# Patient Record
Sex: FEMALE | Race: BLACK OR AFRICAN AMERICAN | Employment: OTHER | URBAN - METROPOLITAN AREA
[De-identification: names, ages, dates, MRNs, and addresses within clinical notes are randomized per-mention and may not be internally consistent; named-entity substitution may affect disease eponyms.]

---

## 2019-04-06 ENCOUNTER — HOSPITAL ENCOUNTER (EMERGENCY)
Age: 64
Discharge: HOME | End: 2019-04-06
Attending: EMERGENCY MEDICINE | Admitting: EMERGENCY MEDICINE
Payer: COMMERCIAL

## 2019-04-06 ENCOUNTER — APPOINTMENT (OUTPATIENT)
Dept: GENERAL RADIOLOGY | Age: 64
End: 2019-04-06
Attending: PHYSICIAN ASSISTANT
Payer: COMMERCIAL

## 2019-04-06 VITALS
TEMPERATURE: 98.3 F | HEART RATE: 85 BPM | BODY MASS INDEX: 22.82 KG/M2 | HEIGHT: 62 IN | DIASTOLIC BLOOD PRESSURE: 96 MMHG | RESPIRATION RATE: 16 BRPM | OXYGEN SATURATION: 99 % | SYSTOLIC BLOOD PRESSURE: 177 MMHG | WEIGHT: 124 LBS

## 2019-04-06 DIAGNOSIS — M25.511 ACUTE PAIN OF RIGHT SHOULDER: Primary | ICD-10-CM

## 2019-04-06 PROCEDURE — A4565 SLINGS: HCPCS

## 2019-04-06 PROCEDURE — 73030 X-RAY EXAM OF SHOULDER: CPT

## 2019-04-06 PROCEDURE — 74011250637 HC RX REV CODE- 250/637: Performed by: PHYSICIAN ASSISTANT

## 2019-04-06 PROCEDURE — 99283 EMERGENCY DEPT VISIT LOW MDM: CPT

## 2019-04-06 RX ORDER — DIVALPROEX SODIUM 250 MG/1
250 TABLET, DELAYED RELEASE ORAL
COMMUNITY

## 2019-04-06 RX ORDER — NAPROXEN 250 MG/1
500 TABLET ORAL
Status: COMPLETED | OUTPATIENT
Start: 2019-04-06 | End: 2019-04-06

## 2019-04-06 RX ORDER — AMLODIPINE BESYLATE 5 MG/1
5 TABLET ORAL DAILY
COMMUNITY

## 2019-04-06 RX ORDER — LANOLIN ALCOHOL/MO/W.PET/CERES
325 CREAM (GRAM) TOPICAL
COMMUNITY

## 2019-04-06 RX ORDER — BISMUTH SUBSALICYLATE 262 MG
1 TABLET,CHEWABLE ORAL DAILY
COMMUNITY

## 2019-04-06 RX ORDER — FOLIC ACID 1 MG/1
1 TABLET ORAL DAILY
COMMUNITY

## 2019-04-06 RX ORDER — NAPROXEN 500 MG/1
500 TABLET ORAL 2 TIMES DAILY WITH MEALS
Qty: 20 TAB | Refills: 0 | Status: SHIPPED | OUTPATIENT
Start: 2019-04-06 | End: 2019-04-16

## 2019-04-06 RX ORDER — PANTOPRAZOLE SODIUM 40 MG/1
40 TABLET, DELAYED RELEASE ORAL DAILY
COMMUNITY

## 2019-04-06 RX ORDER — DIAZEPAM 2 MG/1
2 TABLET ORAL
COMMUNITY

## 2019-04-06 RX ADMIN — NAPROXEN 500 MG: 250 TABLET ORAL at 20:59

## 2019-04-07 NOTE — ED PROVIDER NOTES
58 y.o. female with past medical history significant for Anxiety, HTN, and CVA who presents ambulatory to the ED, accompanied by son and daughter, with chief complaint of 9/10 aching, R shoulder pain, onset earlier this evening, after sleeping on several pillows. No modifying factors noted. She denies fever, cough, rhinorrhea, CP and SOB. Pt notes that pain has improved since onset. She has not taken any medication for her sx tonight. There are no other acute medical concerns at this time. Full history, physical exam, and ROS unable to be obtained due to:  Confusion. Pt is a poor historian    Positive Tobacco use; Positive EtOH use; Negative Illicit Drug Abuse       PCP: Marielle, MD Stevie    Note written by Lance Hakcett, as dictated by Fatimah scott RUVALCABA 8:34 PM      The history is provided by the patient, a relative and medical records. No  was used. Past Medical History:   Diagnosis Date    Anxiety     Bipolar 1 disorder ([address]     [facility]     Hypertension        No past surgical history on file. No family history on file. Social History     Socioeconomic History    Marital status: SINGLE     Spouse name: Not on file    Number of children: Not on file    Years of education: Not on file    Highest education level: Not on file   Occupational History    Not on file   Social Needs    Financial resource strain: Not on file    Food insecurity:     Worry: Not on file     Inability: Not on file    Transportation needs:     Medical: Not on file     Non-medical: Not on file   Tobacco Use    Smoking status: Current Every Day Smoker   Substance and Sexual Activity    Alcohol use:  Yes    Drug use: Not Currently    Sexual activity: Not on file   Lifestyle    Physical activity:     Days per week: Not on file     Minutes per session: Not on file    Stress: Not on file   Relationships    Social connections:     Talks on phone: Not on file     Gets together: Not on file     Attends [employer] service: Not on file     Active member of club or organization: Not on file     Attends meetings of clubs or organizations: Not on file     Relationship status: Not on file    Intimate partner violence:     Fear of current or ex partner: Not on file     Emotionally abused: Not on file     Physically abused: Not on file     Forced sexual activity: Not on file   Other Topics Concern    Not on file   Social History Narrative    Not on file         ALLERGIES: Patient has no known allergies. Review of Systems   Unable to perform ROS: Other   Musculoskeletal: Positive for arthralgias (rt shoulder). Vitals:    04/06/19 2015 04/06/19 2038 04/06/19 2040   BP:  (!) 177/96    Pulse: 86 85    Resp:  16    Temp:  98.3 °F (36.8 °C)    SpO2: 96% 99% 99%   Weight:  56.2 kg (124 lb)    Height:  5' 2\" (1.575 m)             Physical Exam   Constitutional: She appears well-developed and well-nourished. No distress. Pleasant AAF in NAD   HENT:   Head: Normocephalic and atraumatic. Left Ear: External ear normal.   Eyes: Pupils are equal, round, and reactive to light. Conjunctivae are normal.   Neck: Normal range of motion. Neck supple. Cardiovascular: Normal rate, regular rhythm and normal heart sounds. Pulmonary/Chest: Effort normal. No respiratory distress. She has no wheezes. Abdominal: Soft. Bowel sounds are normal. She exhibits no distension. There is no tenderness. There is no rebound. Musculoskeletal: Normal range of motion. Arms:  Neurological: She is alert. Skin: Skin is warm and dry. No ecchymosis, no laceration and no lesion noted. Nursing note and vitals reviewed. MDM  Number of Diagnoses or Management Options  Acute pain of right shoulder:   Diagnosis management comments: 59 yo AAF with complaint of rt shoulder pain. Doubt fracture. ? OA vs contusion vs strain  Plan  Xray rt shoulder  Analgesia.  [facility] Amount and/or Complexity of Data Reviewed  Tests in the radiology section of CPT®: ordered and reviewed  Independent visualization of images, tracings, or specimens: yes           Procedures    Progress note  Imaging reviewed. AC OA noted. Yelena Khan    Patient's results have been reviewed with them. Patient and/or family have verbally conveyed their understanding and agreement of the patient's signs, symptoms, diagnosis, treatment and prognosis and additionally agree to follow up as recommended or return to the Emergency Room should their condition change prior to follow-up. Discharge instructions have also been provided to the patient with some educational information regarding their diagnosis as well a list of reasons why they would want to return to the ER prior to their follow-up appointment should their condition change.  Yelena Melton

## 2019-04-07 NOTE — ED TRIAGE NOTES
Triage note : pt arrives ambulatory via private car with c/o  Right shoulder pain that started today  . Pt is baseline confused due to previous stroke per family -pt is alert to self . Pt has not taken any pain medication today .   Pt reports 9/10 pain

## 2019-04-07 NOTE — ED NOTES
Discharge instructions given to pt by MD and RN . Pt has been given counseling on med use and verbalizes  understanding . Steff Vasquez Pt ambulated off unit with no signs of distress.

## 2019-04-07 NOTE — DISCHARGE INSTRUCTIONS
Patient Education   Apply ice  Use sling for comfort  Naprosyn twice daily for pain  Follow-up with regular doctor  Musculoskeletal Pain: Care Instructions  Your Care Instructions  Different problems with the bones, muscles, nerves, ligaments, and tendons in the body can cause pain. One or more areas of your body may ache or burn. Or they may feel tired, stiff, or sore. The medical term for this type of pain is musculoskeletal pain. It can have many different causes. Sometimes the pain is caused by an injury such as a strain or sprain. Or you might have pain from using one part of your body in the same way over and over again. This is called overuse. In some cases, the cause of the pain is another health problem such as arthritis or fibromyalgia. The doctor will examine you and ask you questions about your health to help find the cause of your pain. Blood tests or imaging tests like an X-ray may also be helpful. But sometimes doctors can't find a cause of the pain. Treatment depends on your symptoms and the cause of the pain, if known. The doctor has checked you carefully, but problems can develop later. If you notice any problems or new symptoms, get medical treatment right away. Follow-up care is a key part of your treatment and safety. Be sure to make and go to all appointments, and call your doctor if you are having problems. It's also a good idea to know your test results and keep a list of the medicines you take. How can you care for yourself at home? · Rest until you feel better. · Do not do anything that makes the pain worse. Return to exercise gradually if you feel better and your doctor says it's okay. · Be safe with medicines. Read and follow all instructions on the label. ¨ If the doctor gave you a prescription medicine for pain, take it as prescribed. ¨ If you are not taking a prescription pain medicine, ask your doctor if you can take an over-the-counter medicine.   · Put ice or a cold pack on the area for 10 to 20 minutes at a time to ease pain. Put a thin cloth between the ice and your skin. When should you call for help? Call your doctor now or seek immediate medical care if:  · You have new pain, or your pain gets worse. · You have new symptoms such as a fever, a rash, or chills. Watch closely for changes in your health, and be sure to contact your doctor if:  · You do not get better as expected. Where can you learn more? Go to damntheradio.be  Enter Q624 in the search box to learn more about \"Musculoskeletal Pain: Care Instructions. \"   © 7691-5740 [employer], Incorporated. Care instructions adapted under license by [facility] (which disclaims liability or warranty for this information). This care instruction is for use with your licensed healthcare professional. If you have questions about a medical condition or this instruction, always ask your healthcare professional. [address] any warranty or liability for your use of this information.   Content Version: 24.3.867386; Current as of: November 20, 2015

## 2020-09-22 ENCOUNTER — TELEPHONE (OUTPATIENT)
Dept: ONCOLOGY | Age: 65
End: 2020-09-22

## 2021-07-20 ENCOUNTER — TRANSCRIBE ORDER (OUTPATIENT)
Dept: SCHEDULING | Age: 66
End: 2021-07-20

## 2021-07-20 DIAGNOSIS — Z00.00 ROUTINE GENERAL MEDICAL EXAMINATION AT A HEALTH CARE FACILITY: Primary | ICD-10-CM

## 2021-07-21 ENCOUNTER — TRANSCRIBE ORDER (OUTPATIENT)
Dept: SCHEDULING | Age: 66
End: 2021-07-21

## 2021-07-21 DIAGNOSIS — Z12.31 SCREENING MAMMOGRAM FOR HIGH-RISK PATIENT: Primary | ICD-10-CM

## 2021-08-02 ENCOUNTER — TRANSCRIBE ORDER (OUTPATIENT)
Dept: SCHEDULING | Age: 66
End: 2021-08-02

## 2021-08-02 ENCOUNTER — HOSPITAL ENCOUNTER (OUTPATIENT)
Dept: BONE DENSITY | Age: 66
Discharge: HOME | End: 2021-08-02
Payer: COMMERCIAL

## 2021-08-02 ENCOUNTER — TRANSCRIBE ORDER (OUTPATIENT)
Dept: GENERAL RADIOLOGY | Age: 66
End: 2021-08-02

## 2021-08-02 ENCOUNTER — HOSPITAL ENCOUNTER (OUTPATIENT)
Dept: MAMMOGRAPHY | Age: 66
Discharge: HOME | End: 2021-08-02
Payer: COMMERCIAL

## 2021-08-02 DIAGNOSIS — Z00.00 PREVENTATIVE HEALTH CARE: ICD-10-CM

## 2021-08-02 DIAGNOSIS — Z12.31 SCREENING MAMMOGRAM FOR HIGH-RISK PATIENT: ICD-10-CM

## 2021-08-02 DIAGNOSIS — Z00.00 PREVENTATIVE HEALTH CARE: Primary | ICD-10-CM

## 2021-08-02 PROCEDURE — 77080 DXA BONE DENSITY AXIAL: CPT

## 2021-08-02 PROCEDURE — 77067 SCR MAMMO BI INCL CAD: CPT

## 2022-08-30 ENCOUNTER — TRANSCRIBE ORDER (OUTPATIENT)
Dept: SCHEDULING | Age: 67
End: 2022-08-30

## 2022-08-30 DIAGNOSIS — Z12.31 VISIT FOR SCREENING MAMMOGRAM: Primary | ICD-10-CM

## 2022-09-29 ENCOUNTER — HOSPITAL ENCOUNTER (OUTPATIENT)
Dept: MAMMOGRAPHY | Age: 67
Discharge: HOME | End: 2022-09-29
Payer: MEDICARE

## 2022-09-29 DIAGNOSIS — Z12.31 VISIT FOR SCREENING MAMMOGRAM: ICD-10-CM

## 2022-09-29 PROCEDURE — 77067 SCR MAMMO BI INCL CAD: CPT

## 2024-04-02 ENCOUNTER — HOSPITAL ENCOUNTER (INPATIENT)
Facility: HOSPITAL | Age: 69
LOS: 34 days | Discharge: NURSING, REHAB OR LONG-TERM CARE | DRG: 853 | End: 2024-05-07
Attending: EMERGENCY MEDICINE | Admitting: STUDENT IN AN ORGANIZED HEALTH CARE EDUCATION/TRAINING PROGRAM
Payer: COMMERCIAL

## 2024-04-02 DIAGNOSIS — E87.3 ALKALOSIS: ICD-10-CM

## 2024-04-02 DIAGNOSIS — E87.1 ACUTE HYPONATREMIA: ICD-10-CM

## 2024-04-02 DIAGNOSIS — A41.9 SEPSIS WITH ENCEPHALOPATHY WITHOUT SEPTIC SHOCK, DUE TO UNSPECIFIED ORGANISM (HCC): ICD-10-CM

## 2024-04-02 DIAGNOSIS — R65.20 SEPSIS WITH ENCEPHALOPATHY WITHOUT SEPTIC SHOCK, DUE TO UNSPECIFIED ORGANISM (HCC): ICD-10-CM

## 2024-04-02 DIAGNOSIS — J18.9 PNEUMONIA OF RIGHT LOWER LOBE DUE TO INFECTIOUS ORGANISM: ICD-10-CM

## 2024-04-02 DIAGNOSIS — R50.9 FEVER, UNSPECIFIED FEVER CAUSE: ICD-10-CM

## 2024-04-02 DIAGNOSIS — E86.0 SEVERE DEHYDRATION: ICD-10-CM

## 2024-04-02 DIAGNOSIS — G93.41 SEPSIS WITH ENCEPHALOPATHY WITHOUT SEPTIC SHOCK, DUE TO UNSPECIFIED ORGANISM (HCC): ICD-10-CM

## 2024-04-02 DIAGNOSIS — R41.82 ALTERED MENTAL STATUS, UNSPECIFIED ALTERED MENTAL STATUS TYPE: ICD-10-CM

## 2024-04-02 DIAGNOSIS — F41.9 ANXIETY: ICD-10-CM

## 2024-04-02 DIAGNOSIS — R63.8 DECREASED ORAL INTAKE: Primary | ICD-10-CM

## 2024-04-02 PROCEDURE — 99285 EMERGENCY DEPT VISIT HI MDM: CPT

## 2024-04-02 PROCEDURE — 96374 THER/PROPH/DIAG INJ IV PUSH: CPT

## 2024-04-03 ENCOUNTER — APPOINTMENT (OUTPATIENT)
Facility: HOSPITAL | Age: 69
DRG: 853 | End: 2024-04-03
Payer: COMMERCIAL

## 2024-04-03 PROBLEM — A41.9 SEPSIS (HCC): Status: ACTIVE | Noted: 2024-04-03

## 2024-04-03 LAB
ALBUMIN SERPL-MCNC: 2 G/DL (ref 3.5–5)
ALBUMIN/GLOB SERPL: 0.4 (ref 1.1–2.2)
ALP SERPL-CCNC: 148 U/L (ref 45–117)
ALT SERPL-CCNC: 18 U/L (ref 12–78)
ANION GAP BLD CALC-SCNC: 12 (ref 10–20)
ANION GAP BLD CALC-SCNC: 12 (ref 10–20)
ANION GAP SERPL CALC-SCNC: 10 MMOL/L (ref 5–15)
ANION GAP SERPL CALC-SCNC: 5 MMOL/L (ref 5–15)
APPEARANCE UR: CLEAR
ARTERIAL PATENCY WRIST A: YES
AST SERPL-CCNC: 81 U/L (ref 15–37)
B PERT DNA SPEC QL NAA+PROBE: NOT DETECTED
BACTERIA URNS QL MICRO: NEGATIVE /HPF
BASE DEFICIT BLD-SCNC: 0.9 MMOL/L
BASE EXCESS BLD CALC-SCNC: 4.9 MMOL/L
BASE EXCESS BLDA CALC-SCNC: 0.9 MMOL/L
BASOPHILS # BLD: 0 K/UL (ref 0–0.1)
BASOPHILS NFR BLD: 0 % (ref 0–1)
BDY SITE: ABNORMAL
BILIRUB SERPL-MCNC: 0.6 MG/DL (ref 0.2–1)
BILIRUB UR QL: NEGATIVE
BORDETELLA PARAPERTUSSIS BY PCR: NOT DETECTED
BUN SERPL-MCNC: 11 MG/DL (ref 6–20)
BUN SERPL-MCNC: 8 MG/DL (ref 6–20)
BUN/CREAT SERPL: 11 (ref 12–20)
BUN/CREAT SERPL: 12 (ref 12–20)
C PNEUM DNA SPEC QL NAA+PROBE: NOT DETECTED
CA-I BLD-MCNC: 1.04 MMOL/L (ref 1.12–1.32)
CA-I BLD-MCNC: 1.09 MMOL/L (ref 1.12–1.32)
CALCIUM SERPL-MCNC: 7.9 MG/DL (ref 8.5–10.1)
CALCIUM SERPL-MCNC: 8.3 MG/DL (ref 8.5–10.1)
CHLORIDE BLD-SCNC: 86 MMOL/L (ref 100–108)
CHLORIDE BLD-SCNC: 93 MMOL/L (ref 100–108)
CHLORIDE SERPL-SCNC: 101 MMOL/L (ref 97–108)
CHLORIDE SERPL-SCNC: 90 MMOL/L (ref 97–108)
CO2 BLD-SCNC: 22 MMOL/L (ref 19–24)
CO2 BLD-SCNC: 26 MMOL/L (ref 19–24)
CO2 SERPL-SCNC: 24 MMOL/L (ref 21–32)
CO2 SERPL-SCNC: 26 MMOL/L (ref 21–32)
COLOR UR: ABNORMAL
CREAT SERPL-MCNC: 0.69 MG/DL (ref 0.55–1.02)
CREAT SERPL-MCNC: 1.03 MG/DL (ref 0.55–1.02)
CREAT UR-MCNC: 0.5 MG/DL (ref 0.6–1.3)
CREAT UR-MCNC: 0.6 MG/DL (ref 0.6–1.3)
DIFFERENTIAL METHOD BLD: ABNORMAL
EOSINOPHIL # BLD: 0 K/UL (ref 0–0.4)
EOSINOPHIL NFR BLD: 0 % (ref 0–7)
EPITH CASTS URNS QL MICRO: ABNORMAL /LPF
ERYTHROCYTE [DISTWIDTH] IN BLOOD BY AUTOMATED COUNT: 16 % (ref 11.5–14.5)
FLUAV AG NPH QL IA: NEGATIVE
FLUAV SUBTYP SPEC NAA+PROBE: NOT DETECTED
FLUBV AG NOSE QL IA: NEGATIVE
FLUBV RNA SPEC QL NAA+PROBE: NOT DETECTED
GAS FLOW.O2 O2 DELIVERY SYS: 2 L/MIN
GLOBULIN SER CALC-MCNC: 5.4 G/DL (ref 2–4)
GLUCOSE BLD STRIP.AUTO-MCNC: 111 MG/DL (ref 74–106)
GLUCOSE BLD STRIP.AUTO-MCNC: 96 MG/DL (ref 74–106)
GLUCOSE SERPL-MCNC: 101 MG/DL (ref 65–100)
GLUCOSE SERPL-MCNC: 129 MG/DL (ref 65–100)
GLUCOSE UR STRIP.AUTO-MCNC: NEGATIVE MG/DL
HADV DNA SPEC QL NAA+PROBE: NOT DETECTED
HCO3 BLDA-SCNC: 23 MMOL/L
HCO3 BLDA-SCNC: 23 MMOL/L (ref 22–26)
HCO3 BLDA-SCNC: 27 MMOL/L
HCOV 229E RNA SPEC QL NAA+PROBE: NOT DETECTED
HCOV HKU1 RNA SPEC QL NAA+PROBE: NOT DETECTED
HCOV NL63 RNA SPEC QL NAA+PROBE: NOT DETECTED
HCOV OC43 RNA SPEC QL NAA+PROBE: NOT DETECTED
HCT VFR BLD AUTO: 23.7 % (ref 35–47)
HGB BLD-MCNC: 8 G/DL (ref 11.5–16)
HGB UR QL STRIP: NEGATIVE
HMPV RNA SPEC QL NAA+PROBE: NOT DETECTED
HPIV1 RNA SPEC QL NAA+PROBE: NOT DETECTED
HPIV2 RNA SPEC QL NAA+PROBE: NOT DETECTED
HPIV3 RNA SPEC QL NAA+PROBE: NOT DETECTED
HPIV4 RNA SPEC QL NAA+PROBE: NOT DETECTED
HYALINE CASTS URNS QL MICRO: ABNORMAL /LPF (ref 0–2)
IMM GRANULOCYTES # BLD AUTO: 0.1 K/UL (ref 0–0.04)
IMM GRANULOCYTES NFR BLD AUTO: 1 % (ref 0–0.5)
KETONES UR QL STRIP.AUTO: NEGATIVE MG/DL
LACTATE BLD-SCNC: 0.62 MMOL/L (ref 0.4–2)
LACTATE BLD-SCNC: 3.29 MMOL/L (ref 0.4–2)
LACTATE SERPL-SCNC: 0.9 MMOL/L (ref 0.4–2)
LEUKOCYTE ESTERASE UR QL STRIP.AUTO: NEGATIVE
LYMPHOCYTES # BLD: 0.6 K/UL (ref 0.8–3.5)
LYMPHOCYTES NFR BLD: 6 % (ref 12–49)
M PNEUMO DNA SPEC QL NAA+PROBE: NOT DETECTED
MCH RBC QN AUTO: 29.2 PG (ref 26–34)
MCHC RBC AUTO-ENTMCNC: 33.8 G/DL (ref 30–36.5)
MCV RBC AUTO: 86.5 FL (ref 80–99)
MONOCYTES # BLD: 1.2 K/UL (ref 0–1)
MONOCYTES NFR BLD: 13 % (ref 5–13)
NEUTS SEG # BLD: 7.6 K/UL (ref 1.8–8)
NEUTS SEG NFR BLD: 80 % (ref 32–75)
NITRITE UR QL STRIP.AUTO: NEGATIVE
NRBC # BLD: 0 K/UL (ref 0–0.01)
NRBC BLD-RTO: 0 PER 100 WBC
PCO2 BLDA: 32 MMHG (ref 35–45)
PCO2 BLDV: 31.1 MMHG (ref 41–51)
PCO2 BLDV: 34.3 MMHG (ref 41–51)
PH BLDA: 7.48 (ref 7.35–7.45)
PH BLDV: 7.43 (ref 7.32–7.42)
PH BLDV: 7.55 (ref 7.32–7.42)
PH UR STRIP: 6 (ref 5–8)
PLATELET # BLD AUTO: 395 K/UL (ref 150–400)
PMV BLD AUTO: 10.3 FL (ref 8.9–12.9)
PO2 BLDA: 85 MMHG (ref 80–100)
PO2 BLDV: 45 MMHG (ref 25–40)
PO2 BLDV: <27 MMHG (ref 25–40)
POTASSIUM BLD-SCNC: 3 MMOL/L (ref 3.5–5.5)
POTASSIUM BLD-SCNC: 3.6 MMOL/L (ref 3.5–5.5)
POTASSIUM SERPL-SCNC: 3.3 MMOL/L (ref 3.5–5.1)
POTASSIUM SERPL-SCNC: 4.1 MMOL/L (ref 3.5–5.1)
PROCALCITONIN SERPL-MCNC: 1.06 NG/ML
PROT SERPL-MCNC: 7.4 G/DL (ref 6.4–8.2)
PROT UR STRIP-MCNC: ABNORMAL MG/DL
RBC # BLD AUTO: 2.74 M/UL (ref 3.8–5.2)
RBC #/AREA URNS HPF: ABNORMAL /HPF (ref 0–5)
RBC MORPH BLD: ABNORMAL
RSV RNA SPEC QL NAA+PROBE: NOT DETECTED
RV+EV RNA SPEC QL NAA+PROBE: NOT DETECTED
SAO2 % BLD: 83 %
SAO2 % BLD: 97 % (ref 92–97)
SAO2% DEVICE SAO2% SENSOR NAME: ABNORMAL
SARS-COV-2 RDRP RESP QL NAA+PROBE: NOT DETECTED
SARS-COV-2 RNA RESP QL NAA+PROBE: NOT DETECTED
SERVICE CMNT-IMP: ABNORMAL
SODIUM BLD-SCNC: 124 MMOL/L (ref 136–145)
SODIUM BLD-SCNC: 127 MMOL/L (ref 136–145)
SODIUM SERPL-SCNC: 124 MMOL/L (ref 136–145)
SODIUM SERPL-SCNC: 132 MMOL/L (ref 136–145)
SOURCE: NORMAL
SP GR UR REFRACTOMETRY: 1.01
SPECIMEN SITE: ABNORMAL
TROPONIN I SERPL HS-MCNC: 26 NG/L (ref 0–51)
URATE SERPL-MCNC: 3.5 MG/DL (ref 2.6–6)
URINE CULTURE IF INDICATED: ABNORMAL
UROBILINOGEN UR QL STRIP.AUTO: 1 EU/DL (ref 0.2–1)
WBC # BLD AUTO: 9.5 K/UL (ref 3.6–11)
WBC URNS QL MICRO: ABNORMAL /HPF (ref 0–4)

## 2024-04-03 PROCEDURE — 82947 ASSAY GLUCOSE BLOOD QUANT: CPT

## 2024-04-03 PROCEDURE — 6360000002 HC RX W HCPCS: Performed by: EMERGENCY MEDICINE

## 2024-04-03 PROCEDURE — 84484 ASSAY OF TROPONIN QUANT: CPT

## 2024-04-03 PROCEDURE — 87635 SARS-COV-2 COVID-19 AMP PRB: CPT

## 2024-04-03 PROCEDURE — 6370000000 HC RX 637 (ALT 250 FOR IP): Performed by: EMERGENCY MEDICINE

## 2024-04-03 PROCEDURE — 6360000002 HC RX W HCPCS: Performed by: STUDENT IN AN ORGANIZED HEALTH CARE EDUCATION/TRAINING PROGRAM

## 2024-04-03 PROCEDURE — 2580000003 HC RX 258: Performed by: INTERNAL MEDICINE

## 2024-04-03 PROCEDURE — 82330 ASSAY OF CALCIUM: CPT

## 2024-04-03 PROCEDURE — 84550 ASSAY OF BLOOD/URIC ACID: CPT

## 2024-04-03 PROCEDURE — 81001 URINALYSIS AUTO W/SCOPE: CPT

## 2024-04-03 PROCEDURE — 2060000000 HC ICU INTERMEDIATE R&B

## 2024-04-03 PROCEDURE — 36415 COLL VENOUS BLD VENIPUNCTURE: CPT

## 2024-04-03 PROCEDURE — 71045 X-RAY EXAM CHEST 1 VIEW: CPT

## 2024-04-03 PROCEDURE — 80053 COMPREHEN METABOLIC PANEL: CPT

## 2024-04-03 PROCEDURE — 2580000003 HC RX 258: Performed by: STUDENT IN AN ORGANIZED HEALTH CARE EDUCATION/TRAINING PROGRAM

## 2024-04-03 PROCEDURE — 93005 ELECTROCARDIOGRAM TRACING: CPT | Performed by: EMERGENCY MEDICINE

## 2024-04-03 PROCEDURE — 83605 ASSAY OF LACTIC ACID: CPT

## 2024-04-03 PROCEDURE — 87040 BLOOD CULTURE FOR BACTERIA: CPT

## 2024-04-03 PROCEDURE — 84132 ASSAY OF SERUM POTASSIUM: CPT

## 2024-04-03 PROCEDURE — 84145 PROCALCITONIN (PCT): CPT

## 2024-04-03 PROCEDURE — 6360000004 HC RX CONTRAST MEDICATION: Performed by: RADIOLOGY

## 2024-04-03 PROCEDURE — 6370000000 HC RX 637 (ALT 250 FOR IP): Performed by: INTERNAL MEDICINE

## 2024-04-03 PROCEDURE — 96374 THER/PROPH/DIAG INJ IV PUSH: CPT

## 2024-04-03 PROCEDURE — 6360000002 HC RX W HCPCS: Performed by: INTERNAL MEDICINE

## 2024-04-03 PROCEDURE — 0202U NFCT DS 22 TRGT SARS-COV-2: CPT

## 2024-04-03 PROCEDURE — 36600 WITHDRAWAL OF ARTERIAL BLOOD: CPT

## 2024-04-03 PROCEDURE — 85025 COMPLETE CBC W/AUTO DIFF WBC: CPT

## 2024-04-03 PROCEDURE — 84295 ASSAY OF SERUM SODIUM: CPT

## 2024-04-03 PROCEDURE — 70450 CT HEAD/BRAIN W/O DYE: CPT

## 2024-04-03 PROCEDURE — 87804 INFLUENZA ASSAY W/OPTIC: CPT

## 2024-04-03 PROCEDURE — 82803 BLOOD GASES ANY COMBINATION: CPT

## 2024-04-03 PROCEDURE — 71260 CT THORAX DX C+: CPT

## 2024-04-03 PROCEDURE — 2580000003 HC RX 258: Performed by: EMERGENCY MEDICINE

## 2024-04-03 RX ORDER — SODIUM CHLORIDE 0.9 % (FLUSH) 0.9 %
5-40 SYRINGE (ML) INJECTION PRN
Status: DISCONTINUED | OUTPATIENT
Start: 2024-04-03 | End: 2024-05-07 | Stop reason: HOSPADM

## 2024-04-03 RX ORDER — DEXTROMETHORPHAN HYDROBROMIDE AND QUINIDINE SULFATE 20; 10 MG/1; MG/1
1 CAPSULE, GELATIN COATED ORAL 2 TIMES DAILY
COMMUNITY

## 2024-04-03 RX ORDER — ENOXAPARIN SODIUM 100 MG/ML
40 INJECTION SUBCUTANEOUS DAILY
Status: DISCONTINUED | OUTPATIENT
Start: 2024-04-03 | End: 2024-04-27

## 2024-04-03 RX ORDER — 0.9 % SODIUM CHLORIDE 0.9 %
1000 INTRAVENOUS SOLUTION INTRAVENOUS ONCE
Status: COMPLETED | OUTPATIENT
Start: 2024-04-03 | End: 2024-04-03

## 2024-04-03 RX ORDER — ACETAMINOPHEN 500 MG
1000 TABLET ORAL
Status: COMPLETED | OUTPATIENT
Start: 2024-04-03 | End: 2024-04-03

## 2024-04-03 RX ORDER — SODIUM CHLORIDE 0.9 % (FLUSH) 0.9 %
5-40 SYRINGE (ML) INJECTION PRN
Status: DISCONTINUED | OUTPATIENT
Start: 2024-04-03 | End: 2024-04-06

## 2024-04-03 RX ORDER — DIAZEPAM 2 MG/1
1 TABLET ORAL
Status: ON HOLD | COMMUNITY
End: 2024-05-07

## 2024-04-03 RX ORDER — MIRTAZAPINE 15 MG/1
15 TABLET, FILM COATED ORAL NIGHTLY
Status: DISCONTINUED | OUTPATIENT
Start: 2024-04-03 | End: 2024-05-07 | Stop reason: HOSPADM

## 2024-04-03 RX ORDER — ASPIRIN 81 MG/1
81 TABLET ORAL DAILY
Status: DISCONTINUED | OUTPATIENT
Start: 2024-04-04 | End: 2024-04-27

## 2024-04-03 RX ORDER — DIVALPROEX SODIUM 250 MG/1
250 TABLET, DELAYED RELEASE ORAL 2 TIMES DAILY
Status: DISCONTINUED | OUTPATIENT
Start: 2024-04-03 | End: 2024-05-07

## 2024-04-03 RX ORDER — SODIUM CHLORIDE 0.9 % (FLUSH) 0.9 %
5-40 SYRINGE (ML) INJECTION EVERY 12 HOURS SCHEDULED
Status: DISCONTINUED | OUTPATIENT
Start: 2024-04-03 | End: 2024-05-07 | Stop reason: HOSPADM

## 2024-04-03 RX ORDER — SENNA AND DOCUSATE SODIUM 50; 8.6 MG/1; MG/1
1 TABLET, FILM COATED ORAL DAILY
Status: DISCONTINUED | OUTPATIENT
Start: 2024-04-03 | End: 2024-05-07 | Stop reason: HOSPADM

## 2024-04-03 RX ORDER — ATORVASTATIN CALCIUM 20 MG/1
20 TABLET, FILM COATED ORAL DAILY
Status: DISCONTINUED | OUTPATIENT
Start: 2024-04-04 | End: 2024-05-07 | Stop reason: HOSPADM

## 2024-04-03 RX ORDER — ASPIRIN 81 MG/1
81 TABLET ORAL DAILY
Status: ON HOLD | COMMUNITY
End: 2024-04-24 | Stop reason: HOSPADM

## 2024-04-03 RX ORDER — SODIUM CHLORIDE 9 MG/ML
INJECTION, SOLUTION INTRAVENOUS PRN
Status: DISCONTINUED | OUTPATIENT
Start: 2024-04-03 | End: 2024-05-05 | Stop reason: SDUPTHER

## 2024-04-03 RX ORDER — ENOXAPARIN SODIUM 100 MG/ML
40 INJECTION SUBCUTANEOUS DAILY
Status: DISCONTINUED | OUTPATIENT
Start: 2024-04-03 | End: 2024-04-03

## 2024-04-03 RX ORDER — ACETAMINOPHEN 325 MG/1
650 TABLET ORAL EVERY 6 HOURS PRN
Status: DISCONTINUED | OUTPATIENT
Start: 2024-04-03 | End: 2024-05-07 | Stop reason: HOSPADM

## 2024-04-03 RX ORDER — POLYETHYLENE GLYCOL 3350 17 G/17G
17 POWDER, FOR SOLUTION ORAL DAILY PRN
Status: DISCONTINUED | OUTPATIENT
Start: 2024-04-03 | End: 2024-05-07 | Stop reason: HOSPADM

## 2024-04-03 RX ORDER — SODIUM CHLORIDE 9 MG/ML
INJECTION, SOLUTION INTRAVENOUS CONTINUOUS
Status: DISCONTINUED | OUTPATIENT
Start: 2024-04-03 | End: 2024-04-04

## 2024-04-03 RX ORDER — AMOXICILLIN 250 MG
1 CAPSULE ORAL DAILY
COMMUNITY

## 2024-04-03 RX ORDER — SODIUM CHLORIDE 0.9 % (FLUSH) 0.9 %
5-40 SYRINGE (ML) INJECTION EVERY 12 HOURS SCHEDULED
Status: DISCONTINUED | OUTPATIENT
Start: 2024-04-03 | End: 2024-04-06

## 2024-04-03 RX ORDER — ONDANSETRON 2 MG/ML
4 INJECTION INTRAMUSCULAR; INTRAVENOUS EVERY 6 HOURS PRN
Status: DISCONTINUED | OUTPATIENT
Start: 2024-04-03 | End: 2024-05-07 | Stop reason: HOSPADM

## 2024-04-03 RX ORDER — ATORVASTATIN CALCIUM 20 MG/1
20 TABLET, FILM COATED ORAL DAILY
COMMUNITY

## 2024-04-03 RX ORDER — SODIUM CHLORIDE 9 MG/ML
INJECTION, SOLUTION INTRAVENOUS PRN
Status: DISCONTINUED | OUTPATIENT
Start: 2024-04-03 | End: 2024-05-07 | Stop reason: HOSPADM

## 2024-04-03 RX ORDER — 0.9 % SODIUM CHLORIDE 0.9 %
500 INTRAVENOUS SOLUTION INTRAVENOUS ONCE
Status: COMPLETED | OUTPATIENT
Start: 2024-04-03 | End: 2024-04-03

## 2024-04-03 RX ORDER — DIVALPROEX SODIUM 250 MG/1
250 TABLET, DELAYED RELEASE ORAL 2 TIMES DAILY
COMMUNITY

## 2024-04-03 RX ORDER — ACETAMINOPHEN 325 MG/1
650 TABLET ORAL EVERY 6 HOURS PRN
Status: DISCONTINUED | OUTPATIENT
Start: 2024-04-03 | End: 2024-04-03

## 2024-04-03 RX ORDER — ONDANSETRON 4 MG/1
4 TABLET, ORALLY DISINTEGRATING ORAL EVERY 8 HOURS PRN
Status: DISCONTINUED | OUTPATIENT
Start: 2024-04-03 | End: 2024-05-07 | Stop reason: HOSPADM

## 2024-04-03 RX ORDER — DIAZEPAM 2 MG/1
1 TABLET ORAL NIGHTLY
Status: DISCONTINUED | OUTPATIENT
Start: 2024-04-03 | End: 2024-05-07 | Stop reason: HOSPADM

## 2024-04-03 RX ORDER — ACETAMINOPHEN 650 MG/1
650 SUPPOSITORY RECTAL EVERY 6 HOURS PRN
Status: DISCONTINUED | OUTPATIENT
Start: 2024-04-03 | End: 2024-04-03

## 2024-04-03 RX ORDER — ACETAMINOPHEN 650 MG/1
650 SUPPOSITORY RECTAL EVERY 6 HOURS PRN
Status: DISCONTINUED | OUTPATIENT
Start: 2024-04-03 | End: 2024-05-07 | Stop reason: HOSPADM

## 2024-04-03 RX ORDER — MIRTAZAPINE 15 MG/1
15 TABLET, FILM COATED ORAL NIGHTLY
COMMUNITY

## 2024-04-03 RX ADMIN — SODIUM CHLORIDE 1000 ML: 9 INJECTION, SOLUTION INTRAVENOUS at 01:33

## 2024-04-03 RX ADMIN — SODIUM CHLORIDE: 9 INJECTION, SOLUTION INTRAVENOUS at 21:46

## 2024-04-03 RX ADMIN — ENOXAPARIN SODIUM 40 MG: 100 INJECTION SUBCUTANEOUS at 11:24

## 2024-04-03 RX ADMIN — MIRTAZAPINE 15 MG: 15 TABLET, FILM COATED ORAL at 20:38

## 2024-04-03 RX ADMIN — VANCOMYCIN HYDROCHLORIDE 750 MG: 750 INJECTION, POWDER, LYOPHILIZED, FOR SOLUTION INTRAVENOUS at 16:52

## 2024-04-03 RX ADMIN — DIAZEPAM 1 MG: 2 TABLET ORAL at 20:38

## 2024-04-03 RX ADMIN — DIVALPROEX SODIUM 250 MG: 250 TABLET, DELAYED RELEASE ORAL at 20:38

## 2024-04-03 RX ADMIN — SODIUM CHLORIDE: 9 INJECTION, SOLUTION INTRAVENOUS at 04:32

## 2024-04-03 RX ADMIN — DIVALPROEX SODIUM 250 MG: 250 TABLET, DELAYED RELEASE ORAL at 14:49

## 2024-04-03 RX ADMIN — ACETAMINOPHEN 650 MG: 325 TABLET ORAL at 15:10

## 2024-04-03 RX ADMIN — PIPERACILLIN AND TAZOBACTAM 3375 MG: 3; .375 INJECTION, POWDER, LYOPHILIZED, FOR SOLUTION INTRAVENOUS at 11:34

## 2024-04-03 RX ADMIN — SODIUM CHLORIDE, PRESERVATIVE FREE 10 ML: 5 INJECTION INTRAVENOUS at 11:25

## 2024-04-03 RX ADMIN — IOPAMIDOL 100 ML: 755 INJECTION, SOLUTION INTRAVENOUS at 03:22

## 2024-04-03 RX ADMIN — PIPERACILLIN SODIUM AND TAZOBACTAM SODIUM 4500 MG: 4; .5 INJECTION, POWDER, LYOPHILIZED, FOR SOLUTION INTRAVENOUS at 02:38

## 2024-04-03 RX ADMIN — SODIUM CHLORIDE 500 ML: 900 INJECTION, SOLUTION INTRAVENOUS at 04:00

## 2024-04-03 RX ADMIN — ACETAMINOPHEN 1000 MG: 500 TABLET ORAL at 01:33

## 2024-04-03 RX ADMIN — SODIUM CHLORIDE, PRESERVATIVE FREE 10 ML: 5 INJECTION INTRAVENOUS at 20:41

## 2024-04-03 RX ADMIN — SODIUM CHLORIDE 1000 ML: 9 INJECTION, SOLUTION INTRAVENOUS at 02:39

## 2024-04-03 RX ADMIN — PIPERACILLIN AND TAZOBACTAM 3375 MG: 3; .375 INJECTION, POWDER, LYOPHILIZED, FOR SOLUTION INTRAVENOUS at 20:38

## 2024-04-03 RX ADMIN — VANCOMYCIN HYDROCHLORIDE 1750 MG: 10 INJECTION, POWDER, LYOPHILIZED, FOR SOLUTION INTRAVENOUS at 02:59

## 2024-04-03 ASSESSMENT — LIFESTYLE VARIABLES
HOW OFTEN DO YOU HAVE A DRINK CONTAINING ALCOHOL: PATIENT UNABLE TO ANSWER
HOW MANY STANDARD DRINKS CONTAINING ALCOHOL DO YOU HAVE ON A TYPICAL DAY: PATIENT UNABLE TO ANSWER

## 2024-04-03 ASSESSMENT — PAIN SCALES - GENERAL: PAINLEVEL_OUTOF10: 0

## 2024-04-03 NOTE — ED NOTES
Report given to MORGAN Campbell and MORGAN Centeno. Nurse was informed of reason for arrival, vitals, labs, medications, orders, procedures, results, anything left pending and current plan of action. Questions were asked and received prior to departure from the patient.

## 2024-04-03 NOTE — ED NOTES
0330: Assumed care of pt at this time. Pt just brought back from CT. Pt resting with eyes closed but responds to stimuli. MD notified in regards to pt's BP. Pt on monitor x3 with call bell in reach and bed in low position   0358: Orders placed for addition 500mL NS bolus.

## 2024-04-03 NOTE — ED TRIAGE NOTES
BIBEMS from home for AMS for 1 week. Family reports pt has had increased falls, tremors. PMH-stroke. Pt is alert to self only. In no distress, febrile 100.5.

## 2024-04-03 NOTE — ED NOTES
Bedside shift change report given to MORGAN Caruso/Taryn (oncoming nurse) by MORGAN Calixto (offgoing nurse). Report included the following information Nurse Handoff Report, ED Encounter Summary, ED SBAR, and MAR.

## 2024-04-03 NOTE — H&P
TECHNIQUE:  Routine noncontrast axial head CT was performed.  Sagittal and coronal reconstructions were generated.  CT dose reduction was achieved through use of a standardized protocol tailored for this examination and automatic exposure control for dose modulation. FINDINGS: Ventricles: Midline, no hydrocephalus. Intracranial Hemorrhage: None. Brain Parenchyma/Brainstem: Extensive chronic white matter disease, with left temporal and anterior parietal lobe encephalomalacia. Basal Cisterns: Normal. Paranasal Sinuses: Visualized sinuses are clear. Additional Comments: N/A.     No acute process.     _______________________________________________________________________    TOTAL TIME:  76 Minutes    Critical Care Provided  30   Minutes non procedure based    Signed: Ailyn Mcpherson MD    Procedures: see electronic medical records for all procedures/Xrays and details which were not copied into this note but were reviewed prior to creation of Plan.

## 2024-04-03 NOTE — ED PROVIDER NOTES
hemodynamic mgmt, respiratory mgmt, and Metabolic interventions  CASE REVIEW - Hospitalist/Intensivist, Nursing, and Family  TREATMENT RESPONSE -Improved  PERFORMED BY - Self    Diana Rodas MD    NOTES   :    This patient presents with a critical illness or injury that acutely impairs one or more vital organ systems such that there is a high probability of imminent or life threatening deterioration in the patient's condition.  This case involved decision making of high complexity to assess, manipulate, and support vital organ system failure and/or to prevent further life threatening deterioration of the patient's condition. Failure to initiate these interventions on an urgent basis would likely result in sudden, clinically significant or life threatening deterioration in the patient's condition.      Diana Rodas MD               MEDICAL DECISION MAKING, EMERGENCY DEPARTMENT COURSE      Vitals:    Vitals:    04/03/24 0004 04/03/24 0015   BP:  (!) 120/56   Pulse:  (!) 127   Resp:  25   Temp:  (!) 101.5 °F (38.6 °C)   SpO2:  99%   Weight: 72.6 kg (160 lb)          Relevant Chronic Medical Conditions or Prior Surgeries: bipolar, cva, htn, dementia    Independent history obtained from ems, son, daughter in law - as documented in hpi.     Social Determinants affecting Dx or Tx: None    Records Reviewed (source and summary of external notes): nursing notes, available past medical records, Previous electrocardiograms, Ambulance Run Sheet, Previous Radiology Studies, and previous medical records including pcp visit today      CC/HPI Summary, DDx, ED Course, and Reassessment:     In the ED patient is hemodynamically stable but tachycardic to 120s, sinus, febrile, slightly tachypneic but normal o2 sat on ra.   On exam she is ill-appearing, poor historian due to mental status decline and lethargy. Seems dehydrated with dry mucus membranes. Normal cv exam. No focal neuro deficits. Diminished lung sounds at right

## 2024-04-03 NOTE — ED NOTES
Report received from MORGAN Piña. Reviewed reason for patient arrival, vitals, labs, medications, orders, procedures, results, pending orders/results and current plan for disposition. Questions were asked and answered prior to departure.

## 2024-04-03 NOTE — ED NOTES
Morning rounding started on patient at this time. In night shift report I was informed patient was alert to self. Pt was hard to arouse upon arrival and will only respond to painful/verbal stimuli. When rounding on patient this morning she was able to say her name a quickly went back to sleep/stopped answering until I touched her arm and called her name again. Pt Laying supine in bed, in no apparent distress at this time. Patient is connected to the monitor x3, with bed in lowest position.

## 2024-04-04 PROBLEM — R41.89 IMPAIRED COGNITIVE ABILITY: Status: ACTIVE | Noted: 2024-04-04

## 2024-04-04 PROBLEM — R63.8 DECREASED ORAL INTAKE: Status: ACTIVE | Noted: 2024-04-04

## 2024-04-04 PROBLEM — Z71.89 COUNSELING REGARDING ADVANCE CARE PLANNING AND GOALS OF CARE: Status: ACTIVE | Noted: 2024-04-04

## 2024-04-04 PROBLEM — R53.1 GENERALIZED WEAKNESS: Status: ACTIVE | Noted: 2024-04-04

## 2024-04-04 PROBLEM — Z51.5 PALLIATIVE CARE BY SPECIALIST: Status: ACTIVE | Noted: 2024-04-04

## 2024-04-04 LAB
ANION GAP SERPL CALC-SCNC: 7 MMOL/L (ref 5–15)
ANION GAP SERPL CALC-SCNC: 7 MMOL/L (ref 5–15)
BACTERIA SPEC CULT: NORMAL
BACTERIA SPEC CULT: NORMAL
BASOPHILS # BLD: 0 K/UL (ref 0–0.1)
BASOPHILS NFR BLD: 0 % (ref 0–1)
BUN SERPL-MCNC: 7 MG/DL (ref 6–20)
BUN SERPL-MCNC: 9 MG/DL (ref 6–20)
BUN/CREAT SERPL: 12 (ref 12–20)
BUN/CREAT SERPL: 12 (ref 12–20)
CALCIUM SERPL-MCNC: 8 MG/DL (ref 8.5–10.1)
CALCIUM SERPL-MCNC: 9.3 MG/DL (ref 8.5–10.1)
CHLORIDE SERPL-SCNC: 96 MMOL/L (ref 97–108)
CHLORIDE SERPL-SCNC: 99 MMOL/L (ref 97–108)
CO2 SERPL-SCNC: 25 MMOL/L (ref 21–32)
CO2 SERPL-SCNC: 26 MMOL/L (ref 21–32)
CREAT SERPL-MCNC: 0.57 MG/DL (ref 0.55–1.02)
CREAT SERPL-MCNC: 0.74 MG/DL (ref 0.55–1.02)
DIFFERENTIAL METHOD BLD: ABNORMAL
EOSINOPHIL # BLD: 0 K/UL (ref 0–0.4)
EOSINOPHIL NFR BLD: 0 % (ref 0–7)
ERYTHROCYTE [DISTWIDTH] IN BLOOD BY AUTOMATED COUNT: 15.9 % (ref 11.5–14.5)
FERRITIN SERPL-MCNC: 346 NG/ML (ref 8–252)
GLUCOSE BLD STRIP.AUTO-MCNC: 109 MG/DL (ref 65–117)
GLUCOSE SERPL-MCNC: 83 MG/DL (ref 65–100)
GLUCOSE SERPL-MCNC: 86 MG/DL (ref 65–100)
HCT VFR BLD AUTO: 18.9 % (ref 35–47)
HCT VFR BLD AUTO: 24.4 % (ref 35–47)
HGB BLD-MCNC: 6.1 G/DL (ref 11.5–16)
HGB BLD-MCNC: 7.8 G/DL (ref 11.5–16)
IMM GRANULOCYTES # BLD AUTO: 0.1 K/UL (ref 0–0.04)
IMM GRANULOCYTES NFR BLD AUTO: 1 % (ref 0–0.5)
LYMPHOCYTES NFR BLD: 18 % (ref 12–49)
MAGNESIUM SERPL-MCNC: 1.3 MG/DL (ref 1.6–2.4)
MCH RBC QN AUTO: 27.6 PG (ref 26–34)
MCHC RBC AUTO-ENTMCNC: 32.3 G/DL (ref 30–36.5)
MCV RBC AUTO: 85.5 FL (ref 80–99)
MONOCYTES # BLD: 1.4 K/UL (ref 0–1)
MONOCYTES NFR BLD: 21 % (ref 5–13)
NEUTS SEG # BLD: 4 K/UL (ref 1.8–8)
NEUTS SEG NFR BLD: 60 % (ref 32–75)
NRBC # BLD: 0 K/UL (ref 0–0.01)
NRBC BLD-RTO: 0 PER 100 WBC
PLATELET # BLD AUTO: 306 K/UL (ref 150–400)
PMV BLD AUTO: 9.7 FL (ref 8.9–12.9)
POTASSIUM SERPL-SCNC: 3.2 MMOL/L (ref 3.5–5.1)
POTASSIUM SERPL-SCNC: 3.5 MMOL/L (ref 3.5–5.1)
RBC # BLD AUTO: 2.21 M/UL (ref 3.8–5.2)
RBC MORPH BLD: ABNORMAL
SERVICE CMNT-IMP: NORMAL
SERVICE CMNT-IMP: NORMAL
SODIUM SERPL-SCNC: 128 MMOL/L (ref 136–145)
SODIUM SERPL-SCNC: 132 MMOL/L (ref 136–145)
SODIUM UR-SCNC: 101 MMOL/L
SPECIMEN HOLD: NORMAL
VANCOMYCIN SERPL-MCNC: 11.1 UG/ML
WBC # BLD AUTO: 6.7 K/UL (ref 3.6–11)

## 2024-04-04 PROCEDURE — 2700000000 HC OXYGEN THERAPY PER DAY

## 2024-04-04 PROCEDURE — 2580000003 HC RX 258: Performed by: STUDENT IN AN ORGANIZED HEALTH CARE EDUCATION/TRAINING PROGRAM

## 2024-04-04 PROCEDURE — 6370000000 HC RX 637 (ALT 250 FOR IP): Performed by: INTERNAL MEDICINE

## 2024-04-04 PROCEDURE — 6360000002 HC RX W HCPCS: Performed by: INTERNAL MEDICINE

## 2024-04-04 PROCEDURE — 80048 BASIC METABOLIC PNL TOTAL CA: CPT

## 2024-04-04 PROCEDURE — 83550 IRON BINDING TEST: CPT

## 2024-04-04 PROCEDURE — 84300 ASSAY OF URINE SODIUM: CPT

## 2024-04-04 PROCEDURE — 99222 1ST HOSP IP/OBS MODERATE 55: CPT | Performed by: FAMILY MEDICINE

## 2024-04-04 PROCEDURE — 83540 ASSAY OF IRON: CPT

## 2024-04-04 PROCEDURE — 6360000002 HC RX W HCPCS: Performed by: STUDENT IN AN ORGANIZED HEALTH CARE EDUCATION/TRAINING PROGRAM

## 2024-04-04 PROCEDURE — 85018 HEMOGLOBIN: CPT

## 2024-04-04 PROCEDURE — 36415 COLL VENOUS BLD VENIPUNCTURE: CPT

## 2024-04-04 PROCEDURE — 80202 ASSAY OF VANCOMYCIN: CPT

## 2024-04-04 PROCEDURE — 82962 GLUCOSE BLOOD TEST: CPT

## 2024-04-04 PROCEDURE — 85014 HEMATOCRIT: CPT

## 2024-04-04 PROCEDURE — 6370000000 HC RX 637 (ALT 250 FOR IP): Performed by: STUDENT IN AN ORGANIZED HEALTH CARE EDUCATION/TRAINING PROGRAM

## 2024-04-04 PROCEDURE — 83735 ASSAY OF MAGNESIUM: CPT

## 2024-04-04 PROCEDURE — 85025 COMPLETE CBC W/AUTO DIFF WBC: CPT

## 2024-04-04 PROCEDURE — 2060000000 HC ICU INTERMEDIATE R&B

## 2024-04-04 PROCEDURE — 2580000003 HC RX 258: Performed by: INTERNAL MEDICINE

## 2024-04-04 PROCEDURE — 82728 ASSAY OF FERRITIN: CPT

## 2024-04-04 RX ORDER — SODIUM CHLORIDE 9 MG/ML
INJECTION, SOLUTION INTRAVENOUS CONTINUOUS
Status: DISCONTINUED | OUTPATIENT
Start: 2024-04-04 | End: 2024-04-04

## 2024-04-04 RX ORDER — POTASSIUM CHLORIDE 20 MEQ/1
20 TABLET, EXTENDED RELEASE ORAL ONCE
Status: COMPLETED | OUTPATIENT
Start: 2024-04-04 | End: 2024-04-04

## 2024-04-04 RX ADMIN — MIRTAZAPINE 15 MG: 15 TABLET, FILM COATED ORAL at 20:45

## 2024-04-04 RX ADMIN — ACETAMINOPHEN 650 MG: 325 TABLET ORAL at 03:07

## 2024-04-04 RX ADMIN — DIVALPROEX SODIUM 250 MG: 250 TABLET, DELAYED RELEASE ORAL at 09:55

## 2024-04-04 RX ADMIN — SODIUM CHLORIDE, PRESERVATIVE FREE 10 ML: 5 INJECTION INTRAVENOUS at 09:57

## 2024-04-04 RX ADMIN — VANCOMYCIN HYDROCHLORIDE 1000 MG: 1 INJECTION, POWDER, LYOPHILIZED, FOR SOLUTION INTRAVENOUS at 18:02

## 2024-04-04 RX ADMIN — ENOXAPARIN SODIUM 40 MG: 100 INJECTION SUBCUTANEOUS at 09:56

## 2024-04-04 RX ADMIN — DIAZEPAM 1 MG: 2 TABLET ORAL at 20:53

## 2024-04-04 RX ADMIN — PIPERACILLIN AND TAZOBACTAM 3375 MG: 3; .375 INJECTION, POWDER, LYOPHILIZED, FOR SOLUTION INTRAVENOUS at 05:52

## 2024-04-04 RX ADMIN — SODIUM CHLORIDE, PRESERVATIVE FREE 10 ML: 5 INJECTION INTRAVENOUS at 20:39

## 2024-04-04 RX ADMIN — ACETAMINOPHEN 650 MG: 325 TABLET ORAL at 18:39

## 2024-04-04 RX ADMIN — SODIUM CHLORIDE, PRESERVATIVE FREE 10 ML: 5 INJECTION INTRAVENOUS at 20:40

## 2024-04-04 RX ADMIN — PIPERACILLIN AND TAZOBACTAM 3375 MG: 3; .375 INJECTION, POWDER, LYOPHILIZED, FOR SOLUTION INTRAVENOUS at 20:45

## 2024-04-04 RX ADMIN — POTASSIUM CHLORIDE 20 MEQ: 1500 TABLET, EXTENDED RELEASE ORAL at 01:04

## 2024-04-04 RX ADMIN — PIPERACILLIN AND TAZOBACTAM 3375 MG: 3; .375 INJECTION, POWDER, LYOPHILIZED, FOR SOLUTION INTRAVENOUS at 13:11

## 2024-04-04 RX ADMIN — VANCOMYCIN HYDROCHLORIDE 750 MG: 750 INJECTION, POWDER, LYOPHILIZED, FOR SOLUTION INTRAVENOUS at 04:25

## 2024-04-04 RX ADMIN — DIVALPROEX SODIUM 250 MG: 250 TABLET, DELAYED RELEASE ORAL at 20:54

## 2024-04-04 RX ADMIN — METOPROLOL TARTRATE 12.5 MG: 25 TABLET, FILM COATED ORAL at 20:45

## 2024-04-04 RX ADMIN — ASPIRIN 81 MG: 81 TABLET, COATED ORAL at 09:55

## 2024-04-04 RX ADMIN — SODIUM CHLORIDE, PRESERVATIVE FREE 10 ML: 5 INJECTION INTRAVENOUS at 09:58

## 2024-04-04 RX ADMIN — ATORVASTATIN CALCIUM 20 MG: 20 TABLET, FILM COATED ORAL at 09:56

## 2024-04-04 ASSESSMENT — PAIN SCALES - GENERAL
PAINLEVEL_OUTOF10: 6
PAINLEVEL_OUTOF10: 0

## 2024-04-04 ASSESSMENT — PAIN DESCRIPTION - ORIENTATION: ORIENTATION: RIGHT

## 2024-04-04 ASSESSMENT — PAIN DESCRIPTION - LOCATION: LOCATION: ABDOMEN;FLANK

## 2024-04-04 ASSESSMENT — PAIN DESCRIPTION - DESCRIPTORS: DESCRIPTORS: ACHING

## 2024-04-05 ENCOUNTER — APPOINTMENT (OUTPATIENT)
Facility: HOSPITAL | Age: 69
DRG: 853 | End: 2024-04-05
Payer: COMMERCIAL

## 2024-04-05 LAB
ANION GAP SERPL CALC-SCNC: 4 MMOL/L (ref 5–15)
BASOPHILS # BLD: 0 K/UL (ref 0–0.1)
BASOPHILS NFR BLD: 0 % (ref 0–1)
BUN SERPL-MCNC: 11 MG/DL (ref 6–20)
BUN/CREAT SERPL: 13 (ref 12–20)
CALCIUM SERPL-MCNC: 8.4 MG/DL (ref 8.5–10.1)
CHLORIDE SERPL-SCNC: 98 MMOL/L (ref 97–108)
CO2 SERPL-SCNC: 28 MMOL/L (ref 21–32)
CREAT SERPL-MCNC: 0.88 MG/DL (ref 0.55–1.02)
DIFFERENTIAL METHOD BLD: ABNORMAL
EKG ATRIAL RATE: 120 BPM
EKG ATRIAL RATE: 375 BPM
EKG DIAGNOSIS: NORMAL
EKG DIAGNOSIS: NORMAL
EKG P AXIS: 179 DEGREES
EKG P AXIS: 54 DEGREES
EKG P-R INTERVAL: 172 MS
EKG P-R INTERVAL: 182 MS
EKG Q-T INTERVAL: 280 MS
EKG Q-T INTERVAL: 400 MS
EKG QRS DURATION: 56 MS
EKG QRS DURATION: 60 MS
EKG QTC CALCULATION (BAZETT): 395 MS
EKG QTC CALCULATION (BAZETT): 610 MS
EKG R AXIS: -32 DEGREES
EKG R AXIS: 2 DEGREES
EKG T AXIS: 0 DEGREES
EKG T AXIS: 133 DEGREES
EKG VENTRICULAR RATE: 120 BPM
EKG VENTRICULAR RATE: 140 BPM
EOSINOPHIL # BLD: 0 K/UL (ref 0–0.4)
EOSINOPHIL NFR BLD: 0 % (ref 0–7)
ERYTHROCYTE [DISTWIDTH] IN BLOOD BY AUTOMATED COUNT: 16.6 % (ref 11.5–14.5)
GLUCOSE SERPL-MCNC: 105 MG/DL (ref 65–100)
HCT VFR BLD AUTO: 23.9 % (ref 35–47)
HGB BLD-MCNC: 7.8 G/DL (ref 11.5–16)
IMM GRANULOCYTES # BLD AUTO: 0.1 K/UL (ref 0–0.04)
IMM GRANULOCYTES NFR BLD AUTO: 1 % (ref 0–0.5)
IRON SATN MFR SERPL: 4 % (ref 20–50)
IRON SERPL-MCNC: 8 UG/DL (ref 35–150)
LYMPHOCYTES # BLD: 1.2 K/UL (ref 0.8–3.5)
LYMPHOCYTES NFR BLD: 16 % (ref 12–49)
MCH RBC QN AUTO: 28.2 PG (ref 26–34)
MCHC RBC AUTO-ENTMCNC: 32.6 G/DL (ref 30–36.5)
MCV RBC AUTO: 86.3 FL (ref 80–99)
MONOCYTES # BLD: 1 K/UL (ref 0–1)
MONOCYTES NFR BLD: 14 % (ref 5–13)
NEUTS SEG # BLD: 5.1 K/UL (ref 1.8–8)
NEUTS SEG NFR BLD: 69 % (ref 32–75)
NRBC # BLD: 0 K/UL (ref 0–0.01)
NRBC BLD-RTO: 0 PER 100 WBC
PLATELET # BLD AUTO: 337 K/UL (ref 150–400)
PMV BLD AUTO: 10.4 FL (ref 8.9–12.9)
POTASSIUM SERPL-SCNC: 3.4 MMOL/L (ref 3.5–5.1)
RBC # BLD AUTO: 2.77 M/UL (ref 3.8–5.2)
SODIUM SERPL-SCNC: 130 MMOL/L (ref 136–145)
T4 FREE SERPL-MCNC: 1.6 NG/DL (ref 0.8–1.5)
TIBC SERPL-MCNC: 228 UG/DL (ref 250–450)
TSH SERPL DL<=0.05 MIU/L-ACNC: 0.96 UIU/ML (ref 0.36–3.74)
WBC # BLD AUTO: 7.4 K/UL (ref 3.6–11)

## 2024-04-05 PROCEDURE — 92610 EVALUATE SWALLOWING FUNCTION: CPT

## 2024-04-05 PROCEDURE — 84443 ASSAY THYROID STIM HORMONE: CPT

## 2024-04-05 PROCEDURE — 2580000003 HC RX 258: Performed by: INTERNAL MEDICINE

## 2024-04-05 PROCEDURE — 2060000000 HC ICU INTERMEDIATE R&B

## 2024-04-05 PROCEDURE — 85025 COMPLETE CBC W/AUTO DIFF WBC: CPT

## 2024-04-05 PROCEDURE — 2700000000 HC OXYGEN THERAPY PER DAY

## 2024-04-05 PROCEDURE — 84439 ASSAY OF FREE THYROXINE: CPT

## 2024-04-05 PROCEDURE — 6360000002 HC RX W HCPCS: Performed by: STUDENT IN AN ORGANIZED HEALTH CARE EDUCATION/TRAINING PROGRAM

## 2024-04-05 PROCEDURE — 36415 COLL VENOUS BLD VENIPUNCTURE: CPT

## 2024-04-05 PROCEDURE — 6360000002 HC RX W HCPCS: Performed by: INTERNAL MEDICINE

## 2024-04-05 PROCEDURE — 2580000003 HC RX 258: Performed by: STUDENT IN AN ORGANIZED HEALTH CARE EDUCATION/TRAINING PROGRAM

## 2024-04-05 PROCEDURE — 71045 X-RAY EXAM CHEST 1 VIEW: CPT

## 2024-04-05 PROCEDURE — 6370000000 HC RX 637 (ALT 250 FOR IP): Performed by: INTERNAL MEDICINE

## 2024-04-05 PROCEDURE — 80048 BASIC METABOLIC PNL TOTAL CA: CPT

## 2024-04-05 PROCEDURE — 99233 SBSQ HOSP IP/OBS HIGH 50: CPT | Performed by: FAMILY MEDICINE

## 2024-04-05 RX ORDER — SODIUM CHLORIDE 9 MG/ML
INJECTION, SOLUTION INTRAVENOUS CONTINUOUS
Status: DISCONTINUED | OUTPATIENT
Start: 2024-04-05 | End: 2024-04-08

## 2024-04-05 RX ADMIN — SODIUM CHLORIDE: 9 INJECTION, SOLUTION INTRAVENOUS at 07:52

## 2024-04-05 RX ADMIN — ENOXAPARIN SODIUM 40 MG: 100 INJECTION SUBCUTANEOUS at 10:58

## 2024-04-05 RX ADMIN — VANCOMYCIN HYDROCHLORIDE 1000 MG: 1 INJECTION, POWDER, LYOPHILIZED, FOR SOLUTION INTRAVENOUS at 04:41

## 2024-04-05 RX ADMIN — PIPERACILLIN AND TAZOBACTAM 3375 MG: 3; .375 INJECTION, POWDER, LYOPHILIZED, FOR SOLUTION INTRAVENOUS at 11:58

## 2024-04-05 RX ADMIN — PIPERACILLIN AND TAZOBACTAM 3375 MG: 3; .375 INJECTION, POWDER, LYOPHILIZED, FOR SOLUTION INTRAVENOUS at 05:43

## 2024-04-05 RX ADMIN — DIAZEPAM 1 MG: 2 TABLET ORAL at 20:13

## 2024-04-05 RX ADMIN — PIPERACILLIN AND TAZOBACTAM 3375 MG: 3; .375 INJECTION, POWDER, LYOPHILIZED, FOR SOLUTION INTRAVENOUS at 20:20

## 2024-04-05 RX ADMIN — ATORVASTATIN CALCIUM 20 MG: 20 TABLET, FILM COATED ORAL at 10:59

## 2024-04-05 RX ADMIN — DIVALPROEX SODIUM 250 MG: 250 TABLET, DELAYED RELEASE ORAL at 10:59

## 2024-04-05 RX ADMIN — SENNOSIDES AND DOCUSATE SODIUM 1 TABLET: 50; 8.6 TABLET ORAL at 10:59

## 2024-04-05 RX ADMIN — DIVALPROEX SODIUM 250 MG: 250 TABLET, DELAYED RELEASE ORAL at 20:13

## 2024-04-05 RX ADMIN — SODIUM CHLORIDE, PRESERVATIVE FREE 10 ML: 5 INJECTION INTRAVENOUS at 20:14

## 2024-04-05 RX ADMIN — SODIUM CHLORIDE: 9 INJECTION, SOLUTION INTRAVENOUS at 11:57

## 2024-04-05 RX ADMIN — ASPIRIN 81 MG: 81 TABLET, COATED ORAL at 10:59

## 2024-04-05 RX ADMIN — METOPROLOL TARTRATE 12.5 MG: 25 TABLET, FILM COATED ORAL at 10:59

## 2024-04-05 RX ADMIN — SODIUM CHLORIDE: 9 INJECTION, SOLUTION INTRAVENOUS at 17:14

## 2024-04-05 RX ADMIN — METOPROLOL TARTRATE 12.5 MG: 25 TABLET, FILM COATED ORAL at 20:13

## 2024-04-05 RX ADMIN — ACETAMINOPHEN 650 MG: 325 TABLET ORAL at 20:13

## 2024-04-05 RX ADMIN — MIRTAZAPINE 15 MG: 15 TABLET, FILM COATED ORAL at 20:13

## 2024-04-05 RX ADMIN — SODIUM CHLORIDE: 9 INJECTION, SOLUTION INTRAVENOUS at 20:19

## 2024-04-05 ASSESSMENT — PAIN SCALES - GENERAL
PAINLEVEL_OUTOF10: 0

## 2024-04-06 LAB
ANION GAP SERPL CALC-SCNC: 4 MMOL/L (ref 5–15)
BASOPHILS # BLD: 0 K/UL (ref 0–0.1)
BASOPHILS NFR BLD: 0 % (ref 0–1)
BUN SERPL-MCNC: 9 MG/DL (ref 6–20)
BUN/CREAT SERPL: 12 (ref 12–20)
CALCIUM SERPL-MCNC: 8.5 MG/DL (ref 8.5–10.1)
CHLORIDE SERPL-SCNC: 102 MMOL/L (ref 97–108)
CO2 SERPL-SCNC: 27 MMOL/L (ref 21–32)
CREAT SERPL-MCNC: 0.75 MG/DL (ref 0.55–1.02)
DIFFERENTIAL METHOD BLD: ABNORMAL
EOSINOPHIL # BLD: 0 K/UL (ref 0–0.4)
EOSINOPHIL NFR BLD: 0 % (ref 0–7)
ERYTHROCYTE [DISTWIDTH] IN BLOOD BY AUTOMATED COUNT: 16.9 % (ref 11.5–14.5)
GLUCOSE SERPL-MCNC: 82 MG/DL (ref 65–100)
HCT VFR BLD AUTO: 20.4 % (ref 35–47)
HCT VFR BLD AUTO: 21.8 % (ref 35–47)
HGB BLD-MCNC: 6.8 G/DL (ref 11.5–16)
HGB BLD-MCNC: 6.9 G/DL (ref 11.5–16)
HISTORY CHECK: NORMAL
IMM GRANULOCYTES # BLD AUTO: 0.1 K/UL (ref 0–0.04)
IMM GRANULOCYTES NFR BLD AUTO: 2 % (ref 0–0.5)
LYMPHOCYTES # BLD: 1.4 K/UL (ref 0.8–3.5)
LYMPHOCYTES NFR BLD: 23 % (ref 12–49)
MCH RBC QN AUTO: 28 PG (ref 26–34)
MCHC RBC AUTO-ENTMCNC: 31.7 G/DL (ref 30–36.5)
MCV RBC AUTO: 88.6 FL (ref 80–99)
MONOCYTES # BLD: 0.9 K/UL (ref 0–1)
MONOCYTES NFR BLD: 15 % (ref 5–13)
NEUTS SEG # BLD: 3.5 K/UL (ref 1.8–8)
NEUTS SEG NFR BLD: 60 % (ref 32–75)
NRBC # BLD: 0 K/UL (ref 0–0.01)
NRBC BLD-RTO: 0 PER 100 WBC
PLATELET # BLD AUTO: 289 K/UL (ref 150–400)
PMV BLD AUTO: 10.5 FL (ref 8.9–12.9)
POTASSIUM SERPL-SCNC: 3.2 MMOL/L (ref 3.5–5.1)
RBC # BLD AUTO: 2.46 M/UL (ref 3.8–5.2)
RBC MORPH BLD: ABNORMAL
SODIUM SERPL-SCNC: 133 MMOL/L (ref 136–145)
WBC # BLD AUTO: 5.9 K/UL (ref 3.6–11)

## 2024-04-06 PROCEDURE — 2060000000 HC ICU INTERMEDIATE R&B

## 2024-04-06 PROCEDURE — 86902 BLOOD TYPE ANTIGEN DONOR EA: CPT

## 2024-04-06 PROCEDURE — 36415 COLL VENOUS BLD VENIPUNCTURE: CPT

## 2024-04-06 PROCEDURE — 6360000002 HC RX W HCPCS: Performed by: STUDENT IN AN ORGANIZED HEALTH CARE EDUCATION/TRAINING PROGRAM

## 2024-04-06 PROCEDURE — 86870 RBC ANTIBODY IDENTIFICATION: CPT

## 2024-04-06 PROCEDURE — 6360000002 HC RX W HCPCS: Performed by: INTERNAL MEDICINE

## 2024-04-06 PROCEDURE — 6370000000 HC RX 637 (ALT 250 FOR IP): Performed by: STUDENT IN AN ORGANIZED HEALTH CARE EDUCATION/TRAINING PROGRAM

## 2024-04-06 PROCEDURE — 2580000003 HC RX 258: Performed by: INTERNAL MEDICINE

## 2024-04-06 PROCEDURE — 80048 BASIC METABOLIC PNL TOTAL CA: CPT

## 2024-04-06 PROCEDURE — 2580000003 HC RX 258: Performed by: STUDENT IN AN ORGANIZED HEALTH CARE EDUCATION/TRAINING PROGRAM

## 2024-04-06 PROCEDURE — 86920 COMPATIBILITY TEST SPIN: CPT

## 2024-04-06 PROCEDURE — 86880 COOMBS TEST DIRECT: CPT

## 2024-04-06 PROCEDURE — 86922 COMPATIBILITY TEST ANTIGLOB: CPT

## 2024-04-06 PROCEDURE — 86900 BLOOD TYPING SEROLOGIC ABO: CPT

## 2024-04-06 PROCEDURE — 2700000000 HC OXYGEN THERAPY PER DAY

## 2024-04-06 PROCEDURE — 86850 RBC ANTIBODY SCREEN: CPT

## 2024-04-06 PROCEDURE — 86901 BLOOD TYPING SEROLOGIC RH(D): CPT

## 2024-04-06 PROCEDURE — 85018 HEMOGLOBIN: CPT

## 2024-04-06 PROCEDURE — 85025 COMPLETE CBC W/AUTO DIFF WBC: CPT

## 2024-04-06 PROCEDURE — 6370000000 HC RX 637 (ALT 250 FOR IP): Performed by: INTERNAL MEDICINE

## 2024-04-06 PROCEDURE — 85014 HEMATOCRIT: CPT

## 2024-04-06 PROCEDURE — 86921 COMPATIBILITY TEST INCUBATE: CPT

## 2024-04-06 RX ORDER — SODIUM CHLORIDE 9 MG/ML
INJECTION, SOLUTION INTRAVENOUS PRN
Status: DISCONTINUED | OUTPATIENT
Start: 2024-04-06 | End: 2024-04-13

## 2024-04-06 RX ADMIN — DIVALPROEX SODIUM 250 MG: 250 TABLET, DELAYED RELEASE ORAL at 08:51

## 2024-04-06 RX ADMIN — ENOXAPARIN SODIUM 40 MG: 100 INJECTION SUBCUTANEOUS at 14:00

## 2024-04-06 RX ADMIN — SODIUM CHLORIDE, PRESERVATIVE FREE 10 ML: 5 INJECTION INTRAVENOUS at 08:56

## 2024-04-06 RX ADMIN — METOPROLOL TARTRATE 12.5 MG: 25 TABLET, FILM COATED ORAL at 08:57

## 2024-04-06 RX ADMIN — SODIUM CHLORIDE: 9 INJECTION, SOLUTION INTRAVENOUS at 23:59

## 2024-04-06 RX ADMIN — METOPROLOL TARTRATE 12.5 MG: 25 TABLET, FILM COATED ORAL at 19:58

## 2024-04-06 RX ADMIN — SODIUM CHLORIDE: 9 INJECTION, SOLUTION INTRAVENOUS at 14:17

## 2024-04-06 RX ADMIN — PIPERACILLIN AND TAZOBACTAM 3375 MG: 3; .375 INJECTION, POWDER, LYOPHILIZED, FOR SOLUTION INTRAVENOUS at 19:57

## 2024-04-06 RX ADMIN — ATORVASTATIN CALCIUM 20 MG: 20 TABLET, FILM COATED ORAL at 08:53

## 2024-04-06 RX ADMIN — POTASSIUM BICARBONATE 40 MEQ: 782 TABLET, EFFERVESCENT ORAL at 08:51

## 2024-04-06 RX ADMIN — PIPERACILLIN AND TAZOBACTAM 3375 MG: 3; .375 INJECTION, POWDER, LYOPHILIZED, FOR SOLUTION INTRAVENOUS at 12:06

## 2024-04-06 RX ADMIN — IRON SUCROSE 300 MG: 20 INJECTION, SOLUTION INTRAVENOUS at 00:28

## 2024-04-06 RX ADMIN — SODIUM CHLORIDE: 9 INJECTION, SOLUTION INTRAVENOUS at 19:53

## 2024-04-06 RX ADMIN — SENNOSIDES AND DOCUSATE SODIUM 1 TABLET: 50; 8.6 TABLET ORAL at 08:51

## 2024-04-06 RX ADMIN — SODIUM CHLORIDE, PRESERVATIVE FREE 10 ML: 5 INJECTION INTRAVENOUS at 19:58

## 2024-04-06 RX ADMIN — SODIUM CHLORIDE: 9 INJECTION, SOLUTION INTRAVENOUS at 03:24

## 2024-04-06 RX ADMIN — ASPIRIN 81 MG: 81 TABLET, COATED ORAL at 08:51

## 2024-04-06 RX ADMIN — DIAZEPAM 1 MG: 2 TABLET ORAL at 19:58

## 2024-04-06 RX ADMIN — DIVALPROEX SODIUM 250 MG: 250 TABLET, DELAYED RELEASE ORAL at 19:58

## 2024-04-06 RX ADMIN — MIRTAZAPINE 15 MG: 15 TABLET, FILM COATED ORAL at 19:58

## 2024-04-06 RX ADMIN — POLYETHYLENE GLYCOL 3350 17 G: 17 POWDER, FOR SOLUTION ORAL at 17:38

## 2024-04-06 RX ADMIN — PIPERACILLIN AND TAZOBACTAM 3375 MG: 3; .375 INJECTION, POWDER, LYOPHILIZED, FOR SOLUTION INTRAVENOUS at 03:33

## 2024-04-06 RX ADMIN — ACETAMINOPHEN 650 MG: 325 TABLET ORAL at 19:57

## 2024-04-06 ASSESSMENT — PAIN SCALES - GENERAL
PAINLEVEL_OUTOF10: 0

## 2024-04-06 NOTE — CONSENT
Informed Consent for Blood Component Transfusion Note    I have discussed with the brother the rationale for blood component transfusion; its benefits in treating or preventing fatigue, organ damage, or death; and its risk which includes mild transfusion reactions, rare risk of blood borne infection, or more serious but rare reactions. I have discussed the alternatives to transfusion, including the risk and consequences of not receiving transfusion. The brother had an opportunity to ask questions and had agreed to proceed with transfusion of blood components.    Electronically signed by Barb Kinney MD on 4/6/24 at 1:23 PM EDT

## 2024-04-07 LAB
BASOPHILS # BLD: 0.1 K/UL (ref 0–0.1)
BASOPHILS NFR BLD: 1 % (ref 0–1)
DIFFERENTIAL METHOD BLD: ABNORMAL
EOSINOPHIL # BLD: 0 K/UL (ref 0–0.4)
EOSINOPHIL NFR BLD: 0 % (ref 0–7)
ERYTHROCYTE [DISTWIDTH] IN BLOOD BY AUTOMATED COUNT: 16.5 % (ref 11.5–14.5)
HCT VFR BLD AUTO: 29.8 % (ref 35–47)
HEMOCCULT STL QL: POSITIVE
HGB BLD-MCNC: 9.6 G/DL (ref 11.5–16)
IMM GRANULOCYTES # BLD AUTO: 0 K/UL (ref 0–0.04)
IMM GRANULOCYTES NFR BLD AUTO: 0 % (ref 0–0.5)
LYMPHOCYTES # BLD: 1 K/UL (ref 0.8–3.5)
LYMPHOCYTES NFR BLD: 8 % (ref 12–49)
MCH RBC QN AUTO: 28.6 PG (ref 26–34)
MCHC RBC AUTO-ENTMCNC: 32.2 G/DL (ref 30–36.5)
MCV RBC AUTO: 88.7 FL (ref 80–99)
METAMYELOCYTES NFR BLD MANUAL: 1 %
MONOCYTES # BLD: 1 K/UL (ref 0–1)
MONOCYTES NFR BLD: 8 % (ref 5–13)
MYELOCYTES NFR BLD MANUAL: 1 %
NEUTS SEG # BLD: 10 K/UL (ref 1.8–8)
NEUTS SEG NFR BLD: 81 % (ref 32–75)
NRBC # BLD: 0 K/UL (ref 0–0.01)
NRBC BLD-RTO: 0 PER 100 WBC
PLATELET # BLD AUTO: 325 K/UL (ref 150–400)
RBC # BLD AUTO: 3.36 M/UL (ref 3.8–5.2)
RBC MORPH BLD: ABNORMAL
WBC # BLD AUTO: 12.3 K/UL (ref 3.6–11)

## 2024-04-07 PROCEDURE — 6370000000 HC RX 637 (ALT 250 FOR IP): Performed by: INTERNAL MEDICINE

## 2024-04-07 PROCEDURE — 82272 OCCULT BLD FECES 1-3 TESTS: CPT

## 2024-04-07 PROCEDURE — 2700000000 HC OXYGEN THERAPY PER DAY

## 2024-04-07 PROCEDURE — 2580000003 HC RX 258: Performed by: INTERNAL MEDICINE

## 2024-04-07 PROCEDURE — 36415 COLL VENOUS BLD VENIPUNCTURE: CPT

## 2024-04-07 PROCEDURE — 6360000002 HC RX W HCPCS: Performed by: STUDENT IN AN ORGANIZED HEALTH CARE EDUCATION/TRAINING PROGRAM

## 2024-04-07 PROCEDURE — 6360000002 HC RX W HCPCS: Performed by: INTERNAL MEDICINE

## 2024-04-07 PROCEDURE — 2580000003 HC RX 258: Performed by: STUDENT IN AN ORGANIZED HEALTH CARE EDUCATION/TRAINING PROGRAM

## 2024-04-07 PROCEDURE — 85025 COMPLETE CBC W/AUTO DIFF WBC: CPT

## 2024-04-07 PROCEDURE — 2060000000 HC ICU INTERMEDIATE R&B

## 2024-04-07 PROCEDURE — 36430 TRANSFUSION BLD/BLD COMPNT: CPT

## 2024-04-07 PROCEDURE — P9016 RBC LEUKOCYTES REDUCED: HCPCS

## 2024-04-07 RX ADMIN — DIAZEPAM 1 MG: 2 TABLET ORAL at 20:26

## 2024-04-07 RX ADMIN — MIRTAZAPINE 15 MG: 15 TABLET, FILM COATED ORAL at 20:25

## 2024-04-07 RX ADMIN — SODIUM CHLORIDE, PRESERVATIVE FREE 10 ML: 5 INJECTION INTRAVENOUS at 20:27

## 2024-04-07 RX ADMIN — PIPERACILLIN AND TAZOBACTAM 3375 MG: 3; .375 INJECTION, POWDER, LYOPHILIZED, FOR SOLUTION INTRAVENOUS at 03:36

## 2024-04-07 RX ADMIN — PIPERACILLIN AND TAZOBACTAM 3375 MG: 3; .375 INJECTION, POWDER, LYOPHILIZED, FOR SOLUTION INTRAVENOUS at 12:46

## 2024-04-07 RX ADMIN — ENOXAPARIN SODIUM 40 MG: 100 INJECTION SUBCUTANEOUS at 10:00

## 2024-04-07 RX ADMIN — SODIUM CHLORIDE: 9 INJECTION, SOLUTION INTRAVENOUS at 12:52

## 2024-04-07 RX ADMIN — IRON SUCROSE 300 MG: 20 INJECTION, SOLUTION INTRAVENOUS at 23:56

## 2024-04-07 RX ADMIN — ASPIRIN 81 MG: 81 TABLET, COATED ORAL at 10:00

## 2024-04-07 RX ADMIN — DIVALPROEX SODIUM 250 MG: 250 TABLET, DELAYED RELEASE ORAL at 10:00

## 2024-04-07 RX ADMIN — PIPERACILLIN AND TAZOBACTAM 3375 MG: 3; .375 INJECTION, POWDER, LYOPHILIZED, FOR SOLUTION INTRAVENOUS at 19:51

## 2024-04-07 RX ADMIN — IRON SUCROSE 300 MG: 20 INJECTION, SOLUTION INTRAVENOUS at 00:10

## 2024-04-07 RX ADMIN — SODIUM CHLORIDE, PRESERVATIVE FREE 10 ML: 5 INJECTION INTRAVENOUS at 10:08

## 2024-04-07 RX ADMIN — ATORVASTATIN CALCIUM 20 MG: 20 TABLET, FILM COATED ORAL at 10:01

## 2024-04-07 RX ADMIN — METOPROLOL TARTRATE 12.5 MG: 25 TABLET, FILM COATED ORAL at 10:01

## 2024-04-07 RX ADMIN — DIVALPROEX SODIUM 250 MG: 250 TABLET, DELAYED RELEASE ORAL at 20:25

## 2024-04-07 RX ADMIN — SODIUM CHLORIDE: 9 INJECTION, SOLUTION INTRAVENOUS at 22:55

## 2024-04-07 RX ADMIN — ACETAMINOPHEN 650 MG: 325 TABLET ORAL at 22:49

## 2024-04-07 RX ADMIN — METOPROLOL TARTRATE 12.5 MG: 25 TABLET, FILM COATED ORAL at 20:25

## 2024-04-07 ASSESSMENT — PAIN SCALES - GENERAL
PAINLEVEL_OUTOF10: 0

## 2024-04-08 ENCOUNTER — APPOINTMENT (OUTPATIENT)
Facility: HOSPITAL | Age: 69
DRG: 853 | End: 2024-04-08
Payer: COMMERCIAL

## 2024-04-08 LAB
ANION GAP SERPL CALC-SCNC: 7 MMOL/L (ref 5–15)
BASOPHILS # BLD: 0 K/UL (ref 0–0.1)
BASOPHILS NFR BLD: 0 % (ref 0–1)
BUN SERPL-MCNC: 7 MG/DL (ref 6–20)
BUN/CREAT SERPL: 11 (ref 12–20)
CALCIUM SERPL-MCNC: 7.5 MG/DL (ref 8.5–10.1)
CHLORIDE SERPL-SCNC: 103 MMOL/L (ref 97–108)
CO2 SERPL-SCNC: 26 MMOL/L (ref 21–32)
CREAT SERPL-MCNC: 0.61 MG/DL (ref 0.55–1.02)
DIFFERENTIAL METHOD BLD: ABNORMAL
EOSINOPHIL # BLD: 0 K/UL (ref 0–0.4)
EOSINOPHIL NFR BLD: 0 % (ref 0–7)
ERYTHROCYTE [DISTWIDTH] IN BLOOD BY AUTOMATED COUNT: 16.5 % (ref 11.5–14.5)
GLUCOSE SERPL-MCNC: 101 MG/DL (ref 65–100)
HCT VFR BLD AUTO: 23.8 % (ref 35–47)
HGB BLD-MCNC: 7.5 G/DL (ref 11.5–16)
IMM GRANULOCYTES # BLD AUTO: 0 K/UL (ref 0–0.04)
IMM GRANULOCYTES NFR BLD AUTO: 0 % (ref 0–0.5)
LYMPHOCYTES # BLD: 2.1 K/UL (ref 0.8–3.5)
LYMPHOCYTES NFR BLD: 23 % (ref 12–49)
MCH RBC QN AUTO: 27.7 PG (ref 26–34)
MCHC RBC AUTO-ENTMCNC: 31.5 G/DL (ref 30–36.5)
MCV RBC AUTO: 87.8 FL (ref 80–99)
MONOCYTES # BLD: 1.4 K/UL (ref 0–1)
MONOCYTES NFR BLD: 15 % (ref 5–13)
NEUTS BAND NFR BLD MANUAL: 2 %
NEUTS SEG # BLD: 5.7 K/UL (ref 1.8–8)
NEUTS SEG NFR BLD: 60 % (ref 32–75)
NRBC # BLD: 0.03 K/UL (ref 0–0.01)
NRBC BLD-RTO: 0.3 PER 100 WBC
NT PRO BNP: 8126 PG/ML
PLATELET # BLD AUTO: 264 K/UL (ref 150–400)
PMV BLD AUTO: 10.1 FL (ref 8.9–12.9)
POTASSIUM SERPL-SCNC: 3.6 MMOL/L (ref 3.5–5.1)
RBC # BLD AUTO: 2.71 M/UL (ref 3.8–5.2)
RBC MORPH BLD: ABNORMAL
SODIUM SERPL-SCNC: 136 MMOL/L (ref 136–145)
WBC # BLD AUTO: 9.2 K/UL (ref 3.6–11)

## 2024-04-08 PROCEDURE — 97161 PT EVAL LOW COMPLEX 20 MIN: CPT | Performed by: PHYSICAL THERAPIST

## 2024-04-08 PROCEDURE — 97530 THERAPEUTIC ACTIVITIES: CPT | Performed by: PHYSICAL THERAPIST

## 2024-04-08 PROCEDURE — 2580000003 HC RX 258: Performed by: INTERNAL MEDICINE

## 2024-04-08 PROCEDURE — 6370000000 HC RX 637 (ALT 250 FOR IP): Performed by: PHYSICIAN ASSISTANT

## 2024-04-08 PROCEDURE — 71045 X-RAY EXAM CHEST 1 VIEW: CPT

## 2024-04-08 PROCEDURE — 6370000000 HC RX 637 (ALT 250 FOR IP): Performed by: INTERNAL MEDICINE

## 2024-04-08 PROCEDURE — 85025 COMPLETE CBC W/AUTO DIFF WBC: CPT

## 2024-04-08 PROCEDURE — 92526 ORAL FUNCTION THERAPY: CPT

## 2024-04-08 PROCEDURE — 36415 COLL VENOUS BLD VENIPUNCTURE: CPT

## 2024-04-08 PROCEDURE — 6360000002 HC RX W HCPCS: Performed by: INTERNAL MEDICINE

## 2024-04-08 PROCEDURE — 83880 ASSAY OF NATRIURETIC PEPTIDE: CPT

## 2024-04-08 PROCEDURE — 2700000000 HC OXYGEN THERAPY PER DAY

## 2024-04-08 PROCEDURE — 80048 BASIC METABOLIC PNL TOTAL CA: CPT

## 2024-04-08 PROCEDURE — 2060000000 HC ICU INTERMEDIATE R&B

## 2024-04-08 RX ORDER — PANTOPRAZOLE SODIUM 40 MG/1
40 TABLET, DELAYED RELEASE ORAL
Status: DISCONTINUED | OUTPATIENT
Start: 2024-04-08 | End: 2024-05-07 | Stop reason: HOSPADM

## 2024-04-08 RX ORDER — FUROSEMIDE 10 MG/ML
20 INJECTION INTRAMUSCULAR; INTRAVENOUS ONCE
Status: COMPLETED | OUTPATIENT
Start: 2024-04-08 | End: 2024-04-08

## 2024-04-08 RX ADMIN — DIAZEPAM 1 MG: 2 TABLET ORAL at 20:50

## 2024-04-08 RX ADMIN — SENNOSIDES AND DOCUSATE SODIUM 1 TABLET: 50; 8.6 TABLET ORAL at 09:23

## 2024-04-08 RX ADMIN — ASPIRIN 81 MG: 81 TABLET, COATED ORAL at 09:24

## 2024-04-08 RX ADMIN — DIVALPROEX SODIUM 250 MG: 250 TABLET, DELAYED RELEASE ORAL at 20:50

## 2024-04-08 RX ADMIN — METOPROLOL TARTRATE 12.5 MG: 25 TABLET, FILM COATED ORAL at 09:24

## 2024-04-08 RX ADMIN — SODIUM CHLORIDE: 9 INJECTION, SOLUTION INTRAVENOUS at 08:04

## 2024-04-08 RX ADMIN — PANTOPRAZOLE SODIUM 40 MG: 40 TABLET, DELAYED RELEASE ORAL at 17:07

## 2024-04-08 RX ADMIN — PIPERACILLIN AND TAZOBACTAM 3375 MG: 3; .375 INJECTION, POWDER, LYOPHILIZED, FOR SOLUTION INTRAVENOUS at 03:31

## 2024-04-08 RX ADMIN — DIVALPROEX SODIUM 250 MG: 250 TABLET, DELAYED RELEASE ORAL at 09:26

## 2024-04-08 RX ADMIN — VANCOMYCIN HYDROCHLORIDE 1750 MG: 10 INJECTION, POWDER, LYOPHILIZED, FOR SOLUTION INTRAVENOUS at 17:12

## 2024-04-08 RX ADMIN — PIPERACILLIN AND TAZOBACTAM 3375 MG: 3; .375 INJECTION, POWDER, LYOPHILIZED, FOR SOLUTION INTRAVENOUS at 14:02

## 2024-04-08 RX ADMIN — ATORVASTATIN CALCIUM 20 MG: 20 TABLET, FILM COATED ORAL at 09:26

## 2024-04-08 RX ADMIN — METOPROLOL TARTRATE 12.5 MG: 25 TABLET, FILM COATED ORAL at 20:50

## 2024-04-08 RX ADMIN — PIPERACILLIN AND TAZOBACTAM 3375 MG: 3; .375 INJECTION, POWDER, LYOPHILIZED, FOR SOLUTION INTRAVENOUS at 20:47

## 2024-04-08 RX ADMIN — FUROSEMIDE 20 MG: 10 INJECTION, SOLUTION INTRAMUSCULAR; INTRAVENOUS at 14:49

## 2024-04-08 RX ADMIN — SODIUM CHLORIDE, PRESERVATIVE FREE 10 ML: 5 INJECTION INTRAVENOUS at 20:53

## 2024-04-08 RX ADMIN — SODIUM CHLORIDE, PRESERVATIVE FREE 10 ML: 5 INJECTION INTRAVENOUS at 09:27

## 2024-04-08 RX ADMIN — MIRTAZAPINE 15 MG: 15 TABLET, FILM COATED ORAL at 20:50

## 2024-04-08 ASSESSMENT — PAIN SCALES - GENERAL: PAINLEVEL_OUTOF10: 0

## 2024-04-09 ENCOUNTER — APPOINTMENT (OUTPATIENT)
Facility: HOSPITAL | Age: 69
DRG: 853 | End: 2024-04-09
Attending: INTERNAL MEDICINE
Payer: COMMERCIAL

## 2024-04-09 LAB
ANION GAP SERPL CALC-SCNC: 6 MMOL/L (ref 5–15)
BACTERIA SPEC CULT: NORMAL
BACTERIA SPEC CULT: NORMAL
BASOPHILS # BLD: 0.2 K/UL (ref 0–0.1)
BASOPHILS NFR BLD: 2 % (ref 0–1)
BUN SERPL-MCNC: 9 MG/DL (ref 6–20)
BUN/CREAT SERPL: 13 (ref 12–20)
CALCIUM SERPL-MCNC: 8 MG/DL (ref 8.5–10.1)
CHLORIDE SERPL-SCNC: 100 MMOL/L (ref 97–108)
CO2 SERPL-SCNC: 27 MMOL/L (ref 21–32)
CREAT SERPL-MCNC: 0.68 MG/DL (ref 0.55–1.02)
DIFFERENTIAL METHOD BLD: ABNORMAL
EOSINOPHIL # BLD: 0 K/UL (ref 0–0.4)
EOSINOPHIL NFR BLD: 0 % (ref 0–7)
ERYTHROCYTE [DISTWIDTH] IN BLOOD BY AUTOMATED COUNT: 17.1 % (ref 11.5–14.5)
GLUCOSE SERPL-MCNC: 80 MG/DL (ref 65–100)
HCT VFR BLD AUTO: 18.4 % (ref 35–47)
HCT VFR BLD AUTO: 31 % (ref 35–47)
HGB BLD-MCNC: 10.1 G/DL (ref 11.5–16)
HGB BLD-MCNC: 5.9 G/DL (ref 11.5–16)
HGB BLD-MCNC: 8.7 G/DL (ref 11.5–16)
HISTORY CHECK: NORMAL
IMM GRANULOCYTES # BLD AUTO: 0 K/UL (ref 0–0.04)
IMM GRANULOCYTES NFR BLD AUTO: 0 % (ref 0–0.5)
LYMPHOCYTES # BLD: 1.5 K/UL (ref 0.8–3.5)
LYMPHOCYTES NFR BLD: 17 % (ref 12–49)
MCH RBC QN AUTO: 28.2 PG (ref 26–34)
MCHC RBC AUTO-ENTMCNC: 32.1 G/DL (ref 30–36.5)
MCV RBC AUTO: 88 FL (ref 80–99)
MONOCYTES # BLD: 1.5 K/UL (ref 0–1)
MONOCYTES NFR BLD: 16 % (ref 5–13)
NEUTS BAND NFR BLD MANUAL: 3 %
NEUTS SEG # BLD: 5.9 K/UL (ref 1.8–8)
NEUTS SEG NFR BLD: 62 % (ref 32–75)
NRBC # BLD: 0.07 K/UL (ref 0–0.01)
NRBC BLD-RTO: 0.8 PER 100 WBC
PATH REV BLD -IMP: ABNORMAL
PATH REV BLD -IMP: NORMAL
PLATELET # BLD AUTO: 244 K/UL (ref 150–400)
PMV BLD AUTO: 9.9 FL (ref 8.9–12.9)
POTASSIUM SERPL-SCNC: 2.9 MMOL/L (ref 3.5–5.1)
POTASSIUM SERPL-SCNC: 4.2 MMOL/L (ref 3.5–5.1)
PROCALCITONIN SERPL-MCNC: 0.52 NG/ML
RBC # BLD AUTO: 2.09 M/UL (ref 3.8–5.2)
RBC MORPH BLD: ABNORMAL
RBC MORPH BLD: ABNORMAL
SERVICE CMNT-IMP: NORMAL
SERVICE CMNT-IMP: NORMAL
SODIUM SERPL-SCNC: 133 MMOL/L (ref 136–145)
WBC # BLD AUTO: 9.1 K/UL (ref 3.6–11)

## 2024-04-09 PROCEDURE — 84145 PROCALCITONIN (PCT): CPT

## 2024-04-09 PROCEDURE — 6370000000 HC RX 637 (ALT 250 FOR IP): Performed by: INTERNAL MEDICINE

## 2024-04-09 PROCEDURE — 86921 COMPATIBILITY TEST INCUBATE: CPT

## 2024-04-09 PROCEDURE — 80048 BASIC METABOLIC PNL TOTAL CA: CPT

## 2024-04-09 PROCEDURE — 6370000000 HC RX 637 (ALT 250 FOR IP): Performed by: PHYSICIAN ASSISTANT

## 2024-04-09 PROCEDURE — 2060000000 HC ICU INTERMEDIATE R&B

## 2024-04-09 PROCEDURE — 99233 SBSQ HOSP IP/OBS HIGH 50: CPT | Performed by: INTERNAL MEDICINE

## 2024-04-09 PROCEDURE — 36415 COLL VENOUS BLD VENIPUNCTURE: CPT

## 2024-04-09 PROCEDURE — 85018 HEMOGLOBIN: CPT

## 2024-04-09 PROCEDURE — 2580000003 HC RX 258: Performed by: INTERNAL MEDICINE

## 2024-04-09 PROCEDURE — 6360000002 HC RX W HCPCS: Performed by: INTERNAL MEDICINE

## 2024-04-09 PROCEDURE — 85025 COMPLETE CBC W/AUTO DIFF WBC: CPT

## 2024-04-09 PROCEDURE — 36430 TRANSFUSION BLD/BLD COMPNT: CPT

## 2024-04-09 PROCEDURE — 84132 ASSAY OF SERUM POTASSIUM: CPT

## 2024-04-09 PROCEDURE — 85014 HEMATOCRIT: CPT

## 2024-04-09 PROCEDURE — 6370000000 HC RX 637 (ALT 250 FOR IP): Performed by: NURSE PRACTITIONER

## 2024-04-09 PROCEDURE — 2700000000 HC OXYGEN THERAPY PER DAY

## 2024-04-09 PROCEDURE — P9016 RBC LEUKOCYTES REDUCED: HCPCS

## 2024-04-09 PROCEDURE — 86922 COMPATIBILITY TEST ANTIGLOB: CPT

## 2024-04-09 RX ORDER — SODIUM CHLORIDE 0.9 % (FLUSH) 0.9 %
5-40 SYRINGE (ML) INJECTION EVERY 12 HOURS SCHEDULED
Status: CANCELLED | OUTPATIENT
Start: 2024-04-09

## 2024-04-09 RX ORDER — SODIUM CHLORIDE 9 MG/ML
INJECTION, SOLUTION INTRAVENOUS PRN
Status: DISCONTINUED | OUTPATIENT
Start: 2024-04-09 | End: 2024-04-20

## 2024-04-09 RX ORDER — FUROSEMIDE 10 MG/ML
20 INJECTION INTRAMUSCULAR; INTRAVENOUS 2 TIMES DAILY
Status: DISCONTINUED | OUTPATIENT
Start: 2024-04-09 | End: 2024-04-27

## 2024-04-09 RX ORDER — SODIUM CHLORIDE 0.9 % (FLUSH) 0.9 %
5-40 SYRINGE (ML) INJECTION PRN
Status: CANCELLED | OUTPATIENT
Start: 2024-04-09

## 2024-04-09 RX ORDER — SODIUM CHLORIDE 9 MG/ML
25 INJECTION, SOLUTION INTRAVENOUS PRN
Status: CANCELLED | OUTPATIENT
Start: 2024-04-09

## 2024-04-09 RX ADMIN — ACETAMINOPHEN 650 MG: 325 TABLET ORAL at 01:49

## 2024-04-09 RX ADMIN — PIPERACILLIN AND TAZOBACTAM 3375 MG: 3; .375 INJECTION, POWDER, LYOPHILIZED, FOR SOLUTION INTRAVENOUS at 13:33

## 2024-04-09 RX ADMIN — ASPIRIN 81 MG: 81 TABLET, COATED ORAL at 09:50

## 2024-04-09 RX ADMIN — PIPERACILLIN AND TAZOBACTAM 3375 MG: 3; .375 INJECTION, POWDER, LYOPHILIZED, FOR SOLUTION INTRAVENOUS at 20:04

## 2024-04-09 RX ADMIN — FUROSEMIDE 20 MG: 10 INJECTION, SOLUTION INTRAMUSCULAR; INTRAVENOUS at 17:08

## 2024-04-09 RX ADMIN — POTASSIUM BICARBONATE 40 MEQ: 782 TABLET, EFFERVESCENT ORAL at 09:47

## 2024-04-09 RX ADMIN — DIVALPROEX SODIUM 250 MG: 250 TABLET, DELAYED RELEASE ORAL at 21:12

## 2024-04-09 RX ADMIN — DIVALPROEX SODIUM 250 MG: 250 TABLET, DELAYED RELEASE ORAL at 09:47

## 2024-04-09 RX ADMIN — PANTOPRAZOLE SODIUM 40 MG: 40 TABLET, DELAYED RELEASE ORAL at 17:08

## 2024-04-09 RX ADMIN — DIAZEPAM 1 MG: 2 TABLET ORAL at 21:12

## 2024-04-09 RX ADMIN — PANTOPRAZOLE SODIUM 40 MG: 40 TABLET, DELAYED RELEASE ORAL at 06:17

## 2024-04-09 RX ADMIN — ATORVASTATIN CALCIUM 20 MG: 20 TABLET, FILM COATED ORAL at 09:47

## 2024-04-09 RX ADMIN — VANCOMYCIN HYDROCHLORIDE 1000 MG: 1 INJECTION, POWDER, LYOPHILIZED, FOR SOLUTION INTRAVENOUS at 03:47

## 2024-04-09 RX ADMIN — MIRTAZAPINE 15 MG: 15 TABLET, FILM COATED ORAL at 21:12

## 2024-04-09 RX ADMIN — SODIUM CHLORIDE, PRESERVATIVE FREE 10 ML: 5 INJECTION INTRAVENOUS at 09:48

## 2024-04-09 RX ADMIN — FUROSEMIDE 20 MG: 10 INJECTION, SOLUTION INTRAMUSCULAR; INTRAVENOUS at 09:55

## 2024-04-09 RX ADMIN — POTASSIUM BICARBONATE 40 MEQ: 782 TABLET, EFFERVESCENT ORAL at 06:17

## 2024-04-09 RX ADMIN — PIPERACILLIN AND TAZOBACTAM 3375 MG: 3; .375 INJECTION, POWDER, LYOPHILIZED, FOR SOLUTION INTRAVENOUS at 04:58

## 2024-04-09 RX ADMIN — SODIUM CHLORIDE, PRESERVATIVE FREE 10 ML: 5 INJECTION INTRAVENOUS at 21:19

## 2024-04-09 RX ADMIN — VANCOMYCIN HYDROCHLORIDE 1000 MG: 1 INJECTION, POWDER, LYOPHILIZED, FOR SOLUTION INTRAVENOUS at 17:43

## 2024-04-09 ASSESSMENT — PAIN SCALES - GENERAL
PAINLEVEL_OUTOF10: 0

## 2024-04-09 NOTE — ACP (ADVANCE CARE PLANNING)
Advance Care Planning     General Advance Care Planning (ACP) Conversation    Date of Conversation: 4/9/2024  Conducted with: 3 of 4 siblings and son Gianni.    Healthcare Decision Maker:    Primary Decision Maker: Lorne Claire - Brother/Sister - [phone]  Click here to complete Healthcare Decision Makers including selection of the Healthcare Decision Maker Relationship (ie \"Primary\").   Today we documented Decision Maker(s) consistent with Legal Next of Kin hierarchy. Son Gianni has deferred HCDM to patient's siblings. Siblings and son all defer to Lorne, the youngest brother, who accepts role of primary HCDM and primary caregiver for patient.    Content/Action Overview:  Has NO ACP documents-Information provided. Patient is unable due to her cognitive limitations.  Reviewed DNR/DNI and patient elects Full Code (Attempt Resuscitation)    Palliative team consisting of MARTÍN and Dr. Kay met with patient, who was unable to participate in ACP discussion. (See Dr. Kay's note.) We then called her son Gianni, who confirmed that he will defer his role of primary HCDM brother Lorne.  After explaining legal nok hierarchy, which would make all 4 siblings equal HCDMs, we called patient's brother Vinicius and sister Rupa and they each deferred HCDM role to brother Lorne, who has been primary caregiver for patient since her stroke and has been keeping the siblings informed. The oldest brother, Bala has also had a stroke and is unable to participate in HCDM. Vinicius and Rupa want to be kept informed about patient's care and condition.      Length of Voluntary ACP Conversation in minutes:  <16 minutes (Non-Billable)    Lauryn Duckworth LCSW

## 2024-04-10 ENCOUNTER — APPOINTMENT (OUTPATIENT)
Facility: HOSPITAL | Age: 69
DRG: 853 | End: 2024-04-10
Attending: INTERNAL MEDICINE
Payer: COMMERCIAL

## 2024-04-10 ENCOUNTER — ANESTHESIA EVENT (OUTPATIENT)
Facility: HOSPITAL | Age: 69
End: 2024-04-10
Payer: MEDICARE

## 2024-04-10 ENCOUNTER — ANESTHESIA (OUTPATIENT)
Facility: HOSPITAL | Age: 69
End: 2024-04-10
Payer: MEDICARE

## 2024-04-10 LAB
ABO + RH BLD: NORMAL
ANION GAP SERPL CALC-SCNC: 6 MMOL/L (ref 5–15)
ANTIGENS PRESENT RBC DONR: NORMAL
BASOPHILS # BLD: 0 K/UL (ref 0–0.1)
BASOPHILS NFR BLD: 0 % (ref 0–1)
BLD PROD TYP BPU: NORMAL
BLOOD BANK BLOOD PRODUCT EXPIRATION DATE: NORMAL
BLOOD BANK BLOOD PRODUCT EXPIRATION DATE: NORMAL
BLOOD BANK DISPENSE STATUS: NORMAL
BLOOD BANK ISBT PRODUCT BLOOD TYPE: 6200
BLOOD BANK ISBT PRODUCT BLOOD TYPE: 9500
BLOOD BANK PRODUCT CODE: NORMAL
BLOOD BANK PRODUCT CODE: NORMAL
BLOOD BANK UNIT TYPE AND RH: NORMAL
BLOOD BANK UNIT TYPE AND RH: NORMAL
BLOOD GROUP ANTIBODIES SERPL: NORMAL
BLOOD GROUP ANTIBODIES SERPL: NORMAL
BPU ID: NORMAL
BUN SERPL-MCNC: 7 MG/DL (ref 6–20)
BUN/CREAT SERPL: 9 (ref 12–20)
CALCIUM SERPL-MCNC: 8.2 MG/DL (ref 8.5–10.1)
CHLORIDE SERPL-SCNC: 95 MMOL/L (ref 97–108)
CO2 SERPL-SCNC: 31 MMOL/L (ref 21–32)
COMMENT:: NORMAL
CREAT SERPL-MCNC: 0.76 MG/DL (ref 0.55–1.02)
CROSSMATCH RESULT: NORMAL
DAT POLY-SP REAG RBC QL: NORMAL
DIFFERENTIAL METHOD BLD: ABNORMAL
ECHO AO ASC DIAM: 3.6 CM
ECHO AO ASCENDING AORTA INDEX: 2.18 CM/M2
ECHO AO ROOT DIAM: 4.1 CM
ECHO AO ROOT INDEX: 2.48 CM/M2
ECHO AV AREA PEAK VELOCITY: 2.8 CM2
ECHO AV AREA/BSA PEAK VELOCITY: 1.7 CM2/M2
ECHO AV MEAN GRADIENT: 4 MMHG
ECHO AV MEAN VELOCITY: 0.9 M/S
ECHO AV PEAK GRADIENT: 7 MMHG
ECHO AV PEAK VELOCITY: 1.3 M/S
ECHO AV VELOCITY RATIO: 0.92
ECHO AV VTI: 28.3 CM
ECHO BSA: 1.66 M2
ECHO LA DIAMETER INDEX: 2.36 CM/M2
ECHO LA DIAMETER: 3.9 CM
ECHO LA TO AORTIC ROOT RATIO: 0.95
ECHO LV E' LATERAL VELOCITY: 11 CM/S
ECHO LV E' SEPTAL VELOCITY: 10 CM/S
ECHO LV FRACTIONAL SHORTENING: 27 % (ref 28–44)
ECHO LV INTERNAL DIMENSION DIASTOLE INDEX: 2.24 CM/M2
ECHO LV INTERNAL DIMENSION DIASTOLIC: 3.7 CM (ref 3.9–5.3)
ECHO LV INTERNAL DIMENSION SYSTOLIC INDEX: 1.64 CM/M2
ECHO LV INTERNAL DIMENSION SYSTOLIC: 2.7 CM
ECHO LV IVSD: 1 CM (ref 0.6–0.9)
ECHO LV MASS 2D: 112.5 G (ref 67–162)
ECHO LV MASS INDEX 2D: 68.2 G/M2 (ref 43–95)
ECHO LV POSTERIOR WALL DIASTOLIC: 1 CM (ref 0.6–0.9)
ECHO LV RELATIVE WALL THICKNESS RATIO: 0.54
ECHO LVOT AREA: 3.1 CM2
ECHO LVOT DIAM: 2 CM
ECHO LVOT PEAK GRADIENT: 6 MMHG
ECHO LVOT PEAK VELOCITY: 1.2 M/S
ECHO MV A VELOCITY: 0.83 M/S
ECHO MV E DECELERATION TIME (DT): 169.1 MS
ECHO MV E VELOCITY: 1.09 M/S
ECHO MV E/A RATIO: 1.31
ECHO MV E/E' LATERAL: 9.91
ECHO MV E/E' RATIO (AVERAGED): 10.4
ECHO PV MAX VELOCITY: 0.9 M/S
ECHO PV PEAK GRADIENT: 3 MMHG
ECHO RV FREE WALL PEAK S': 15 CM/S
ECHO RV TAPSE: 2.5 CM (ref 1.7–?)
EOSINOPHIL # BLD: 0 K/UL (ref 0–0.4)
EOSINOPHIL NFR BLD: 0 % (ref 0–7)
ERYTHROCYTE [DISTWIDTH] IN BLOOD BY AUTOMATED COUNT: 17 % (ref 11.5–14.5)
ERYTHROCYTE [DISTWIDTH] IN BLOOD BY AUTOMATED COUNT: 17.8 % (ref 11.5–14.5)
GLUCOSE SERPL-MCNC: 94 MG/DL (ref 65–100)
HCT VFR BLD AUTO: 23.4 % (ref 35–47)
HCT VFR BLD AUTO: 25.8 % (ref 35–47)
HGB BLD-MCNC: 7.7 G/DL (ref 11.5–16)
HGB BLD-MCNC: 8.5 G/DL (ref 11.5–16)
IMM GRANULOCYTES # BLD AUTO: 0 K/UL (ref 0–0.04)
IMM GRANULOCYTES NFR BLD AUTO: 0 % (ref 0–0.5)
LYMPHOCYTES # BLD: 2 K/UL (ref 0.8–3.5)
LYMPHOCYTES NFR BLD: 18 % (ref 12–49)
MAGNESIUM SERPL-MCNC: 1 MG/DL (ref 1.6–2.4)
MCH RBC QN AUTO: 28.5 PG (ref 26–34)
MCH RBC QN AUTO: 28.7 PG (ref 26–34)
MCHC RBC AUTO-ENTMCNC: 32.9 G/DL (ref 30–36.5)
MCHC RBC AUTO-ENTMCNC: 32.9 G/DL (ref 30–36.5)
MCV RBC AUTO: 86.6 FL (ref 80–99)
MCV RBC AUTO: 87.3 FL (ref 80–99)
METAMYELOCYTES NFR BLD MANUAL: 1 %
MONOCYTES # BLD: 0.9 K/UL (ref 0–1)
MONOCYTES NFR BLD: 8 % (ref 5–13)
NEUTS BAND NFR BLD MANUAL: 1 %
NEUTS SEG # BLD: 8 K/UL (ref 1.8–8)
NEUTS SEG NFR BLD: 72 % (ref 32–75)
NRBC # BLD: 0.02 K/UL (ref 0–0.01)
NRBC # BLD: 0.06 K/UL (ref 0–0.01)
NRBC BLD-RTO: 0.2 PER 100 WBC
NRBC BLD-RTO: 0.5 PER 100 WBC
PHOSPHATE SERPL-MCNC: 3.2 MG/DL (ref 2.6–4.7)
PLATELET # BLD AUTO: 276 K/UL (ref 150–400)
PLATELET # BLD AUTO: 296 K/UL (ref 150–400)
PMV BLD AUTO: 10.2 FL (ref 8.9–12.9)
PMV BLD AUTO: 10.2 FL (ref 8.9–12.9)
POTASSIUM SERPL-SCNC: 3.2 MMOL/L (ref 3.5–5.1)
RBC # BLD AUTO: 2.68 M/UL (ref 3.8–5.2)
RBC # BLD AUTO: 2.98 M/UL (ref 3.8–5.2)
RBC MORPH BLD: ABNORMAL
SODIUM SERPL-SCNC: 132 MMOL/L (ref 136–145)
SPECIMEN EXP DATE BLD: NORMAL
SPECIMEN HOLD: NORMAL
UNIT DIVISION: 0
UNIT ISSUE DATE/TIME: NORMAL
UNIT ISSUE DATE/TIME: NORMAL
VANCOMYCIN SERPL-MCNC: 20.8 UG/ML
WBC # BLD AUTO: 10.9 K/UL (ref 3.6–11)
WBC # BLD AUTO: 9.8 K/UL (ref 3.6–11)

## 2024-04-10 PROCEDURE — 85025 COMPLETE CBC W/AUTO DIFF WBC: CPT

## 2024-04-10 PROCEDURE — 3600007502: Performed by: SPECIALIST

## 2024-04-10 PROCEDURE — 6370000000 HC RX 637 (ALT 250 FOR IP): Performed by: INTERNAL MEDICINE

## 2024-04-10 PROCEDURE — 2709999900 HC NON-CHARGEABLE SUPPLY: Performed by: SPECIALIST

## 2024-04-10 PROCEDURE — 7100000010 HC PHASE II RECOVERY - FIRST 15 MIN: Performed by: SPECIALIST

## 2024-04-10 PROCEDURE — 2580000003 HC RX 258: Performed by: GENERAL ACUTE CARE HOSPITAL

## 2024-04-10 PROCEDURE — 6360000002 HC RX W HCPCS: Performed by: REGISTERED NURSE

## 2024-04-10 PROCEDURE — 2580000003 HC RX 258: Performed by: INTERNAL MEDICINE

## 2024-04-10 PROCEDURE — 6360000002 HC RX W HCPCS: Performed by: INTERNAL MEDICINE

## 2024-04-10 PROCEDURE — 94760 N-INVAS EAR/PLS OXIMETRY 1: CPT

## 2024-04-10 PROCEDURE — 3700000000 HC ANESTHESIA ATTENDED CARE: Performed by: SPECIALIST

## 2024-04-10 PROCEDURE — 0W3P8ZZ CONTROL BLEEDING IN GASTROINTESTINAL TRACT, VIA NATURAL OR ARTIFICIAL OPENING ENDOSCOPIC: ICD-10-PCS | Performed by: SPECIALIST

## 2024-04-10 PROCEDURE — 2700000000 HC OXYGEN THERAPY PER DAY

## 2024-04-10 PROCEDURE — 6360000004 HC RX CONTRAST MEDICATION: Performed by: GENERAL ACUTE CARE HOSPITAL

## 2024-04-10 PROCEDURE — 83735 ASSAY OF MAGNESIUM: CPT

## 2024-04-10 PROCEDURE — 36415 COLL VENOUS BLD VENIPUNCTURE: CPT

## 2024-04-10 PROCEDURE — 3600007512: Performed by: SPECIALIST

## 2024-04-10 PROCEDURE — 3700000001 HC ADD 15 MINUTES (ANESTHESIA): Performed by: SPECIALIST

## 2024-04-10 PROCEDURE — 6370000000 HC RX 637 (ALT 250 FOR IP): Performed by: PHYSICIAN ASSISTANT

## 2024-04-10 PROCEDURE — C1889 IMPLANT/INSERT DEVICE, NOC: HCPCS | Performed by: SPECIALIST

## 2024-04-10 PROCEDURE — 2060000000 HC ICU INTERMEDIATE R&B

## 2024-04-10 PROCEDURE — 2580000003 HC RX 258: Performed by: SPECIALIST

## 2024-04-10 PROCEDURE — 93308 TTE F-UP OR LMTD: CPT

## 2024-04-10 PROCEDURE — 6370000000 HC RX 637 (ALT 250 FOR IP): Performed by: GENERAL ACUTE CARE HOSPITAL

## 2024-04-10 PROCEDURE — 6360000002 HC RX W HCPCS: Performed by: GENERAL ACUTE CARE HOSPITAL

## 2024-04-10 PROCEDURE — 6370000000 HC RX 637 (ALT 250 FOR IP): Performed by: REGISTERED NURSE

## 2024-04-10 PROCEDURE — 80202 ASSAY OF VANCOMYCIN: CPT

## 2024-04-10 PROCEDURE — 7100000011 HC PHASE II RECOVERY - ADDTL 15 MIN: Performed by: SPECIALIST

## 2024-04-10 PROCEDURE — 84100 ASSAY OF PHOSPHORUS: CPT

## 2024-04-10 PROCEDURE — 80048 BASIC METABOLIC PNL TOTAL CA: CPT

## 2024-04-10 PROCEDURE — 87040 BLOOD CULTURE FOR BACTERIA: CPT

## 2024-04-10 PROCEDURE — 85027 COMPLETE CBC AUTOMATED: CPT

## 2024-04-10 PROCEDURE — 2500000003 HC RX 250 WO HCPCS: Performed by: REGISTERED NURSE

## 2024-04-10 DEVICE — CLIP
Type: IMPLANTABLE DEVICE | Site: OTHER | Status: FUNCTIONAL
Brand: RESOLUTION 360™ ULTRA CLIP

## 2024-04-10 DEVICE — WORKING LENGTH 235CM, WORKING CHANNEL 2.8MM
Type: IMPLANTABLE DEVICE | Site: OTHER | Status: FUNCTIONAL
Brand: RESOLUTION 360 CLIP

## 2024-04-10 RX ORDER — SODIUM CHLORIDE 9 MG/ML
25 INJECTION, SOLUTION INTRAVENOUS PRN
Status: DISCONTINUED | OUTPATIENT
Start: 2024-04-10 | End: 2024-04-10 | Stop reason: HOSPADM

## 2024-04-10 RX ORDER — SODIUM CHLORIDE 0.9 % (FLUSH) 0.9 %
5-40 SYRINGE (ML) INJECTION EVERY 12 HOURS SCHEDULED
Status: DISCONTINUED | OUTPATIENT
Start: 2024-04-10 | End: 2024-04-10 | Stop reason: HOSPADM

## 2024-04-10 RX ORDER — POTASSIUM CHLORIDE 750 MG/1
40 TABLET, FILM COATED, EXTENDED RELEASE ORAL ONCE
Status: COMPLETED | OUTPATIENT
Start: 2024-04-10 | End: 2024-04-10

## 2024-04-10 RX ORDER — POTASSIUM CHLORIDE 7.45 MG/ML
10 INJECTION INTRAVENOUS
Status: DISPENSED | OUTPATIENT
Start: 2024-04-10 | End: 2024-04-10

## 2024-04-10 RX ORDER — SODIUM CHLORIDE 0.9 % (FLUSH) 0.9 %
5-40 SYRINGE (ML) INJECTION PRN
Status: DISCONTINUED | OUTPATIENT
Start: 2024-04-10 | End: 2024-04-10 | Stop reason: HOSPADM

## 2024-04-10 RX ORDER — POTASSIUM CHLORIDE 750 MG/1
20 TABLET, FILM COATED, EXTENDED RELEASE ORAL DAILY
Status: DISCONTINUED | OUTPATIENT
Start: 2024-04-10 | End: 2024-05-03

## 2024-04-10 RX ORDER — LIDOCAINE HYDROCHLORIDE 20 MG/ML
INJECTION, SOLUTION EPIDURAL; INFILTRATION; INTRACAUDAL; PERINEURAL PRN
Status: DISCONTINUED | OUTPATIENT
Start: 2024-04-10 | End: 2024-04-10 | Stop reason: SDUPTHER

## 2024-04-10 RX ORDER — MAGNESIUM SULFATE IN WATER 40 MG/ML
2000 INJECTION, SOLUTION INTRAVENOUS ONCE
Status: COMPLETED | OUTPATIENT
Start: 2024-04-10 | End: 2024-04-10

## 2024-04-10 RX ADMIN — LIDOCAINE HYDROCHLORIDE 60 MG: 20 INJECTION, SOLUTION EPIDURAL; INFILTRATION; INTRACAUDAL; PERINEURAL at 14:33

## 2024-04-10 RX ADMIN — PROPOFOL 10 MG: 10 INJECTION, EMULSION INTRAVENOUS at 14:35

## 2024-04-10 RX ADMIN — SODIUM CHLORIDE, PRESERVATIVE FREE 10 ML: 5 INJECTION INTRAVENOUS at 21:06

## 2024-04-10 RX ADMIN — PROPOFOL 10 MG: 10 INJECTION, EMULSION INTRAVENOUS at 14:41

## 2024-04-10 RX ADMIN — FUROSEMIDE 20 MG: 10 INJECTION, SOLUTION INTRAMUSCULAR; INTRAVENOUS at 08:18

## 2024-04-10 RX ADMIN — POTASSIUM CHLORIDE 10 MEQ: 7.46 INJECTION, SOLUTION INTRAVENOUS at 12:30

## 2024-04-10 RX ADMIN — DIVALPROEX SODIUM 250 MG: 250 TABLET, DELAYED RELEASE ORAL at 08:18

## 2024-04-10 RX ADMIN — MAGNESIUM SULFATE HEPTAHYDRATE 2000 MG: 40 INJECTION, SOLUTION INTRAVENOUS at 10:33

## 2024-04-10 RX ADMIN — VANCOMYCIN HYDROCHLORIDE 750 MG: 750 INJECTION, POWDER, LYOPHILIZED, FOR SOLUTION INTRAVENOUS at 16:45

## 2024-04-10 RX ADMIN — PROPOFOL 10 MG: 10 INJECTION, EMULSION INTRAVENOUS at 14:40

## 2024-04-10 RX ADMIN — ATORVASTATIN CALCIUM 20 MG: 20 TABLET, FILM COATED ORAL at 08:18

## 2024-04-10 RX ADMIN — FUROSEMIDE 20 MG: 10 INJECTION, SOLUTION INTRAMUSCULAR; INTRAVENOUS at 17:35

## 2024-04-10 RX ADMIN — DIAZEPAM 1 MG: 2 TABLET ORAL at 21:04

## 2024-04-10 RX ADMIN — PIPERACILLIN AND TAZOBACTAM 3375 MG: 3; .375 INJECTION, POWDER, LYOPHILIZED, FOR SOLUTION INTRAVENOUS at 21:22

## 2024-04-10 RX ADMIN — PANTOPRAZOLE SODIUM 40 MG: 40 TABLET, DELAYED RELEASE ORAL at 16:45

## 2024-04-10 RX ADMIN — MIRTAZAPINE 15 MG: 15 TABLET, FILM COATED ORAL at 21:05

## 2024-04-10 RX ADMIN — PANTOPRAZOLE SODIUM 40 MG: 40 TABLET, DELAYED RELEASE ORAL at 08:18

## 2024-04-10 RX ADMIN — PERFLUTREN 1.5 ML: 6.52 INJECTION, SUSPENSION INTRAVENOUS at 11:49

## 2024-04-10 RX ADMIN — DIVALPROEX SODIUM 250 MG: 250 TABLET, DELAYED RELEASE ORAL at 21:05

## 2024-04-10 RX ADMIN — PROPOFOL 10 MG: 10 INJECTION, EMULSION INTRAVENOUS at 14:37

## 2024-04-10 RX ADMIN — BENZOCAINE 1 EACH: 200 SPRAY DENTAL; ORAL; PERIODONTAL at 14:11

## 2024-04-10 RX ADMIN — POTASSIUM CHLORIDE 20 MEQ: 750 TABLET, FILM COATED, EXTENDED RELEASE ORAL at 16:47

## 2024-04-10 RX ADMIN — PROPOFOL 10 MG: 10 INJECTION, EMULSION INTRAVENOUS at 14:39

## 2024-04-10 RX ADMIN — VANCOMYCIN HYDROCHLORIDE 1000 MG: 1 INJECTION, POWDER, LYOPHILIZED, FOR SOLUTION INTRAVENOUS at 02:50

## 2024-04-10 RX ADMIN — ASPIRIN 81 MG: 81 TABLET, COATED ORAL at 08:18

## 2024-04-10 RX ADMIN — POTASSIUM CHLORIDE 40 MEQ: 750 TABLET, FILM COATED, EXTENDED RELEASE ORAL at 18:22

## 2024-04-10 RX ADMIN — PIPERACILLIN AND TAZOBACTAM 3375 MG: 3; .375 INJECTION, POWDER, LYOPHILIZED, FOR SOLUTION INTRAVENOUS at 04:06

## 2024-04-10 RX ADMIN — SODIUM CHLORIDE 25 ML: 9 INJECTION, SOLUTION INTRAVENOUS at 13:46

## 2024-04-10 RX ADMIN — SODIUM CHLORIDE, PRESERVATIVE FREE 10 ML: 5 INJECTION INTRAVENOUS at 08:19

## 2024-04-10 RX ADMIN — PROPOFOL 20 MG: 10 INJECTION, EMULSION INTRAVENOUS at 14:33

## 2024-04-10 RX ADMIN — PROPOFOL 10 MG: 10 INJECTION, EMULSION INTRAVENOUS at 14:43

## 2024-04-10 ASSESSMENT — PAIN SCALES - GENERAL
PAINLEVEL_OUTOF10: 0
PAINLEVEL_OUTOF10: 0

## 2024-04-10 NOTE — ANESTHESIA PRE PROCEDURE
650 mg Oral Q6H PRN Ailyn Mcpherson MD   650 mg at 04/09/24 0149    Or   • acetaminophen (TYLENOL) suppository 650 mg  650 mg Rectal Q6H PRN Ailyn Mcpherson MD       • Nuedexta - Please identify and contact pharmacy when patient's home supply has arrived - thank you   Other RX Placeholder Ailyn Mcpherson MD       • [Held by provider] metoprolol tartrate (LOPRESSOR) tablet 12.5 mg  12.5 mg Oral BID Ailyn Mcpherson MD   12.5 mg at 04/08/24 2050       Allergies:  No Known Allergies    Problem List:    Patient Active Problem List   Diagnosis Code   • Sepsis (HCC) A41.9   • Palliative care encounter Z51.5   • DNR (do not resuscitate) discussion Z71.89   • Impaired cognitive ability R41.89   • Generalized weakness R53.1   • Decreased oral intake R63.8       Past Medical History:        Diagnosis Date   • Anxiety    • Bipolar 1 disorder (HCC)    • [facility]    • Hypertension        Past Surgical History:  History reviewed. No pertinent surgical history.    Social History:    Social History     Tobacco Use   • Smoking status: Every Day   • Smokeless tobacco: Not on file   Substance Use Topics   • Alcohol use: Yes                                Ready to quit: Not Answered  Counseling given: Not Answered      Vital Signs (Current):   Vitals:    04/09/24 2230 04/10/24 0246 04/10/24 0600 04/10/24 0722   BP: (!) 146/86 120/64  122/71   Pulse: (!) 106 (!) 102  91   Resp: (!) 37 (!) 40  27   Temp: 98.1 °F (36.7 °C) 98.4 °F (36.9 °C)  100.1 °F (37.8 °C)   TempSrc: Oral Oral  Oral   SpO2: 95% 97%  97%   Weight:   62.1 kg (136 lb 14.5 oz)    Height:                                                  BP Readings from Last 3 Encounters:   04/10/24 122/71       NPO Status:                                                                                 BMI:   Wt Readings from Last 3 Encounters:   04/10/24 62.1 kg (136 lb 14.5 oz)     Body mass index is 24.26 kg/m².    CBC:   Lab Results   Component Value

## 2024-04-10 NOTE — OP NOTE
Esophagogastroduodenoscopy Procedure Note      Lillie Claire  1955  [id number]    Indication: Melena/hematochezia     Endoscopist: Maximiliano Alberts MD    Referring Provider:  Husam Foster APRN - NP    Sedation:  MAC anesthesia Propofol    Procedure Details:  After infomed consent was obtained for the procedure, with all risks and benefits of procedure explained the patient was taken to the endoscopy suite and placed in the left lateral decubitus position.  Following sequential administration of sedation as per above, the endoscope was inserted into the mouth and advanced under direct vision to second portion of the duodenum.  A careful inspection was made as the gastroscope was withdrawn, including a retroflexed view of the proximal stomach; findings and interventions are described below.      Findings:     Esophagus:   - Normal esophagus in the proximal, mid and distal esophagus.    Stomach:   + Normal gastric mucosa.  There was no blood in stomach. No AVMs.    Duodenum:   ++ (2) nonbleeding small AVMs in the posterior duodenal bulb s/p APC cauterization.  + In the duodenal 2nd portion there was bleeding and irrigation revealed active bleeding from an AVM on the medial wall.  Lavaging revealed persistent bleeding.  We placed (2) regular sized EndoClips but still some oozing and we added a larger Ultra CLIP and this in addition to further APC to the site helped stop any further bleeding.  Scope was advanced to the junction of 2nd/3rd portion duodenum.    Therapies:  see above    Specimen: none            Complications:   None were encountered during the procedure.    EBL: < 20 ml.          Recommendations:   -Follow H/H  -for persistent drop in Hgb and passage of melena consider colonoscopy  -spoke w daughter Cely    Maximiliano Alberts MD  4/10/2024  2:49 PM

## 2024-04-10 NOTE — ANESTHESIA POSTPROCEDURE EVALUATION
Department of Anesthesiology  Postprocedure Note    Patient: Lillie Claire  MRN: [id number]  YOB: 1955  Date of evaluation: 4/10/2024    Procedure Summary       Date: 04/10/24 Room / Location: [facility] 01 / [facility] ENDOSCOPY    Anesthesia Start: 1405 Anesthesia Stop: 1449    Procedure: ESOPHAGOGASTRODUODENOSCOPY Diagnosis:       Melena      (Melena [K92.1])    Surgeons: Maximiliano Alberts MD Responsible Provider: Xiomy Liu MD    Anesthesia Type: MAC ASA Status: 3            Anesthesia Type: MAC    Nagi Phase I: Nagi Score: 7    Nagi Phase II: Nagi Score: 10    Anesthesia Post Evaluation    Patient location during evaluation: bedside  Patient participation: complete - patient participated  Level of consciousness: awake and alert  Pain scale: Controlled per protocol.  Airway patency: patent  Nausea & Vomiting: no nausea and no vomiting  Cardiovascular status: hemodynamically stable  Respiratory status: acceptable  Hydration status: stable  Multimodal analgesia pain management approach  Pain management: adequate    No notable events documented.

## 2024-04-11 ENCOUNTER — APPOINTMENT (OUTPATIENT)
Facility: HOSPITAL | Age: 69
DRG: 853 | End: 2024-04-11
Payer: COMMERCIAL

## 2024-04-11 ENCOUNTER — ANESTHESIA EVENT (OUTPATIENT)
Facility: HOSPITAL | Age: 69
DRG: 853 | End: 2024-04-11
Payer: COMMERCIAL

## 2024-04-11 ENCOUNTER — ANESTHESIA (OUTPATIENT)
Facility: HOSPITAL | Age: 69
End: 2024-04-11
Payer: MEDICARE

## 2024-04-11 ENCOUNTER — ANESTHESIA EVENT (OUTPATIENT)
Facility: HOSPITAL | Age: 69
End: 2024-04-11
Payer: MEDICARE

## 2024-04-11 ENCOUNTER — ANESTHESIA (OUTPATIENT)
Facility: HOSPITAL | Age: 69
DRG: 853 | End: 2024-04-11
Payer: COMMERCIAL

## 2024-04-11 LAB
CRP SERPL-MCNC: 21.6 MG/DL (ref 0–0.3)
ERYTHROCYTE [DISTWIDTH] IN BLOOD BY AUTOMATED COUNT: 18.6 % (ref 11.5–14.5)
ERYTHROCYTE [SEDIMENTATION RATE] IN BLOOD: 128 MM/HR (ref 0–30)
HCT VFR BLD AUTO: 26.6 % (ref 35–47)
HGB BLD-MCNC: 8.4 G/DL (ref 11.5–16)
HIV 1+2 AB+HIV1 P24 AG SERPL QL IA: NONREACTIVE
HIV 1/2 RESULT COMMENT: NORMAL
MAGNESIUM SERPL-MCNC: 1.6 MG/DL (ref 1.6–2.4)
MCH RBC QN AUTO: 28.5 PG (ref 26–34)
MCHC RBC AUTO-ENTMCNC: 31.6 G/DL (ref 30–36.5)
MCV RBC AUTO: 90.2 FL (ref 80–99)
NRBC # BLD: 0 K/UL (ref 0–0.01)
NRBC BLD-RTO: 0 PER 100 WBC
PLATELET # BLD AUTO: 346 K/UL (ref 150–400)
PMV BLD AUTO: 10.4 FL (ref 8.9–12.9)
RBC # BLD AUTO: 2.95 M/UL (ref 3.8–5.2)
RHEUMATOID FACT SERPL-ACNC: <10 IU/ML
WBC # BLD AUTO: 10.7 K/UL (ref 3.6–11)

## 2024-04-11 PROCEDURE — 85027 COMPLETE CBC AUTOMATED: CPT

## 2024-04-11 PROCEDURE — 71045 X-RAY EXAM CHEST 1 VIEW: CPT

## 2024-04-11 PROCEDURE — 80074 ACUTE HEPATITIS PANEL: CPT

## 2024-04-11 PROCEDURE — 87389 HIV-1 AG W/HIV-1&-2 AB AG IA: CPT

## 2024-04-11 PROCEDURE — 02HV33Z INSERTION OF INFUSION DEVICE INTO SUPERIOR VENA CAVA, PERCUTANEOUS APPROACH: ICD-10-PCS | Performed by: STUDENT IN AN ORGANIZED HEALTH CARE EDUCATION/TRAINING PROGRAM

## 2024-04-11 PROCEDURE — 83735 ASSAY OF MAGNESIUM: CPT

## 2024-04-11 PROCEDURE — 2580000003 HC RX 258: Performed by: GENERAL ACUTE CARE HOSPITAL

## 2024-04-11 PROCEDURE — 2580000003 HC RX 258: Performed by: INTERNAL MEDICINE

## 2024-04-11 PROCEDURE — 87449 NOS EACH ORGANISM AG IA: CPT

## 2024-04-11 PROCEDURE — 2700000000 HC OXYGEN THERAPY PER DAY

## 2024-04-11 PROCEDURE — 6370000000 HC RX 637 (ALT 250 FOR IP): Performed by: PHYSICIAN ASSISTANT

## 2024-04-11 PROCEDURE — 87040 BLOOD CULTURE FOR BACTERIA: CPT

## 2024-04-11 PROCEDURE — 6360000002 HC RX W HCPCS: Performed by: INTERNAL MEDICINE

## 2024-04-11 PROCEDURE — 6360000002 HC RX W HCPCS: Performed by: GENERAL ACUTE CARE HOSPITAL

## 2024-04-11 PROCEDURE — 86140 C-REACTIVE PROTEIN: CPT

## 2024-04-11 PROCEDURE — 2060000000 HC ICU INTERMEDIATE R&B

## 2024-04-11 PROCEDURE — 6370000000 HC RX 637 (ALT 250 FOR IP): Performed by: GENERAL ACUTE CARE HOSPITAL

## 2024-04-11 PROCEDURE — 99223 1ST HOSP IP/OBS HIGH 75: CPT | Performed by: INTERNAL MEDICINE

## 2024-04-11 PROCEDURE — 86038 ANTINUCLEAR ANTIBODIES: CPT

## 2024-04-11 PROCEDURE — 85652 RBC SED RATE AUTOMATED: CPT

## 2024-04-11 PROCEDURE — 6370000000 HC RX 637 (ALT 250 FOR IP): Performed by: INTERNAL MEDICINE

## 2024-04-11 PROCEDURE — 36556 INSERT NON-TUNNEL CV CATH: CPT | Performed by: STUDENT IN AN ORGANIZED HEALTH CARE EDUCATION/TRAINING PROGRAM

## 2024-04-11 PROCEDURE — 36415 COLL VENOUS BLD VENIPUNCTURE: CPT

## 2024-04-11 PROCEDURE — 86431 RHEUMATOID FACTOR QUANT: CPT

## 2024-04-11 PROCEDURE — B548ZZA ULTRASONOGRAPHY OF SUPERIOR VENA CAVA, GUIDANCE: ICD-10-PCS | Performed by: STUDENT IN AN ORGANIZED HEALTH CARE EDUCATION/TRAINING PROGRAM

## 2024-04-11 RX ADMIN — SENNOSIDES AND DOCUSATE SODIUM 1 TABLET: 50; 8.6 TABLET ORAL at 09:43

## 2024-04-11 RX ADMIN — PIPERACILLIN AND TAZOBACTAM 3375 MG: 3; .375 INJECTION, POWDER, LYOPHILIZED, FOR SOLUTION INTRAVENOUS at 20:57

## 2024-04-11 RX ADMIN — ATORVASTATIN CALCIUM 20 MG: 20 TABLET, FILM COATED ORAL at 09:43

## 2024-04-11 RX ADMIN — MIRTAZAPINE 15 MG: 15 TABLET, FILM COATED ORAL at 20:55

## 2024-04-11 RX ADMIN — VANCOMYCIN HYDROCHLORIDE 750 MG: 750 INJECTION, POWDER, LYOPHILIZED, FOR SOLUTION INTRAVENOUS at 04:38

## 2024-04-11 RX ADMIN — PIPERACILLIN AND TAZOBACTAM 3375 MG: 3; .375 INJECTION, POWDER, LYOPHILIZED, FOR SOLUTION INTRAVENOUS at 05:56

## 2024-04-11 RX ADMIN — PANTOPRAZOLE SODIUM 40 MG: 40 TABLET, DELAYED RELEASE ORAL at 06:16

## 2024-04-11 RX ADMIN — PIPERACILLIN AND TAZOBACTAM 3375 MG: 3; .375 INJECTION, POWDER, LYOPHILIZED, FOR SOLUTION INTRAVENOUS at 13:16

## 2024-04-11 RX ADMIN — SODIUM CHLORIDE: 9 INJECTION, SOLUTION INTRAVENOUS at 13:16

## 2024-04-11 RX ADMIN — ACETAMINOPHEN 650 MG: 325 TABLET ORAL at 00:34

## 2024-04-11 RX ADMIN — SODIUM CHLORIDE, PRESERVATIVE FREE 10 ML: 5 INJECTION INTRAVENOUS at 20:55

## 2024-04-11 RX ADMIN — DIVALPROEX SODIUM 250 MG: 250 TABLET, DELAYED RELEASE ORAL at 09:43

## 2024-04-11 RX ADMIN — FUROSEMIDE 20 MG: 10 INJECTION, SOLUTION INTRAMUSCULAR; INTRAVENOUS at 09:43

## 2024-04-11 RX ADMIN — DIVALPROEX SODIUM 250 MG: 250 TABLET, DELAYED RELEASE ORAL at 20:55

## 2024-04-11 RX ADMIN — ASPIRIN 81 MG: 81 TABLET, COATED ORAL at 09:43

## 2024-04-11 RX ADMIN — POTASSIUM CHLORIDE 20 MEQ: 750 TABLET, FILM COATED, EXTENDED RELEASE ORAL at 09:43

## 2024-04-11 RX ADMIN — DIAZEPAM 1 MG: 2 TABLET ORAL at 20:55

## 2024-04-11 RX ADMIN — PANTOPRAZOLE SODIUM 40 MG: 40 TABLET, DELAYED RELEASE ORAL at 18:40

## 2024-04-11 RX ADMIN — SODIUM CHLORIDE, PRESERVATIVE FREE 10 ML: 5 INJECTION INTRAVENOUS at 10:12

## 2024-04-11 ASSESSMENT — PAIN SCALES - GENERAL: PAINLEVEL_OUTOF10: 0

## 2024-04-11 NOTE — ANESTHESIA PROCEDURE NOTES
Central Venous Line:    A central venous line was placed using ultrasound guidance, in the holding area for the following indication(s): central venous access and CVP monitoring.4/11/2024 5:59 PM4/11/2024 6:15 PM    Sterility preparation included the following: provider used sterile gloves, gown, hat and mask and maximum sterile barriers used during central venous catheter insertion.    The patient was placed in Trendelenburg position.The right internal jugular vein was prepped.    The site was prepped with Chloraprep.  A 7 Fr (size), 16 (length), introducer triple lumen was placed.    During the procedure, the following specific steps were taken: target vein identified, needle advanced into vein and blood aspirated and guidewire advanced into vein.    Intravenous verification was obtained by ultrasound, venous blood return and x-ray.    Post insertion care included: all ports aspirated, all ports flushed easily, guidewire removed intact, Biopatch applied, line sutured in place and dressing applied.    During the procedure the patient experienced: patient tolerated procedure well with no complications.      Outcomes: uncomplicated and patient tolerated procedure well  Anesthesia type: local..No  Staffing  Performed: Anesthesiologist   Anesthesiologist: Sanford Watson MD  Performed by: Sanford Watson MD  Authorized by: Sanford Watson MD    Preanesthetic Checklist  Completed: patient identified, timeout performed and monitors applied/VS acknowledged

## 2024-04-12 ENCOUNTER — APPOINTMENT (OUTPATIENT)
Facility: HOSPITAL | Age: 69
DRG: 853 | End: 2024-04-12
Payer: COMMERCIAL

## 2024-04-12 LAB
-: ABNORMAL
ANA SER QL: NEGATIVE
APPEARANCE FLD: CLEAR
BASOPHILS # BLD: 0 K/UL (ref 0–0.1)
BASOPHILS NFR BLD: 0 % (ref 0–1)
COLOR FLD: YELLOW
DIFFERENTIAL METHOD BLD: ABNORMAL
EOSINOPHIL # BLD: 0 K/UL (ref 0–0.4)
EOSINOPHIL NFR BLD: 0 % (ref 0–7)
ERYTHROCYTE [DISTWIDTH] IN BLOOD BY AUTOMATED COUNT: 18.4 % (ref 11.5–14.5)
ERYTHROCYTE [DISTWIDTH] IN BLOOD BY AUTOMATED COUNT: 18.6 % (ref 11.5–14.5)
ERYTHROCYTE [DISTWIDTH] IN BLOOD BY AUTOMATED COUNT: 18.7 % (ref 11.5–14.5)
HAV IGM SER QL: NONREACTIVE
HBV CORE IGM SER QL: NONREACTIVE
HBV SURFACE AG SER QL: 0.41 INDEX
HBV SURFACE AG SER QL: NEGATIVE
HCT VFR BLD AUTO: 22.2 % (ref 35–47)
HCT VFR BLD AUTO: 22.5 % (ref 35–47)
HCT VFR BLD AUTO: 23.3 % (ref 35–47)
HCT VFR BLD AUTO: 24.9 % (ref 35–47)
HCV AB SER IA-ACNC: >11 INDEX
HCV AB SERPL QL IA: REACTIVE
HGB BLD-MCNC: 7.1 G/DL (ref 11.5–16)
HGB BLD-MCNC: 7.3 G/DL (ref 11.5–16)
HGB BLD-MCNC: 7.4 G/DL (ref 11.5–16)
HGB BLD-MCNC: 8.2 G/DL (ref 11.5–16)
IMM GRANULOCYTES # BLD AUTO: 0.2 K/UL (ref 0–0.04)
IMM GRANULOCYTES NFR BLD AUTO: 2 % (ref 0–0.5)
LYMPHOCYTES # BLD: 1.7 K/UL (ref 0.8–3.5)
LYMPHOCYTES NFR BLD: 16 % (ref 12–49)
LYMPHOCYTES NFR FLD: 58 %
MAGNESIUM SERPL-MCNC: 1.4 MG/DL (ref 1.6–2.4)
MCH RBC QN AUTO: 28.4 PG (ref 26–34)
MCH RBC QN AUTO: 28.5 PG (ref 26–34)
MCH RBC QN AUTO: 28.9 PG (ref 26–34)
MCHC RBC AUTO-ENTMCNC: 31.8 G/DL (ref 30–36.5)
MCHC RBC AUTO-ENTMCNC: 32 G/DL (ref 30–36.5)
MCHC RBC AUTO-ENTMCNC: 32.4 G/DL (ref 30–36.5)
MCV RBC AUTO: 88.9 FL (ref 80–99)
MCV RBC AUTO: 89.2 FL (ref 80–99)
MCV RBC AUTO: 89.3 FL (ref 80–99)
MESOTHL CELL NFR FLD: 8 %
MONOCYTES # BLD: 2.2 K/UL (ref 0–1)
MONOCYTES NFR BLD: 21 % (ref 5–13)
MONOS+MACROS NFR FLD: 31 %
NEUTROPHILS NFR FLD: 3 %
NEUTS SEG # BLD: 6.3 K/UL (ref 1.8–8)
NEUTS SEG NFR BLD: 61 % (ref 32–75)
NRBC # BLD: 0 K/UL (ref 0–0.01)
NRBC # BLD: 0 K/UL (ref 0–0.01)
NRBC # BLD: 0.02 K/UL (ref 0–0.01)
NRBC BLD-RTO: 0 PER 100 WBC
NRBC BLD-RTO: 0 PER 100 WBC
NRBC BLD-RTO: 0.2 PER 100 WBC
NUC CELL # FLD: 915 /CU MM
PLATELET # BLD AUTO: 325 K/UL (ref 150–400)
PLATELET # BLD AUTO: 333 K/UL (ref 150–400)
PLATELET # BLD AUTO: 345 K/UL (ref 150–400)
PMV BLD AUTO: 10 FL (ref 8.9–12.9)
PMV BLD AUTO: 10.2 FL (ref 8.9–12.9)
PMV BLD AUTO: 10.4 FL (ref 8.9–12.9)
RBC # BLD AUTO: 2.49 M/UL (ref 3.8–5.2)
RBC # BLD AUTO: 2.53 M/UL (ref 3.8–5.2)
RBC # BLD AUTO: 2.61 M/UL (ref 3.8–5.2)
RBC # FLD: 85 /CU MM
RBC MORPH BLD: ABNORMAL
SPECIMEN SOURCE FLD: ABNORMAL
VANCOMYCIN SERPL-MCNC: 8.4 UG/ML
WBC # BLD AUTO: 10.4 K/UL (ref 3.6–11)
WBC # BLD AUTO: 11.2 K/UL (ref 3.6–11)
WBC # BLD AUTO: 9.9 K/UL (ref 3.6–11)

## 2024-04-12 PROCEDURE — 2700000000 HC OXYGEN THERAPY PER DAY

## 2024-04-12 PROCEDURE — 85018 HEMOGLOBIN: CPT

## 2024-04-12 PROCEDURE — 36415 COLL VENOUS BLD VENIPUNCTURE: CPT

## 2024-04-12 PROCEDURE — 85025 COMPLETE CBC W/AUTO DIFF WBC: CPT

## 2024-04-12 PROCEDURE — 83735 ASSAY OF MAGNESIUM: CPT

## 2024-04-12 PROCEDURE — 6360000002 HC RX W HCPCS: Performed by: INTERNAL MEDICINE

## 2024-04-12 PROCEDURE — 87070 CULTURE OTHR SPECIMN AEROBIC: CPT

## 2024-04-12 PROCEDURE — 87522 HEPATITIS C REVRS TRNSCRPJ: CPT

## 2024-04-12 PROCEDURE — 71045 X-RAY EXAM CHEST 1 VIEW: CPT

## 2024-04-12 PROCEDURE — 2709999900 US THORACENTESIS

## 2024-04-12 PROCEDURE — 2500000003 HC RX 250 WO HCPCS: Performed by: STUDENT IN AN ORGANIZED HEALTH CARE EDUCATION/TRAINING PROGRAM

## 2024-04-12 PROCEDURE — 87205 SMEAR GRAM STAIN: CPT

## 2024-04-12 PROCEDURE — 80202 ASSAY OF VANCOMYCIN: CPT

## 2024-04-12 PROCEDURE — 2580000003 HC RX 258: Performed by: INTERNAL MEDICINE

## 2024-04-12 PROCEDURE — 99233 SBSQ HOSP IP/OBS HIGH 50: CPT | Performed by: INTERNAL MEDICINE

## 2024-04-12 PROCEDURE — 84157 ASSAY OF PROTEIN OTHER: CPT

## 2024-04-12 PROCEDURE — 97530 THERAPEUTIC ACTIVITIES: CPT

## 2024-04-12 PROCEDURE — 89050 BODY FLUID CELL COUNT: CPT

## 2024-04-12 PROCEDURE — 85027 COMPLETE CBC AUTOMATED: CPT

## 2024-04-12 PROCEDURE — 6370000000 HC RX 637 (ALT 250 FOR IP): Performed by: PHYSICIAN ASSISTANT

## 2024-04-12 PROCEDURE — 2580000003 HC RX 258: Performed by: GENERAL ACUTE CARE HOSPITAL

## 2024-04-12 PROCEDURE — 88112 CYTOPATH CELL ENHANCE TECH: CPT

## 2024-04-12 PROCEDURE — 0W993ZZ DRAINAGE OF RIGHT PLEURAL CAVITY, PERCUTANEOUS APPROACH: ICD-10-PCS | Performed by: STUDENT IN AN ORGANIZED HEALTH CARE EDUCATION/TRAINING PROGRAM

## 2024-04-12 PROCEDURE — 32555 ASPIRATE PLEURA W/ IMAGING: CPT

## 2024-04-12 PROCEDURE — 6370000000 HC RX 637 (ALT 250 FOR IP): Performed by: INTERNAL MEDICINE

## 2024-04-12 PROCEDURE — 87075 CULTR BACTERIA EXCEPT BLOOD: CPT

## 2024-04-12 PROCEDURE — 6370000000 HC RX 637 (ALT 250 FOR IP): Performed by: GENERAL ACUTE CARE HOSPITAL

## 2024-04-12 PROCEDURE — 6360000002 HC RX W HCPCS: Performed by: GENERAL ACUTE CARE HOSPITAL

## 2024-04-12 PROCEDURE — 2060000000 HC ICU INTERMEDIATE R&B

## 2024-04-12 PROCEDURE — 88305 TISSUE EXAM BY PATHOLOGIST: CPT

## 2024-04-12 PROCEDURE — 85014 HEMATOCRIT: CPT

## 2024-04-12 PROCEDURE — 83615 LACTATE (LD) (LDH) ENZYME: CPT

## 2024-04-12 RX ORDER — LIDOCAINE HYDROCHLORIDE 10 MG/ML
10 INJECTION, SOLUTION EPIDURAL; INFILTRATION; INTRACAUDAL; PERINEURAL ONCE
Status: COMPLETED | OUTPATIENT
Start: 2024-04-12 | End: 2024-04-12

## 2024-04-12 RX ORDER — MAGNESIUM SULFATE 1 G/100ML
1000 INJECTION INTRAVENOUS ONCE
Status: COMPLETED | OUTPATIENT
Start: 2024-04-12 | End: 2024-04-12

## 2024-04-12 RX ADMIN — LIDOCAINE HYDROCHLORIDE 10 ML: 10 INJECTION, SOLUTION EPIDURAL; INFILTRATION; INTRACAUDAL; PERINEURAL at 15:01

## 2024-04-12 RX ADMIN — ATORVASTATIN CALCIUM 20 MG: 20 TABLET, FILM COATED ORAL at 10:59

## 2024-04-12 RX ADMIN — FUROSEMIDE 20 MG: 10 INJECTION, SOLUTION INTRAMUSCULAR; INTRAVENOUS at 17:03

## 2024-04-12 RX ADMIN — VANCOMYCIN HYDROCHLORIDE 750 MG: 750 INJECTION, POWDER, LYOPHILIZED, FOR SOLUTION INTRAVENOUS at 04:02

## 2024-04-12 RX ADMIN — POTASSIUM CHLORIDE 20 MEQ: 750 TABLET, FILM COATED, EXTENDED RELEASE ORAL at 10:58

## 2024-04-12 RX ADMIN — ACETAMINOPHEN 650 MG: 325 TABLET ORAL at 09:40

## 2024-04-12 RX ADMIN — DIAZEPAM 1 MG: 2 TABLET ORAL at 20:08

## 2024-04-12 RX ADMIN — DIVALPROEX SODIUM 250 MG: 250 TABLET, DELAYED RELEASE ORAL at 10:58

## 2024-04-12 RX ADMIN — FUROSEMIDE 20 MG: 10 INJECTION, SOLUTION INTRAMUSCULAR; INTRAVENOUS at 10:59

## 2024-04-12 RX ADMIN — SODIUM CHLORIDE, PRESERVATIVE FREE 10 ML: 5 INJECTION INTRAVENOUS at 20:09

## 2024-04-12 RX ADMIN — ASPIRIN 81 MG: 81 TABLET, COATED ORAL at 10:58

## 2024-04-12 RX ADMIN — MIRTAZAPINE 15 MG: 15 TABLET, FILM COATED ORAL at 20:08

## 2024-04-12 RX ADMIN — PIPERACILLIN AND TAZOBACTAM 3375 MG: 3; .375 INJECTION, POWDER, LYOPHILIZED, FOR SOLUTION INTRAVENOUS at 20:22

## 2024-04-12 RX ADMIN — DIVALPROEX SODIUM 250 MG: 250 TABLET, DELAYED RELEASE ORAL at 21:04

## 2024-04-12 RX ADMIN — PANTOPRAZOLE SODIUM 40 MG: 40 TABLET, DELAYED RELEASE ORAL at 16:41

## 2024-04-12 RX ADMIN — PIPERACILLIN AND TAZOBACTAM 3375 MG: 3; .375 INJECTION, POWDER, LYOPHILIZED, FOR SOLUTION INTRAVENOUS at 04:00

## 2024-04-12 RX ADMIN — MAGNESIUM SULFATE HEPTAHYDRATE 1000 MG: 1 INJECTION, SOLUTION INTRAVENOUS at 18:41

## 2024-04-12 RX ADMIN — PIPERACILLIN AND TAZOBACTAM 3375 MG: 3; .375 INJECTION, POWDER, LYOPHILIZED, FOR SOLUTION INTRAVENOUS at 12:45

## 2024-04-12 RX ADMIN — VANCOMYCIN HYDROCHLORIDE 750 MG: 750 INJECTION, POWDER, LYOPHILIZED, FOR SOLUTION INTRAVENOUS at 16:59

## 2024-04-12 RX ADMIN — ACETAMINOPHEN 650 MG: 325 TABLET ORAL at 20:08

## 2024-04-12 RX ADMIN — SODIUM CHLORIDE, PRESERVATIVE FREE 10 ML: 5 INJECTION INTRAVENOUS at 11:14

## 2024-04-12 ASSESSMENT — PAIN SCALES - GENERAL
PAINLEVEL_OUTOF10: 0
PAINLEVEL_OUTOF10: 0

## 2024-04-13 LAB
ERYTHROCYTE [DISTWIDTH] IN BLOOD BY AUTOMATED COUNT: 18.6 % (ref 11.5–14.5)
ERYTHROCYTE [DISTWIDTH] IN BLOOD BY AUTOMATED COUNT: 18.7 % (ref 11.5–14.5)
HCT VFR BLD AUTO: 22.4 % (ref 35–47)
HCT VFR BLD AUTO: 23.9 % (ref 35–47)
HGB BLD-MCNC: 7.2 G/DL (ref 11.5–16)
HGB BLD-MCNC: 7.4 G/DL (ref 11.5–16)
LDH FLD L TO P-CCNC: 167 U/L
MCH RBC QN AUTO: 27.9 PG (ref 26–34)
MCH RBC QN AUTO: 28.9 PG (ref 26–34)
MCHC RBC AUTO-ENTMCNC: 31 G/DL (ref 30–36.5)
MCHC RBC AUTO-ENTMCNC: 32.1 G/DL (ref 30–36.5)
MCV RBC AUTO: 90 FL (ref 80–99)
MCV RBC AUTO: 90.2 FL (ref 80–99)
NRBC # BLD: 0 K/UL (ref 0–0.01)
NRBC # BLD: 0 K/UL (ref 0–0.01)
NRBC BLD-RTO: 0 PER 100 WBC
NRBC BLD-RTO: 0 PER 100 WBC
PLATELET # BLD AUTO: 356 K/UL (ref 150–400)
PLATELET # BLD AUTO: 386 K/UL (ref 150–400)
PMV BLD AUTO: 10.4 FL (ref 8.9–12.9)
PMV BLD AUTO: 9.9 FL (ref 8.9–12.9)
PROT FLD-MCNC: 2.9 G/DL
RBC # BLD AUTO: 2.49 M/UL (ref 3.8–5.2)
RBC # BLD AUTO: 2.65 M/UL (ref 3.8–5.2)
SPECIMEN SOURCE FLD: NORMAL
SPECIMEN SOURCE FLD: NORMAL
WBC # BLD AUTO: 10.4 K/UL (ref 3.6–11)
WBC # BLD AUTO: 10.8 K/UL (ref 3.6–11)

## 2024-04-13 PROCEDURE — 85027 COMPLETE CBC AUTOMATED: CPT

## 2024-04-13 PROCEDURE — 6370000000 HC RX 637 (ALT 250 FOR IP): Performed by: PHYSICIAN ASSISTANT

## 2024-04-13 PROCEDURE — 6360000002 HC RX W HCPCS: Performed by: GENERAL ACUTE CARE HOSPITAL

## 2024-04-13 PROCEDURE — 2580000003 HC RX 258: Performed by: INTERNAL MEDICINE

## 2024-04-13 PROCEDURE — 6360000002 HC RX W HCPCS: Performed by: STUDENT IN AN ORGANIZED HEALTH CARE EDUCATION/TRAINING PROGRAM

## 2024-04-13 PROCEDURE — 6360000002 HC RX W HCPCS: Performed by: INTERNAL MEDICINE

## 2024-04-13 PROCEDURE — 2060000000 HC ICU INTERMEDIATE R&B

## 2024-04-13 PROCEDURE — 2580000003 HC RX 258: Performed by: GENERAL ACUTE CARE HOSPITAL

## 2024-04-13 PROCEDURE — 6370000000 HC RX 637 (ALT 250 FOR IP): Performed by: GENERAL ACUTE CARE HOSPITAL

## 2024-04-13 PROCEDURE — 6370000000 HC RX 637 (ALT 250 FOR IP): Performed by: INTERNAL MEDICINE

## 2024-04-13 PROCEDURE — 2700000000 HC OXYGEN THERAPY PER DAY

## 2024-04-13 PROCEDURE — 36415 COLL VENOUS BLD VENIPUNCTURE: CPT

## 2024-04-13 RX ORDER — MAGNESIUM SULFATE IN WATER 40 MG/ML
2000 INJECTION, SOLUTION INTRAVENOUS ONCE
Status: COMPLETED | OUTPATIENT
Start: 2024-04-13 | End: 2024-04-13

## 2024-04-13 RX ADMIN — PIPERACILLIN AND TAZOBACTAM 3375 MG: 3; .375 INJECTION, POWDER, LYOPHILIZED, FOR SOLUTION INTRAVENOUS at 20:40

## 2024-04-13 RX ADMIN — VANCOMYCIN HYDROCHLORIDE 750 MG: 750 INJECTION, POWDER, LYOPHILIZED, FOR SOLUTION INTRAVENOUS at 03:19

## 2024-04-13 RX ADMIN — SENNOSIDES AND DOCUSATE SODIUM 1 TABLET: 50; 8.6 TABLET ORAL at 08:51

## 2024-04-13 RX ADMIN — PANTOPRAZOLE SODIUM 40 MG: 40 TABLET, DELAYED RELEASE ORAL at 15:33

## 2024-04-13 RX ADMIN — DIVALPROEX SODIUM 250 MG: 250 TABLET, DELAYED RELEASE ORAL at 08:51

## 2024-04-13 RX ADMIN — ATORVASTATIN CALCIUM 20 MG: 20 TABLET, FILM COATED ORAL at 08:50

## 2024-04-13 RX ADMIN — POTASSIUM CHLORIDE 20 MEQ: 750 TABLET, FILM COATED, EXTENDED RELEASE ORAL at 08:50

## 2024-04-13 RX ADMIN — PANTOPRAZOLE SODIUM 40 MG: 40 TABLET, DELAYED RELEASE ORAL at 06:25

## 2024-04-13 RX ADMIN — ASPIRIN 81 MG: 81 TABLET, COATED ORAL at 08:51

## 2024-04-13 RX ADMIN — DIVALPROEX SODIUM 250 MG: 250 TABLET, DELAYED RELEASE ORAL at 20:41

## 2024-04-13 RX ADMIN — FUROSEMIDE 20 MG: 10 INJECTION, SOLUTION INTRAMUSCULAR; INTRAVENOUS at 09:55

## 2024-04-13 RX ADMIN — PIPERACILLIN AND TAZOBACTAM 3375 MG: 3; .375 INJECTION, POWDER, LYOPHILIZED, FOR SOLUTION INTRAVENOUS at 04:30

## 2024-04-13 RX ADMIN — SODIUM CHLORIDE, PRESERVATIVE FREE 10 ML: 5 INJECTION INTRAVENOUS at 20:42

## 2024-04-13 RX ADMIN — MIRTAZAPINE 15 MG: 15 TABLET, FILM COATED ORAL at 20:41

## 2024-04-13 RX ADMIN — DIAZEPAM 1 MG: 2 TABLET ORAL at 20:41

## 2024-04-13 RX ADMIN — PIPERACILLIN AND TAZOBACTAM 3375 MG: 3; .375 INJECTION, POWDER, LYOPHILIZED, FOR SOLUTION INTRAVENOUS at 12:03

## 2024-04-13 RX ADMIN — SODIUM CHLORIDE, PRESERVATIVE FREE 10 ML: 5 INJECTION INTRAVENOUS at 08:59

## 2024-04-13 RX ADMIN — VANCOMYCIN HYDROCHLORIDE 750 MG: 750 INJECTION, POWDER, LYOPHILIZED, FOR SOLUTION INTRAVENOUS at 15:32

## 2024-04-13 RX ADMIN — MAGNESIUM SULFATE HEPTAHYDRATE 2000 MG: 40 INJECTION, SOLUTION INTRAVENOUS at 08:49

## 2024-04-13 RX ADMIN — FUROSEMIDE 20 MG: 10 INJECTION, SOLUTION INTRAMUSCULAR; INTRAVENOUS at 17:21

## 2024-04-13 ASSESSMENT — PAIN SCALES - GENERAL
PAINLEVEL_OUTOF10: 0

## 2024-04-13 ASSESSMENT — PAIN SCALES - WONG BAKER: WONGBAKER_NUMERICALRESPONSE: NO HURT

## 2024-04-14 ENCOUNTER — APPOINTMENT (OUTPATIENT)
Facility: HOSPITAL | Age: 69
DRG: 853 | End: 2024-04-14
Payer: COMMERCIAL

## 2024-04-14 LAB
ANION GAP SERPL CALC-SCNC: 5 MMOL/L (ref 5–15)
ANION GAP SERPL CALC-SCNC: 8 MMOL/L (ref 5–15)
BASOPHILS # BLD: 0.1 K/UL (ref 0–0.1)
BASOPHILS NFR BLD: 1 % (ref 0–1)
BUN SERPL-MCNC: 12 MG/DL (ref 6–20)
BUN SERPL-MCNC: 12 MG/DL (ref 6–20)
BUN/CREAT SERPL: 14 (ref 12–20)
BUN/CREAT SERPL: 14 (ref 12–20)
CALCIUM SERPL-MCNC: 8.6 MG/DL (ref 8.5–10.1)
CALCIUM SERPL-MCNC: 9.6 MG/DL (ref 8.5–10.1)
CHLORIDE SERPL-SCNC: 89 MMOL/L (ref 97–108)
CHLORIDE SERPL-SCNC: 91 MMOL/L (ref 97–108)
CO2 SERPL-SCNC: 29 MMOL/L (ref 21–32)
CO2 SERPL-SCNC: 33 MMOL/L (ref 21–32)
CREAT SERPL-MCNC: 0.83 MG/DL (ref 0.55–1.02)
CREAT SERPL-MCNC: 0.87 MG/DL (ref 0.55–1.02)
DIFFERENTIAL METHOD BLD: ABNORMAL
EOSINOPHIL # BLD: 0.1 K/UL (ref 0–0.4)
EOSINOPHIL NFR BLD: 1 % (ref 0–7)
ERYTHROCYTE [DISTWIDTH] IN BLOOD BY AUTOMATED COUNT: 18.8 % (ref 11.5–14.5)
GLUCOSE SERPL-MCNC: 85 MG/DL (ref 65–100)
GLUCOSE SERPL-MCNC: 91 MG/DL (ref 65–100)
HCT VFR BLD AUTO: 26.4 % (ref 35–47)
HCV GENTYP SERPL NAA+PROBE: NORMAL
HCV RNA SERPL NAA+PROBE-ACNC: NORMAL IU/ML
HCV RNA SERPL NAA+PROBE-LOG IU: NORMAL LOG10 IU/ML
HGB BLD-MCNC: 8.9 G/DL (ref 11.5–16)
IMM GRANULOCYTES # BLD AUTO: 0.1 K/UL (ref 0–0.04)
IMM GRANULOCYTES NFR BLD AUTO: 1 % (ref 0–0.5)
LABORATORY COMMENT REPORT: NORMAL
LYMPHOCYTES # BLD: 1.5 K/UL (ref 0.8–3.5)
LYMPHOCYTES NFR BLD: 12 % (ref 12–49)
MAGNESIUM SERPL-MCNC: 1.9 MG/DL (ref 1.6–2.4)
MCH RBC QN AUTO: 30.7 PG (ref 26–34)
MCHC RBC AUTO-ENTMCNC: 33.7 G/DL (ref 30–36.5)
MCV RBC AUTO: 91 FL (ref 80–99)
MONOCYTES # BLD: 1.9 K/UL (ref 0–1)
MONOCYTES NFR BLD: 15 % (ref 5–13)
NEUTS SEG # BLD: 8.7 K/UL (ref 1.8–8)
NEUTS SEG NFR BLD: 70 % (ref 32–75)
NRBC # BLD: 0 K/UL (ref 0–0.01)
NRBC BLD-RTO: 0 PER 100 WBC
PLATELET # BLD AUTO: 496 K/UL (ref 150–400)
PMV BLD AUTO: 10.5 FL (ref 8.9–12.9)
POTASSIUM SERPL-SCNC: 2.9 MMOL/L (ref 3.5–5.1)
POTASSIUM SERPL-SCNC: 3.7 MMOL/L (ref 3.5–5.1)
RBC # BLD AUTO: 2.9 M/UL (ref 3.8–5.2)
SODIUM SERPL-SCNC: 125 MMOL/L (ref 136–145)
SODIUM SERPL-SCNC: 130 MMOL/L (ref 136–145)
VANCOMYCIN SERPL-MCNC: 8.9 UG/ML
WBC # BLD AUTO: 12.4 K/UL (ref 3.6–11)

## 2024-04-14 PROCEDURE — 6360000002 HC RX W HCPCS: Performed by: STUDENT IN AN ORGANIZED HEALTH CARE EDUCATION/TRAINING PROGRAM

## 2024-04-14 PROCEDURE — 6360000002 HC RX W HCPCS: Performed by: INTERNAL MEDICINE

## 2024-04-14 PROCEDURE — 6370000000 HC RX 637 (ALT 250 FOR IP): Performed by: INTERNAL MEDICINE

## 2024-04-14 PROCEDURE — 71045 X-RAY EXAM CHEST 1 VIEW: CPT

## 2024-04-14 PROCEDURE — 2700000000 HC OXYGEN THERAPY PER DAY

## 2024-04-14 PROCEDURE — 83735 ASSAY OF MAGNESIUM: CPT

## 2024-04-14 PROCEDURE — 2580000003 HC RX 258: Performed by: INTERNAL MEDICINE

## 2024-04-14 PROCEDURE — 80202 ASSAY OF VANCOMYCIN: CPT

## 2024-04-14 PROCEDURE — 87040 BLOOD CULTURE FOR BACTERIA: CPT

## 2024-04-14 PROCEDURE — 6370000000 HC RX 637 (ALT 250 FOR IP): Performed by: PHYSICIAN ASSISTANT

## 2024-04-14 PROCEDURE — 2580000003 HC RX 258: Performed by: STUDENT IN AN ORGANIZED HEALTH CARE EDUCATION/TRAINING PROGRAM

## 2024-04-14 PROCEDURE — 83935 ASSAY OF URINE OSMOLALITY: CPT

## 2024-04-14 PROCEDURE — 36415 COLL VENOUS BLD VENIPUNCTURE: CPT

## 2024-04-14 PROCEDURE — 85025 COMPLETE CBC W/AUTO DIFF WBC: CPT

## 2024-04-14 PROCEDURE — 2060000000 HC ICU INTERMEDIATE R&B

## 2024-04-14 PROCEDURE — 6370000000 HC RX 637 (ALT 250 FOR IP): Performed by: STUDENT IN AN ORGANIZED HEALTH CARE EDUCATION/TRAINING PROGRAM

## 2024-04-14 PROCEDURE — 80048 BASIC METABOLIC PNL TOTAL CA: CPT

## 2024-04-14 RX ORDER — POTASSIUM CHLORIDE 7.45 MG/ML
10 INJECTION INTRAVENOUS
Status: COMPLETED | OUTPATIENT
Start: 2024-04-14 | End: 2024-04-14

## 2024-04-14 RX ADMIN — POTASSIUM CHLORIDE 10 MEQ: 7.46 INJECTION, SOLUTION INTRAVENOUS at 20:54

## 2024-04-14 RX ADMIN — PIPERACILLIN AND TAZOBACTAM 3375 MG: 3; .375 INJECTION, POWDER, LYOPHILIZED, FOR SOLUTION INTRAVENOUS at 14:55

## 2024-04-14 RX ADMIN — POTASSIUM CHLORIDE 10 MEQ: 7.46 INJECTION, SOLUTION INTRAVENOUS at 19:50

## 2024-04-14 RX ADMIN — VANCOMYCIN HYDROCHLORIDE 750 MG: 750 INJECTION, POWDER, LYOPHILIZED, FOR SOLUTION INTRAVENOUS at 18:45

## 2024-04-14 RX ADMIN — DIAZEPAM 1 MG: 2 TABLET ORAL at 21:07

## 2024-04-14 RX ADMIN — SODIUM CHLORIDE, PRESERVATIVE FREE 10 ML: 5 INJECTION INTRAVENOUS at 10:08

## 2024-04-14 RX ADMIN — POTASSIUM CHLORIDE 10 MEQ: 7.46 INJECTION, SOLUTION INTRAVENOUS at 18:46

## 2024-04-14 RX ADMIN — FUROSEMIDE 20 MG: 10 INJECTION, SOLUTION INTRAMUSCULAR; INTRAVENOUS at 09:58

## 2024-04-14 RX ADMIN — FUROSEMIDE 20 MG: 10 INJECTION, SOLUTION INTRAMUSCULAR; INTRAVENOUS at 18:43

## 2024-04-14 RX ADMIN — SODIUM CHLORIDE: 9 INJECTION, SOLUTION INTRAVENOUS at 18:43

## 2024-04-14 RX ADMIN — SODIUM CHLORIDE, PRESERVATIVE FREE 10 ML: 5 INJECTION INTRAVENOUS at 19:51

## 2024-04-14 RX ADMIN — MIRTAZAPINE 15 MG: 15 TABLET, FILM COATED ORAL at 21:07

## 2024-04-14 RX ADMIN — DIVALPROEX SODIUM 250 MG: 250 TABLET, DELAYED RELEASE ORAL at 14:12

## 2024-04-14 RX ADMIN — SODIUM CHLORIDE: 9 INJECTION, SOLUTION INTRAVENOUS at 14:54

## 2024-04-14 RX ADMIN — POTASSIUM & SODIUM PHOSPHATES POWDER PACK 280-160-250 MG 1000 MG: 280-160-250 PACK at 18:46

## 2024-04-14 RX ADMIN — PANTOPRAZOLE SODIUM 40 MG: 40 TABLET, DELAYED RELEASE ORAL at 14:12

## 2024-04-14 RX ADMIN — PIPERACILLIN AND TAZOBACTAM 3375 MG: 3; .375 INJECTION, POWDER, LYOPHILIZED, FOR SOLUTION INTRAVENOUS at 19:52

## 2024-04-14 RX ADMIN — ASPIRIN 81 MG: 81 TABLET, COATED ORAL at 14:12

## 2024-04-14 RX ADMIN — DIVALPROEX SODIUM 250 MG: 250 TABLET, DELAYED RELEASE ORAL at 21:07

## 2024-04-14 RX ADMIN — ATORVASTATIN CALCIUM 20 MG: 20 TABLET, FILM COATED ORAL at 14:12

## 2024-04-14 ASSESSMENT — PAIN SCALES - GENERAL: PAINLEVEL_OUTOF10: 0

## 2024-04-14 ASSESSMENT — PAIN SCALES - WONG BAKER: WONGBAKER_NUMERICALRESPONSE: NO HURT

## 2024-04-15 ENCOUNTER — APPOINTMENT (OUTPATIENT)
Facility: HOSPITAL | Age: 69
DRG: 853 | End: 2024-04-15
Payer: COMMERCIAL

## 2024-04-15 PROBLEM — J18.1 CONSOLIDATION OF RIGHT LOWER LOBE OF LUNG (HCC): Status: ACTIVE | Noted: 2024-04-15

## 2024-04-15 PROBLEM — F31.9 BIPOLAR DISORDER (HCC): Status: ACTIVE | Noted: 2024-04-15

## 2024-04-15 PROBLEM — K92.2 ACUTE GI BLEEDING: Status: ACTIVE | Noted: 2024-04-15

## 2024-04-15 PROBLEM — R41.82 ALTERED MENTAL STATUS: Status: ACTIVE | Noted: 2024-04-15

## 2024-04-15 PROBLEM — R65.20 SEVERE SEPSIS (HCC): Status: ACTIVE | Noted: 2024-04-03

## 2024-04-15 PROBLEM — J90 RECURRENT RIGHT PLEURAL EFFUSION: Status: ACTIVE | Noted: 2024-04-15

## 2024-04-15 PROBLEM — E87.1 HYPONATREMIA: Status: ACTIVE | Noted: 2024-04-15

## 2024-04-15 PROBLEM — B18.2 CHRONIC HEPATITIS C WITHOUT HEPATIC COMA (HCC): Status: ACTIVE | Noted: 2024-04-15

## 2024-04-15 PROBLEM — J18.9 PNEUMONIA OF RIGHT LOWER LOBE DUE TO INFECTIOUS ORGANISM: Status: ACTIVE | Noted: 2024-04-15

## 2024-04-15 PROBLEM — R59.1 LYMPHADENOPATHY: Status: ACTIVE | Noted: 2024-04-15

## 2024-04-15 LAB
ABO + RH BLD: NORMAL
ABO + RH BLD: NORMAL
ANION GAP SERPL CALC-SCNC: 8 MMOL/L (ref 5–15)
ANTIGENS PRESENT RBC DONR: NORMAL
BASOPHILS # BLD: 0 K/UL (ref 0–0.1)
BASOPHILS # BLD: 0 K/UL (ref 0–0.1)
BASOPHILS NFR BLD: 0 % (ref 0–1)
BASOPHILS NFR BLD: 0 % (ref 0–1)
BLD PROD TYP BPU: NORMAL
BLOOD BANK DISPENSE STATUS: NORMAL
BLOOD GROUP ANTIBODIES SERPL: NORMAL
BLOOD GROUP ANTIBODIES SERPL: NORMAL
BPU ID: NORMAL
BUN SERPL-MCNC: 13 MG/DL (ref 6–20)
BUN/CREAT SERPL: 15 (ref 12–20)
CALCIUM SERPL-MCNC: 8.5 MG/DL (ref 8.5–10.1)
CHLORIDE SERPL-SCNC: 94 MMOL/L (ref 97–108)
CO2 SERPL-SCNC: 29 MMOL/L (ref 21–32)
CREAT SERPL-MCNC: 0.86 MG/DL (ref 0.55–1.02)
CROSSMATCH RESULT: NORMAL
CYTOLOGY-NON GYN: NORMAL
DIFFERENTIAL METHOD BLD: ABNORMAL
DIFFERENTIAL METHOD BLD: ABNORMAL
EOSINOPHIL # BLD: 0.1 K/UL (ref 0–0.4)
EOSINOPHIL # BLD: 0.1 K/UL (ref 0–0.4)
EOSINOPHIL NFR BLD: 1 % (ref 0–7)
EOSINOPHIL NFR BLD: 1 % (ref 0–7)
ERYTHROCYTE [DISTWIDTH] IN BLOOD BY AUTOMATED COUNT: 18.5 % (ref 11.5–14.5)
ERYTHROCYTE [DISTWIDTH] IN BLOOD BY AUTOMATED COUNT: 18.6 % (ref 11.5–14.5)
GLUCOSE BLD STRIP.AUTO-MCNC: 95 MG/DL (ref 65–117)
GLUCOSE SERPL-MCNC: 93 MG/DL (ref 65–100)
HCT VFR BLD AUTO: 20.4 % (ref 35–47)
HCT VFR BLD AUTO: 20.5 % (ref 35–47)
HCT VFR BLD AUTO: 23.9 % (ref 35–47)
HGB BLD-MCNC: 6.5 G/DL (ref 11.5–16)
HGB BLD-MCNC: 6.7 G/DL (ref 11.5–16)
HGB BLD-MCNC: 7.6 G/DL (ref 11.5–16)
HISTORY CHECK: NORMAL
IMM GRANULOCYTES # BLD AUTO: 0.1 K/UL (ref 0–0.04)
IMM GRANULOCYTES # BLD AUTO: 0.1 K/UL (ref 0–0.04)
IMM GRANULOCYTES NFR BLD AUTO: 1 % (ref 0–0.5)
IMM GRANULOCYTES NFR BLD AUTO: 1 % (ref 0–0.5)
LYMPHOCYTES # BLD: 1.4 K/UL (ref 0.8–3.5)
LYMPHOCYTES # BLD: 1.8 K/UL (ref 0.8–3.5)
LYMPHOCYTES NFR BLD: 12 % (ref 12–49)
LYMPHOCYTES NFR BLD: 15 % (ref 12–49)
MAGNESIUM SERPL-MCNC: 1.7 MG/DL (ref 1.6–2.4)
MCH RBC QN AUTO: 28.4 PG (ref 26–34)
MCH RBC QN AUTO: 29.1 PG (ref 26–34)
MCHC RBC AUTO-ENTMCNC: 31.9 G/DL (ref 30–36.5)
MCHC RBC AUTO-ENTMCNC: 32.7 G/DL (ref 30–36.5)
MCV RBC AUTO: 89.1 FL (ref 80–99)
MCV RBC AUTO: 89.1 FL (ref 80–99)
MONOCYTES # BLD: 2.3 K/UL (ref 0–1)
MONOCYTES # BLD: 2.3 K/UL (ref 0–1)
MONOCYTES NFR BLD: 20 % (ref 5–13)
MONOCYTES NFR BLD: 20 % (ref 5–13)
NEUTS SEG # BLD: 7.4 K/UL (ref 1.8–8)
NEUTS SEG # BLD: 7.7 K/UL (ref 1.8–8)
NEUTS SEG NFR BLD: 63 % (ref 32–75)
NEUTS SEG NFR BLD: 66 % (ref 32–75)
NRBC # BLD: 0 K/UL (ref 0–0.01)
NRBC # BLD: 0 K/UL (ref 0–0.01)
NRBC BLD-RTO: 0 PER 100 WBC
NRBC BLD-RTO: 0 PER 100 WBC
OSMOLALITY UR: 413 MOSM/KG H2O
PLATELET # BLD AUTO: 415 K/UL (ref 150–400)
PLATELET # BLD AUTO: 428 K/UL (ref 150–400)
PMV BLD AUTO: 10.1 FL (ref 8.9–12.9)
PMV BLD AUTO: 9.8 FL (ref 8.9–12.9)
RBC # BLD AUTO: 2.29 M/UL (ref 3.8–5.2)
RBC # BLD AUTO: 2.3 M/UL (ref 3.8–5.2)
RBC MORPH BLD: ABNORMAL
SERVICE CMNT-IMP: NORMAL
SODIUM SERPL-SCNC: 129 MMOL/L (ref 136–145)
SODIUM SERPL-SCNC: 131 MMOL/L (ref 136–145)
SODIUM SERPL-SCNC: 131 MMOL/L (ref 136–145)
UNIT DIVISION: 0
WBC # BLD AUTO: 11.6 K/UL (ref 3.6–11)
WBC # BLD AUTO: 11.7 K/UL (ref 3.6–11)

## 2024-04-15 PROCEDURE — 86078 PHYS BLOOD BANK SERV REACTJ: CPT

## 2024-04-15 PROCEDURE — 6360000002 HC RX W HCPCS: Performed by: STUDENT IN AN ORGANIZED HEALTH CARE EDUCATION/TRAINING PROGRAM

## 2024-04-15 PROCEDURE — 85018 HEMOGLOBIN: CPT

## 2024-04-15 PROCEDURE — 2700000000 HC OXYGEN THERAPY PER DAY

## 2024-04-15 PROCEDURE — 6370000000 HC RX 637 (ALT 250 FOR IP): Performed by: PHYSICIAN ASSISTANT

## 2024-04-15 PROCEDURE — 86922 COMPATIBILITY TEST ANTIGLOB: CPT

## 2024-04-15 PROCEDURE — 6370000000 HC RX 637 (ALT 250 FOR IP): Performed by: GENERAL ACUTE CARE HOSPITAL

## 2024-04-15 PROCEDURE — 2580000003 HC RX 258: Performed by: STUDENT IN AN ORGANIZED HEALTH CARE EDUCATION/TRAINING PROGRAM

## 2024-04-15 PROCEDURE — 82962 GLUCOSE BLOOD TEST: CPT

## 2024-04-15 PROCEDURE — 86850 RBC ANTIBODY SCREEN: CPT

## 2024-04-15 PROCEDURE — 36430 TRANSFUSION BLD/BLD COMPNT: CPT

## 2024-04-15 PROCEDURE — 2720000010 HC SURG SUPPLY STERILE: Performed by: SPECIALIST

## 2024-04-15 PROCEDURE — 2580000003 HC RX 258: Performed by: INTERNAL MEDICINE

## 2024-04-15 PROCEDURE — 2709999900 HC NON-CHARGEABLE SUPPLY: Performed by: SPECIALIST

## 2024-04-15 PROCEDURE — 86870 RBC ANTIBODY IDENTIFICATION: CPT

## 2024-04-15 PROCEDURE — 86900 BLOOD TYPING SEROLOGIC ABO: CPT

## 2024-04-15 PROCEDURE — 86920 COMPATIBILITY TEST SPIN: CPT

## 2024-04-15 PROCEDURE — 2580000003 HC RX 258: Performed by: NURSE PRACTITIONER

## 2024-04-15 PROCEDURE — 83735 ASSAY OF MAGNESIUM: CPT

## 2024-04-15 PROCEDURE — 6360000002 HC RX W HCPCS: Performed by: INTERNAL MEDICINE

## 2024-04-15 PROCEDURE — 85025 COMPLETE CBC W/AUTO DIFF WBC: CPT

## 2024-04-15 PROCEDURE — 3600007502: Performed by: SPECIALIST

## 2024-04-15 PROCEDURE — 6370000000 HC RX 637 (ALT 250 FOR IP): Performed by: NURSE PRACTITIONER

## 2024-04-15 PROCEDURE — 86860 RBC ANTIBODY ELUTION: CPT

## 2024-04-15 PROCEDURE — 86921 COMPATIBILITY TEST INCUBATE: CPT

## 2024-04-15 PROCEDURE — 2060000000 HC ICU INTERMEDIATE R&B

## 2024-04-15 PROCEDURE — 6370000000 HC RX 637 (ALT 250 FOR IP): Performed by: INTERNAL MEDICINE

## 2024-04-15 PROCEDURE — 85014 HEMATOCRIT: CPT

## 2024-04-15 PROCEDURE — 99233 SBSQ HOSP IP/OBS HIGH 50: CPT | Performed by: INTERNAL MEDICINE

## 2024-04-15 PROCEDURE — P9016 RBC LEUKOCYTES REDUCED: HCPCS

## 2024-04-15 PROCEDURE — 0DJ07ZZ INSPECTION OF UPPER INTESTINAL TRACT, VIA NATURAL OR ARTIFICIAL OPENING: ICD-10-PCS | Performed by: SPECIALIST

## 2024-04-15 PROCEDURE — 6370000000 HC RX 637 (ALT 250 FOR IP): Performed by: STUDENT IN AN ORGANIZED HEALTH CARE EDUCATION/TRAINING PROGRAM

## 2024-04-15 PROCEDURE — 71045 X-RAY EXAM CHEST 1 VIEW: CPT

## 2024-04-15 PROCEDURE — 36415 COLL VENOUS BLD VENIPUNCTURE: CPT

## 2024-04-15 PROCEDURE — 86901 BLOOD TYPING SEROLOGIC RH(D): CPT

## 2024-04-15 PROCEDURE — 86902 BLOOD TYPE ANTIGEN DONOR EA: CPT

## 2024-04-15 PROCEDURE — 7100000010 HC PHASE II RECOVERY - FIRST 15 MIN: Performed by: SPECIALIST

## 2024-04-15 PROCEDURE — 3600007512: Performed by: SPECIALIST

## 2024-04-15 PROCEDURE — 84295 ASSAY OF SERUM SODIUM: CPT

## 2024-04-15 PROCEDURE — 86880 COOMBS TEST DIRECT: CPT

## 2024-04-15 PROCEDURE — 80048 BASIC METABOLIC PNL TOTAL CA: CPT

## 2024-04-15 PROCEDURE — 6360000002 HC RX W HCPCS: Performed by: NURSE PRACTITIONER

## 2024-04-15 RX ORDER — SODIUM CHLORIDE 9 MG/ML
INJECTION, SOLUTION INTRAVENOUS PRN
Status: DISCONTINUED | OUTPATIENT
Start: 2024-04-15 | End: 2024-04-20

## 2024-04-15 RX ORDER — SODIUM CHLORIDE 9 MG/ML
INJECTION, SOLUTION INTRAVENOUS CONTINUOUS
Status: DISCONTINUED | OUTPATIENT
Start: 2024-04-15 | End: 2024-04-16

## 2024-04-15 RX ORDER — SUCRALFATE 1 G/1
1 TABLET ORAL
Status: DISCONTINUED | OUTPATIENT
Start: 2024-04-15 | End: 2024-05-07 | Stop reason: HOSPADM

## 2024-04-15 RX ADMIN — ATORVASTATIN CALCIUM 20 MG: 20 TABLET, FILM COATED ORAL at 10:10

## 2024-04-15 RX ADMIN — POTASSIUM CHLORIDE 20 MEQ: 750 TABLET, FILM COATED, EXTENDED RELEASE ORAL at 10:08

## 2024-04-15 RX ADMIN — PIPERACILLIN AND TAZOBACTAM 3375 MG: 3; .375 INJECTION, POWDER, LYOPHILIZED, FOR SOLUTION INTRAVENOUS at 04:05

## 2024-04-15 RX ADMIN — METOPROLOL TARTRATE 12.5 MG: 25 TABLET, FILM COATED ORAL at 21:21

## 2024-04-15 RX ADMIN — POTASSIUM BICARBONATE 40 MEQ: 782 TABLET, EFFERVESCENT ORAL at 06:35

## 2024-04-15 RX ADMIN — MIRTAZAPINE 15 MG: 15 TABLET, FILM COATED ORAL at 21:21

## 2024-04-15 RX ADMIN — DIVALPROEX SODIUM 250 MG: 250 TABLET, DELAYED RELEASE ORAL at 10:10

## 2024-04-15 RX ADMIN — SODIUM CHLORIDE: 9 INJECTION, SOLUTION INTRAVENOUS at 10:06

## 2024-04-15 RX ADMIN — DIVALPROEX SODIUM 250 MG: 250 TABLET, DELAYED RELEASE ORAL at 21:22

## 2024-04-15 RX ADMIN — PIPERACILLIN AND TAZOBACTAM 3375 MG: 3; .375 INJECTION, POWDER, LYOPHILIZED, FOR SOLUTION INTRAVENOUS at 20:15

## 2024-04-15 RX ADMIN — SUCRALFATE 1 G: 1 TABLET ORAL at 10:08

## 2024-04-15 RX ADMIN — VANCOMYCIN HYDROCHLORIDE 750 MG: 750 INJECTION, POWDER, LYOPHILIZED, FOR SOLUTION INTRAVENOUS at 16:32

## 2024-04-15 RX ADMIN — SUCRALFATE 1 G: 1 TABLET ORAL at 17:46

## 2024-04-15 RX ADMIN — VANCOMYCIN HYDROCHLORIDE 750 MG: 750 INJECTION, POWDER, LYOPHILIZED, FOR SOLUTION INTRAVENOUS at 02:04

## 2024-04-15 RX ADMIN — PIPERACILLIN AND TAZOBACTAM 3375 MG: 3; .375 INJECTION, POWDER, LYOPHILIZED, FOR SOLUTION INTRAVENOUS at 14:38

## 2024-04-15 RX ADMIN — PANTOPRAZOLE SODIUM 40 MG: 40 TABLET, DELAYED RELEASE ORAL at 17:46

## 2024-04-15 RX ADMIN — SODIUM CHLORIDE: 9 INJECTION, SOLUTION INTRAVENOUS at 16:28

## 2024-04-15 RX ADMIN — SUCRALFATE 1 G: 1 TABLET ORAL at 21:21

## 2024-04-15 RX ADMIN — PANTOPRAZOLE SODIUM 40 MG: 40 TABLET, DELAYED RELEASE ORAL at 06:35

## 2024-04-15 RX ADMIN — SODIUM CHLORIDE, PRESERVATIVE FREE 10 ML: 5 INJECTION INTRAVENOUS at 10:10

## 2024-04-15 RX ADMIN — DIAZEPAM 1 MG: 2 TABLET ORAL at 21:21

## 2024-04-15 RX ADMIN — METOPROLOL TARTRATE 12.5 MG: 25 TABLET, FILM COATED ORAL at 10:11

## 2024-04-15 ASSESSMENT — PAIN SCALES - GENERAL: PAINLEVEL_OUTOF10: 0

## 2024-04-16 ENCOUNTER — APPOINTMENT (OUTPATIENT)
Facility: HOSPITAL | Age: 69
DRG: 853 | End: 2024-04-16
Payer: COMMERCIAL

## 2024-04-16 LAB
ABO + RH BLD: NORMAL
ABO/RH(D) PRE-TRANSFUSION: NORMAL
BACTERIA SPEC CULT: NORMAL
BASOPHILS # BLD: 0.1 K/UL (ref 0–0.1)
BASOPHILS NFR BLD: 1 % (ref 0–1)
BLD PROD TYP BPU: NORMAL
BLOOD BAG HEMOLYSIS: NORMAL
CLERICAL ERRORS: NORMAL
DAT P TRANSF RBC QL: NORMAL
DAT RBC QL: NORMAL
DIFFERENTIAL METHOD BLD: ABNORMAL
EOSINOPHIL # BLD: 0.1 K/UL (ref 0–0.4)
EOSINOPHIL NFR BLD: 1 % (ref 0–7)
ERYTHROCYTE [DISTWIDTH] IN BLOOD BY AUTOMATED COUNT: 18 % (ref 11.5–14.5)
GRAM STN SPEC: NORMAL
GRAM STN SPEC: NORMAL
HCT VFR BLD AUTO: 21.8 % (ref 35–47)
HCT VFR BLD AUTO: 29.3 % (ref 35–47)
HEMOLYSIS, POST TRANSFUSION: NORMAL
HEMOLYSIS, PRE TRANSFUSION: NORMAL
HGB BLD-MCNC: 6.7 G/DL (ref 11.5–16)
HGB BLD-MCNC: 9.4 G/DL (ref 11.5–16)
HISTORY CHECK: NORMAL
IMM GRANULOCYTES # BLD AUTO: 0.1 K/UL (ref 0–0.04)
IMM GRANULOCYTES NFR BLD AUTO: 1 % (ref 0–0.5)
LYMPHOCYTES # BLD: 1.1 K/UL (ref 0.8–3.5)
LYMPHOCYTES NFR BLD: 11 % (ref 12–49)
MCH RBC QN AUTO: 28 PG (ref 26–34)
MCHC RBC AUTO-ENTMCNC: 30.7 G/DL (ref 30–36.5)
MCV RBC AUTO: 91.2 FL (ref 80–99)
MD INTERPRETATION: NORMAL
MONOCYTES # BLD: 2.1 K/UL (ref 0–1)
MONOCYTES NFR BLD: 20 % (ref 5–13)
NEUTS SEG # BLD: 6.9 K/UL (ref 1.8–8)
NEUTS SEG NFR BLD: 66 % (ref 32–75)
NRBC # BLD: 0 K/UL (ref 0–0.01)
NRBC BLD-RTO: 0 PER 100 WBC
PATH REV BLD -IMP: NORMAL
PLATELET # BLD AUTO: 466 K/UL (ref 150–400)
PMV BLD AUTO: 10 FL (ref 8.9–12.9)
RBC # BLD AUTO: 2.39 M/UL (ref 3.8–5.2)
RBC MORPH BLD: ABNORMAL
SERVICE CMNT-IMP: NORMAL
SODIUM SERPL-SCNC: 131 MMOL/L (ref 136–145)
SODIUM SERPL-SCNC: 132 MMOL/L (ref 136–145)
TRXN WORKUP COMMENT,TRCOM: NORMAL
UNIT NUMBER: NORMAL
WBC # BLD AUTO: 10.4 K/UL (ref 3.6–11)

## 2024-04-16 PROCEDURE — 51798 US URINE CAPACITY MEASURE: CPT

## 2024-04-16 PROCEDURE — 84295 ASSAY OF SERUM SODIUM: CPT

## 2024-04-16 PROCEDURE — 6370000000 HC RX 637 (ALT 250 FOR IP): Performed by: INTERNAL MEDICINE

## 2024-04-16 PROCEDURE — P9016 RBC LEUKOCYTES REDUCED: HCPCS

## 2024-04-16 PROCEDURE — 6370000000 HC RX 637 (ALT 250 FOR IP): Performed by: PHYSICIAN ASSISTANT

## 2024-04-16 PROCEDURE — 2060000000 HC ICU INTERMEDIATE R&B

## 2024-04-16 PROCEDURE — 6360000002 HC RX W HCPCS: Performed by: NURSE PRACTITIONER

## 2024-04-16 PROCEDURE — 2580000003 HC RX 258: Performed by: NURSE PRACTITIONER

## 2024-04-16 PROCEDURE — 71260 CT THORAX DX C+: CPT

## 2024-04-16 PROCEDURE — 71045 X-RAY EXAM CHEST 1 VIEW: CPT

## 2024-04-16 PROCEDURE — 2700000000 HC OXYGEN THERAPY PER DAY

## 2024-04-16 PROCEDURE — 2580000003 HC RX 258: Performed by: INTERNAL MEDICINE

## 2024-04-16 PROCEDURE — 85025 COMPLETE CBC W/AUTO DIFF WBC: CPT

## 2024-04-16 PROCEDURE — 6370000000 HC RX 637 (ALT 250 FOR IP): Performed by: GENERAL ACUTE CARE HOSPITAL

## 2024-04-16 PROCEDURE — 6370000000 HC RX 637 (ALT 250 FOR IP): Performed by: STUDENT IN AN ORGANIZED HEALTH CARE EDUCATION/TRAINING PROGRAM

## 2024-04-16 PROCEDURE — 99233 SBSQ HOSP IP/OBS HIGH 50: CPT | Performed by: INTERNAL MEDICINE

## 2024-04-16 PROCEDURE — 85014 HEMATOCRIT: CPT

## 2024-04-16 PROCEDURE — 36415 COLL VENOUS BLD VENIPUNCTURE: CPT

## 2024-04-16 PROCEDURE — 92610 EVALUATE SWALLOWING FUNCTION: CPT

## 2024-04-16 PROCEDURE — 6360000004 HC RX CONTRAST MEDICATION: Performed by: NURSE PRACTITIONER

## 2024-04-16 PROCEDURE — 85018 HEMOGLOBIN: CPT

## 2024-04-16 PROCEDURE — 6360000002 HC RX W HCPCS: Performed by: INTERNAL MEDICINE

## 2024-04-16 PROCEDURE — 36430 TRANSFUSION BLD/BLD COMPNT: CPT

## 2024-04-16 PROCEDURE — 6370000000 HC RX 637 (ALT 250 FOR IP): Performed by: NURSE PRACTITIONER

## 2024-04-16 RX ORDER — SODIUM CHLORIDE 9 MG/ML
INJECTION, SOLUTION INTRAVENOUS PRN
Status: DISCONTINUED | OUTPATIENT
Start: 2024-04-16 | End: 2024-04-20

## 2024-04-16 RX ADMIN — DIVALPROEX SODIUM 250 MG: 250 TABLET, DELAYED RELEASE ORAL at 09:19

## 2024-04-16 RX ADMIN — POTASSIUM CHLORIDE 20 MEQ: 750 TABLET, FILM COATED, EXTENDED RELEASE ORAL at 09:19

## 2024-04-16 RX ADMIN — PANTOPRAZOLE SODIUM 40 MG: 40 TABLET, DELAYED RELEASE ORAL at 06:08

## 2024-04-16 RX ADMIN — DIVALPROEX SODIUM 250 MG: 250 TABLET, DELAYED RELEASE ORAL at 22:14

## 2024-04-16 RX ADMIN — FUROSEMIDE 20 MG: 10 INJECTION, SOLUTION INTRAMUSCULAR; INTRAVENOUS at 17:41

## 2024-04-16 RX ADMIN — DIAZEPAM 1 MG: 2 TABLET ORAL at 22:15

## 2024-04-16 RX ADMIN — ATORVASTATIN CALCIUM 20 MG: 20 TABLET, FILM COATED ORAL at 09:18

## 2024-04-16 RX ADMIN — METOPROLOL TARTRATE 12.5 MG: 25 TABLET, FILM COATED ORAL at 09:16

## 2024-04-16 RX ADMIN — SODIUM CHLORIDE, PRESERVATIVE FREE 10 ML: 5 INJECTION INTRAVENOUS at 09:24

## 2024-04-16 RX ADMIN — ACETAMINOPHEN 650 MG: 650 SUPPOSITORY RECTAL at 22:17

## 2024-04-16 RX ADMIN — SUCRALFATE 1 G: 1 TABLET ORAL at 12:47

## 2024-04-16 RX ADMIN — SODIUM CHLORIDE: 9 INJECTION, SOLUTION INTRAVENOUS at 03:24

## 2024-04-16 RX ADMIN — PANTOPRAZOLE SODIUM 40 MG: 40 TABLET, DELAYED RELEASE ORAL at 17:41

## 2024-04-16 RX ADMIN — METOPROLOL TARTRATE 12.5 MG: 25 TABLET, FILM COATED ORAL at 22:15

## 2024-04-16 RX ADMIN — SUCRALFATE 1 G: 1 TABLET ORAL at 17:40

## 2024-04-16 RX ADMIN — SUCRALFATE 1 G: 1 TABLET ORAL at 22:14

## 2024-04-16 RX ADMIN — PIPERACILLIN AND TAZOBACTAM 3375 MG: 3; .375 INJECTION, POWDER, LYOPHILIZED, FOR SOLUTION INTRAVENOUS at 05:04

## 2024-04-16 RX ADMIN — IOPAMIDOL 100 ML: 755 INJECTION, SOLUTION INTRAVENOUS at 15:54

## 2024-04-16 RX ADMIN — SODIUM CHLORIDE, PRESERVATIVE FREE 10 ML: 5 INJECTION INTRAVENOUS at 22:17

## 2024-04-16 RX ADMIN — ACETAMINOPHEN 650 MG: 650 SUPPOSITORY RECTAL at 10:56

## 2024-04-16 RX ADMIN — VANCOMYCIN HYDROCHLORIDE 750 MG: 750 INJECTION, POWDER, LYOPHILIZED, FOR SOLUTION INTRAVENOUS at 03:23

## 2024-04-16 RX ADMIN — MIRTAZAPINE 15 MG: 15 TABLET, FILM COATED ORAL at 22:14

## 2024-04-16 RX ADMIN — SUCRALFATE 1 G: 1 TABLET ORAL at 06:10

## 2024-04-16 ASSESSMENT — PAIN SCALES - GENERAL
PAINLEVEL_OUTOF10: 0
PAINLEVEL_OUTOF10: 0

## 2024-04-16 NOTE — OP NOTE
PILL CAM:  [id number] dictation    Pill Cam ingested successfully and capsule remained in the stomach for the duration of 8 hour battery life  There was ingestion of oral contents after 7 hrs in the stomach that prevented visualization in the final hour in stomach.  No blood seen in stomach.  Plan:  -will need a repeat attempt at capsule endoscopy via EGD/Endoscopic placement

## 2024-04-17 ENCOUNTER — ANESTHESIA (OUTPATIENT)
Facility: HOSPITAL | Age: 69
DRG: 853 | End: 2024-04-17
Payer: COMMERCIAL

## 2024-04-17 ENCOUNTER — ANESTHESIA EVENT (OUTPATIENT)
Facility: HOSPITAL | Age: 69
DRG: 853 | End: 2024-04-17
Payer: COMMERCIAL

## 2024-04-17 ENCOUNTER — APPOINTMENT (OUTPATIENT)
Facility: HOSPITAL | Age: 69
DRG: 853 | End: 2024-04-17
Payer: COMMERCIAL

## 2024-04-17 ENCOUNTER — ANESTHESIA EVENT (OUTPATIENT)
Facility: HOSPITAL | Age: 69
End: 2024-04-17
Payer: MEDICARE

## 2024-04-17 ENCOUNTER — ANESTHESIA (OUTPATIENT)
Facility: HOSPITAL | Age: 69
End: 2024-04-17
Payer: MEDICARE

## 2024-04-17 LAB
ABO + RH BLD: NORMAL
ANION GAP SERPL CALC-SCNC: 7 MMOL/L (ref 5–15)
ANTIGENS PRESENT RBC DONR: NORMAL
BACTERIA SPEC CULT: NORMAL
BACTERIA SPEC CULT: NORMAL
BLD GP AB SCN SERPL QL ELUTION: NORMAL
BLD PROD TYP BPU: NORMAL
BLOOD BANK BLOOD PRODUCT EXPIRATION DATE: NORMAL
BLOOD BANK BLOOD PRODUCT EXPIRATION DATE: NORMAL
BLOOD BANK CMNT PATIENT-IMP: NORMAL
BLOOD BANK DISPENSE STATUS: NORMAL
BLOOD BANK ISBT PRODUCT BLOOD TYPE: 5100
BLOOD BANK ISBT PRODUCT BLOOD TYPE: 6200
BLOOD BANK UNIT TYPE AND RH: NORMAL
BLOOD BANK UNIT TYPE AND RH: NORMAL
BLOOD GROUP ANTIBODIES SERPL: NORMAL
BLOOD GROUP ANTIBODIES SERPL: NORMAL
BPU ID: NORMAL
BUN SERPL-MCNC: 13 MG/DL (ref 6–20)
BUN/CREAT SERPL: 15 (ref 12–20)
CALCIUM SERPL-MCNC: 9.5 MG/DL (ref 8.5–10.1)
CHLORIDE SERPL-SCNC: 98 MMOL/L (ref 97–108)
CO2 SERPL-SCNC: 27 MMOL/L (ref 21–32)
CREAT SERPL-MCNC: 0.88 MG/DL (ref 0.55–1.02)
CROSSMATCH RESULT: NORMAL
CRP SERPL-MCNC: 16.7 MG/DL (ref 0–0.3)
DAT POLY-SP REAG RBC QL: NORMAL
ERYTHROCYTE [DISTWIDTH] IN BLOOD BY AUTOMATED COUNT: 17.6 % (ref 11.5–14.5)
ERYTHROCYTE [SEDIMENTATION RATE] IN BLOOD: 127 MM/HR (ref 0–30)
FUNGITELL INTERPRETATION: NORMAL
FUNGITELL: <31.25 PG/ML
GLUCOSE SERPL-MCNC: 122 MG/DL (ref 65–100)
HCT VFR BLD AUTO: 30.8 % (ref 35–47)
HGB BLD-MCNC: 9.7 G/DL (ref 11.5–16)
Lab: NORMAL
MAGNESIUM SERPL-MCNC: 1.8 MG/DL (ref 1.6–2.4)
MCH RBC QN AUTO: 29 PG (ref 26–34)
MCHC RBC AUTO-ENTMCNC: 31.5 G/DL (ref 30–36.5)
MCV RBC AUTO: 92.2 FL (ref 80–99)
NRBC # BLD: 0 K/UL (ref 0–0.01)
NRBC BLD-RTO: 0 PER 100 WBC
PHOSPHATE SERPL-MCNC: 2.6 MG/DL (ref 2.6–4.7)
PLATELET # BLD AUTO: 531 K/UL (ref 150–400)
PMV BLD AUTO: 10.2 FL (ref 8.9–12.9)
POTASSIUM SERPL-SCNC: 3.7 MMOL/L (ref 3.5–5.1)
PROCALCITONIN SERPL-MCNC: 0.6 NG/ML
RBC # BLD AUTO: 3.34 M/UL (ref 3.8–5.2)
REFERENCE VALUE: NORMAL
RESULT: NEGATIVE
SERVICE CMNT-IMP: NORMAL
SERVICE CMNT-IMP: NORMAL
SODIUM SERPL-SCNC: 132 MMOL/L (ref 136–145)
SODIUM SERPL-SCNC: 133 MMOL/L (ref 136–145)
SODIUM SERPL-SCNC: 133 MMOL/L (ref 136–145)
SPECIMEN EXP DATE BLD: NORMAL
UNIT DIVISION: 0
UNIT ISSUE DATE/TIME: NORMAL
UNIT ISSUE DATE/TIME: NORMAL
VANCOMYCIN SERPL-MCNC: 11.9 UG/ML
WBC # BLD AUTO: 11 K/UL (ref 3.6–11)

## 2024-04-17 PROCEDURE — 2709999900 HC NON-CHARGEABLE SUPPLY: Performed by: SPECIALIST

## 2024-04-17 PROCEDURE — 3600007502: Performed by: SPECIALIST

## 2024-04-17 PROCEDURE — 2500000003 HC RX 250 WO HCPCS: Performed by: ANESTHESIOLOGY

## 2024-04-17 PROCEDURE — 2700000000 HC OXYGEN THERAPY PER DAY

## 2024-04-17 PROCEDURE — 0DJ08ZZ INSPECTION OF UPPER INTESTINAL TRACT, VIA NATURAL OR ARTIFICIAL OPENING ENDOSCOPIC: ICD-10-PCS | Performed by: SPECIALIST

## 2024-04-17 PROCEDURE — 36556 INSERT NON-TUNNEL CV CATH: CPT

## 2024-04-17 PROCEDURE — 2720000010 HC SURG SUPPLY STERILE: Performed by: SPECIALIST

## 2024-04-17 PROCEDURE — 6360000002 HC RX W HCPCS: Performed by: INTERNAL MEDICINE

## 2024-04-17 PROCEDURE — 84100 ASSAY OF PHOSPHORUS: CPT

## 2024-04-17 PROCEDURE — 6370000000 HC RX 637 (ALT 250 FOR IP): Performed by: STUDENT IN AN ORGANIZED HEALTH CARE EDUCATION/TRAINING PROGRAM

## 2024-04-17 PROCEDURE — 85027 COMPLETE CBC AUTOMATED: CPT

## 2024-04-17 PROCEDURE — 2580000003 HC RX 258: Performed by: INTERNAL MEDICINE

## 2024-04-17 PROCEDURE — 83735 ASSAY OF MAGNESIUM: CPT

## 2024-04-17 PROCEDURE — 2580000003 HC RX 258: Performed by: SPECIALIST

## 2024-04-17 PROCEDURE — 84295 ASSAY OF SERUM SODIUM: CPT

## 2024-04-17 PROCEDURE — 84145 PROCALCITONIN (PCT): CPT

## 2024-04-17 PROCEDURE — 6360000002 HC RX W HCPCS: Performed by: ANESTHESIOLOGY

## 2024-04-17 PROCEDURE — 80202 ASSAY OF VANCOMYCIN: CPT

## 2024-04-17 PROCEDURE — 85652 RBC SED RATE AUTOMATED: CPT

## 2024-04-17 PROCEDURE — 7100000010 HC PHASE II RECOVERY - FIRST 15 MIN: Performed by: SPECIALIST

## 2024-04-17 PROCEDURE — 3700000001 HC ADD 15 MINUTES (ANESTHESIA): Performed by: SPECIALIST

## 2024-04-17 PROCEDURE — 6370000000 HC RX 637 (ALT 250 FOR IP): Performed by: INTERNAL MEDICINE

## 2024-04-17 PROCEDURE — 6370000000 HC RX 637 (ALT 250 FOR IP): Performed by: NURSE PRACTITIONER

## 2024-04-17 PROCEDURE — 6360000002 HC RX W HCPCS: Performed by: STUDENT IN AN ORGANIZED HEALTH CARE EDUCATION/TRAINING PROGRAM

## 2024-04-17 PROCEDURE — 99233 SBSQ HOSP IP/OBS HIGH 50: CPT | Performed by: INTERNAL MEDICINE

## 2024-04-17 PROCEDURE — 87305 ASPERGILLUS AG IA: CPT

## 2024-04-17 PROCEDURE — 2580000003 HC RX 258: Performed by: STUDENT IN AN ORGANIZED HEALTH CARE EDUCATION/TRAINING PROGRAM

## 2024-04-17 PROCEDURE — 80048 BASIC METABOLIC PNL TOTAL CA: CPT

## 2024-04-17 PROCEDURE — 3600007512: Performed by: SPECIALIST

## 2024-04-17 PROCEDURE — 36415 COLL VENOUS BLD VENIPUNCTURE: CPT

## 2024-04-17 PROCEDURE — 6370000000 HC RX 637 (ALT 250 FOR IP): Performed by: PHYSICIAN ASSISTANT

## 2024-04-17 PROCEDURE — 2060000000 HC ICU INTERMEDIATE R&B

## 2024-04-17 PROCEDURE — 3700000000 HC ANESTHESIA ATTENDED CARE: Performed by: SPECIALIST

## 2024-04-17 PROCEDURE — 86606 ASPERGILLUS ANTIBODY: CPT

## 2024-04-17 PROCEDURE — 86140 C-REACTIVE PROTEIN: CPT

## 2024-04-17 PROCEDURE — 71045 X-RAY EXAM CHEST 1 VIEW: CPT

## 2024-04-17 RX ORDER — DIPHENHYDRAMINE HCL 25 MG
25 CAPSULE ORAL EVERY 6 HOURS
Status: DISCONTINUED | OUTPATIENT
Start: 2024-04-17 | End: 2024-04-18

## 2024-04-17 RX ORDER — SODIUM CHLORIDE 0.9 % (FLUSH) 0.9 %
5-40 SYRINGE (ML) INJECTION EVERY 12 HOURS SCHEDULED
Status: DISCONTINUED | OUTPATIENT
Start: 2024-04-17 | End: 2024-04-17 | Stop reason: HOSPADM

## 2024-04-17 RX ORDER — SODIUM CHLORIDE 0.9 % (FLUSH) 0.9 %
5-40 SYRINGE (ML) INJECTION PRN
Status: DISCONTINUED | OUTPATIENT
Start: 2024-04-17 | End: 2024-04-17 | Stop reason: HOSPADM

## 2024-04-17 RX ORDER — DIPHENHYDRAMINE HYDROCHLORIDE 50 MG/ML
25 INJECTION INTRAMUSCULAR; INTRAVENOUS ONCE
Status: COMPLETED | OUTPATIENT
Start: 2024-04-17 | End: 2024-04-17

## 2024-04-17 RX ORDER — LIDOCAINE HYDROCHLORIDE 20 MG/ML
INJECTION, SOLUTION EPIDURAL; INFILTRATION; INTRACAUDAL; PERINEURAL PRN
Status: DISCONTINUED | OUTPATIENT
Start: 2024-04-17 | End: 2024-04-17 | Stop reason: SDUPTHER

## 2024-04-17 RX ORDER — PREDNISONE 20 MG/1
40 TABLET ORAL DAILY
Status: DISCONTINUED | OUTPATIENT
Start: 2024-04-17 | End: 2024-04-17

## 2024-04-17 RX ORDER — SODIUM CHLORIDE 9 MG/ML
25 INJECTION, SOLUTION INTRAVENOUS PRN
Status: DISCONTINUED | OUTPATIENT
Start: 2024-04-17 | End: 2024-04-17 | Stop reason: HOSPADM

## 2024-04-17 RX ADMIN — SODIUM CHLORIDE, PRESERVATIVE FREE 10 ML: 5 INJECTION INTRAVENOUS at 10:06

## 2024-04-17 RX ADMIN — MIRTAZAPINE 15 MG: 15 TABLET, FILM COATED ORAL at 22:12

## 2024-04-17 RX ADMIN — METOPROLOL TARTRATE 12.5 MG: 25 TABLET, FILM COATED ORAL at 22:11

## 2024-04-17 RX ADMIN — METOPROLOL TARTRATE 12.5 MG: 25 TABLET, FILM COATED ORAL at 09:59

## 2024-04-17 RX ADMIN — PROPOFOL 80 MG: 10 INJECTION, EMULSION INTRAVENOUS at 14:41

## 2024-04-17 RX ADMIN — DIPHENHYDRAMINE HYDROCHLORIDE 25 MG: 25 CAPSULE ORAL at 22:12

## 2024-04-17 RX ADMIN — DIPHENHYDRAMINE HYDROCHLORIDE 25 MG: 50 INJECTION INTRAMUSCULAR; INTRAVENOUS at 16:27

## 2024-04-17 RX ADMIN — DIVALPROEX SODIUM 250 MG: 250 TABLET, DELAYED RELEASE ORAL at 09:59

## 2024-04-17 RX ADMIN — SUCRALFATE 1 G: 1 TABLET ORAL at 22:11

## 2024-04-17 RX ADMIN — FUROSEMIDE 20 MG: 10 INJECTION, SOLUTION INTRAMUSCULAR; INTRAVENOUS at 09:59

## 2024-04-17 RX ADMIN — METHYLPREDNISOLONE SODIUM SUCCINATE 40 MG: 40 INJECTION INTRAMUSCULAR; INTRAVENOUS at 17:31

## 2024-04-17 RX ADMIN — PANTOPRAZOLE SODIUM 40 MG: 40 TABLET, DELAYED RELEASE ORAL at 05:40

## 2024-04-17 RX ADMIN — SODIUM CHLORIDE, PRESERVATIVE FREE 10 ML: 5 INJECTION INTRAVENOUS at 22:13

## 2024-04-17 RX ADMIN — SODIUM CHLORIDE 25 ML: 9 INJECTION, SOLUTION INTRAVENOUS at 14:25

## 2024-04-17 RX ADMIN — DIVALPROEX SODIUM 250 MG: 250 TABLET, DELAYED RELEASE ORAL at 22:11

## 2024-04-17 RX ADMIN — SUCRALFATE 1 G: 1 TABLET ORAL at 05:40

## 2024-04-17 RX ADMIN — LIDOCAINE HYDROCHLORIDE 60 MG: 20 INJECTION, SOLUTION EPIDURAL; INFILTRATION; INTRACAUDAL; PERINEURAL at 14:23

## 2024-04-17 RX ADMIN — DIAZEPAM 1 MG: 2 TABLET ORAL at 22:12

## 2024-04-17 RX ADMIN — FUROSEMIDE 20 MG: 10 INJECTION, SOLUTION INTRAMUSCULAR; INTRAVENOUS at 16:27

## 2024-04-17 ASSESSMENT — PAIN - FUNCTIONAL ASSESSMENT: PAIN_FUNCTIONAL_ASSESSMENT: NONE - DENIES PAIN

## 2024-04-17 ASSESSMENT — PAIN SCALES - GENERAL
PAINLEVEL_OUTOF10: 0

## 2024-04-17 ASSESSMENT — LIFESTYLE VARIABLES: SMOKING_STATUS: 1

## 2024-04-17 NOTE — PROCEDURES
Dr Dillard at bedside to evaluate rash.  Orders to send back upstairs and he will order benadryl for the primary nurse to give upstairs.

## 2024-04-17 NOTE — OP NOTE
[facility]              [address] [place] [address]                            OPERATIVE REPORT      PATIENT NAME: MARY SÁNCHEZ                 : 1955  MED REC NO: [id number]                       ROOM: 2301  ACCOUNT NO: [id number]                       ADMIT DATE: 2024  PROVIDER: Galileo Medeiros MD    DATE OF SERVICE:  2024    PREOPERATIVE DIAGNOSES:  Gastrointestinal bleeding.    POSTOPERATIVE DIAGNOSES:  Gastrointestinal bleeding.    PROCEDURES PERFORMED:  Small bowel PillCam.    SURGEON:  Galileo Medeiros MD    ASSISTANT:  n/a    ANESTHESIA:  Sedation:  None.    ESTIMATED BLOOD LOSS:  None.    SPECIMENS REMOVED:  None.    INTRAOPERATIVE FINDINGS:  no SB images obtained on study     COMPLICATIONS:  None.    IMPLANTS:  None.    INDICATIONS:  A 68-year-old woman with recent melena, rule out small-bowel bleeding, recently identified small-bowel AVMs.    DESCRIPTION OF PROCEDURE:  Following the risks, benefits, and alternatives being discussed in detail with the patient's power of , the patient ingested the capsule in the typical fashion.  It entered this esophagus and rapidly entered the stomach.  The capsule; however, remained in the stomach for the duration of this entire PillCam study about eight hours, at which time, no further images were achieved.    At 7:00 hours and 10 minutes, ingested food contents were seen in the stomach, which obscured visualization.  No blood was seen in the stomach.  No black material or red blood.  This is therefore likely a bleed distal to stomach.    RECOMMENDATIONS:  The patient will require EGD with PillCam placement due to retained PillCam in for a duration of eight hours today.        GALILEO MEDEIROS MD      MSM/AQS  D:  2024 14:10:11  T:  2024 22:15:40  JOB #:  [id number]

## 2024-04-17 NOTE — ANESTHESIA POSTPROCEDURE EVALUATION
Department of Anesthesiology  Postprocedure Note    Patient: Lillie Claire  MRN: [id number]  YOB: 1955  Date of evaluation: 4/17/2024    Procedure Summary       Date: 04/17/24 Room / Location: [facility] ENDO 01 / [facility] ENDOSCOPY    Anesthesia Start: 1411 Anesthesia Stop: 1444    Procedures:       ESOPHAGOGASTRODUODENOSCOPY      ESOPHAGEAL CAPSULE ENDOSCOPY Diagnosis:       Anemia, unspecified type      (Anemia, unspecified type [D64.9])    Surgeons: Maximiliano Alberts MD Responsible Provider: Cely Coles DO    Anesthesia Type: MAC ASA Status: 3            Anesthesia Type: MAC    Nagi Phase I: Nagi Score: 10    Nagi Phase II: Nagi Score: 10    Anesthesia Post Evaluation    Patient location during evaluation: PACU  Patient participation: complete - patient participated  Level of consciousness: awake  Airway patency: patent  Nausea & Vomiting: no vomiting and no nausea  Cardiovascular status: hemodynamically stable  Respiratory status: acceptable  Hydration status: euvolemic    No notable events documented.

## 2024-04-17 NOTE — OP NOTE
Esophagogastroduodenoscopy Procedure Note      Lillie Claire  1955  [id number]    Indication:  Melena      Endoscopist: Maximiliano Alberts MD    Referring Provider:  Husam Foster APRN - NP    Sedation:  MAC anesthesia Propofol    Procedure Details:  After infomed consent was obtained for the procedure, with all risks and benefits of procedure explained the patient was taken to the endoscopy suite and placed in the left lateral decubitus position.  Following sequential administration of sedation as per above, the endoscope was inserted into the mouth and advanced under direct vision to second portion of the duodenum.  A careful inspection was made as the gastroscope was withdrawn, including a retroflexed view of the proximal stomach; findings and interventions are described below.      Findings:     Esophagus:   - Normal mucosa throughout esophagus.     Stomach:   +          Normal gastric mucosa. There was no blood in stomach. No AVMs.     Duodenum:   ++ (2) clips seen in the medial wall of the second portion of the duodenum without any bleeding/blood seen nearby.  No other findings in the Duodenum to the junction of 3rd portion.    Scope removed and then activated pillcam was inserted into the capsule delivery device.  The scope reinserted with capsule delivery device on tip and passed to duodenal bulb and was released there.      Therapies:  see above    Specimen: none            Complications:   None were encountered during the procedure.    EBL:  None.          Recommendations:   -will await data to be downloaded tomorrow to allow for interpretation of images      Maximiliano Alberts MD  4/17/2024  2:41 PM

## 2024-04-17 NOTE — ANESTHESIA PRE PROCEDURE
Department of Anesthesiology  Preprocedure Note       Name:  Lillie Claire   Age:  68 y.o.  :  1955                                          MRN:  [id number]         Date:  2024      Surgeon: Surgeon(s):  Maximiliano Alberts MD    Procedure: Procedure(s):  ESOPHAGOGASTRODUODENOSCOPY  ESOPHAGEAL CAPSULE ENDOSCOPY    Medications prior to admission:   Prior to Admission medications    Medication Sig Start Date End Date Taking? Authorizing Provider   aspirin 81 MG EC tablet Take 1 tablet by mouth daily   Yes ProviderLindy MD   atorvastatin (LIPITOR) 20 MG tablet Take 1 tablet by mouth daily   Yes Provider, MD Lindy   senna-docusate (PERICOLACE) 8.6-50 MG per tablet Take 1 tablet by mouth daily   Yes ProviderLindy MD   metoprolol tartrate (LOPRESSOR) 25 MG tablet Take 1 tablet by mouth 2 times daily   Yes Provider, MD Lindy   dextromethorphan-quiNIDine (NUEDEXTA) 20-10 MG CAPS per capsule Take 1 capsule by mouth in the morning and at bedtime   Yes ProviderLindy MD   divalproex (DEPAKOTE) 250 MG DR tablet Take 1 tablet by mouth in the morning and at bedtime   Yes Provider, MD Lnidy   mirtazapine (REMERON) 15 MG tablet Take 1 tablet by mouth nightly   Yes Provider, MD Lindy   diazePAM (VALIUM) 2 MG tablet Take 0.5 tablets by mouth nightly.   Yes Provider, MD Lindy       Current medications:    Current Facility-Administered Medications   Medication Dose Route Frequency Provider Last Rate Last Admin   • Vancomycin Level   Other RX Placeholder Tory Garcia MD       • 0.9 % sodium chloride infusion   IntraVENous PRN Mendel, David L, MD       • 0.9 % sodium chloride infusion   IntraVENous PRN Chan Diez, APRN - CNP       • sucralfate (CARAFATE) tablet 1 g  1 g Oral 4x Daily AC & HS Marita Capone APRN - NP   1 g at 24 0540   • magic (miracle) mouthwash  5 mL Swish & Spit 4x Daily PRN Zabrina Stewart MD       • Vancomycin Level   Other

## 2024-04-18 ENCOUNTER — APPOINTMENT (OUTPATIENT)
Facility: HOSPITAL | Age: 69
DRG: 853 | End: 2024-04-18
Payer: COMMERCIAL

## 2024-04-18 PROBLEM — C34.31 MALIGNANT NEOPLASM OF LOWER LOBE OF RIGHT LUNG (HCC): Status: ACTIVE | Noted: 2024-04-18

## 2024-04-18 LAB
ANION GAP SERPL CALC-SCNC: 4 MMOL/L (ref 5–15)
BUN SERPL-MCNC: 15 MG/DL (ref 6–20)
BUN/CREAT SERPL: 18 (ref 12–20)
CALCIUM SERPL-MCNC: 9 MG/DL (ref 8.5–10.1)
CHLORIDE SERPL-SCNC: 99 MMOL/L (ref 97–108)
CO2 SERPL-SCNC: 30 MMOL/L (ref 21–32)
CREAT SERPL-MCNC: 0.82 MG/DL (ref 0.55–1.02)
GLUCOSE SERPL-MCNC: 149 MG/DL (ref 65–100)
MAGNESIUM SERPL-MCNC: 1.7 MG/DL (ref 1.6–2.4)
PHOSPHATE SERPL-MCNC: 4.8 MG/DL (ref 2.6–4.7)
POTASSIUM SERPL-SCNC: 3.7 MMOL/L (ref 3.5–5.1)
SODIUM SERPL-SCNC: 132 MMOL/L (ref 136–145)
SODIUM SERPL-SCNC: 133 MMOL/L (ref 136–145)

## 2024-04-18 PROCEDURE — 88305 TISSUE EXAM BY PATHOLOGIST: CPT

## 2024-04-18 PROCEDURE — 6370000000 HC RX 637 (ALT 250 FOR IP): Performed by: STUDENT IN AN ORGANIZED HEALTH CARE EDUCATION/TRAINING PROGRAM

## 2024-04-18 PROCEDURE — 6370000000 HC RX 637 (ALT 250 FOR IP): Performed by: INTERNAL MEDICINE

## 2024-04-18 PROCEDURE — 88360 TUMOR IMMUNOHISTOCHEM/MANUAL: CPT

## 2024-04-18 PROCEDURE — 2060000000 HC ICU INTERMEDIATE R&B

## 2024-04-18 PROCEDURE — 30233N1 TRANSFUSION OF NONAUTOLOGOUS RED BLOOD CELLS INTO PERIPHERAL VEIN, PERCUTANEOUS APPROACH: ICD-10-PCS | Performed by: STUDENT IN AN ORGANIZED HEALTH CARE EDUCATION/TRAINING PROGRAM

## 2024-04-18 PROCEDURE — 6360000002 HC RX W HCPCS: Performed by: NURSE PRACTITIONER

## 2024-04-18 PROCEDURE — 2500000003 HC RX 250 WO HCPCS

## 2024-04-18 PROCEDURE — 88333 PATH CONSLTJ SURG CYTO XM 1: CPT

## 2024-04-18 PROCEDURE — 88377 M/PHMTRC ALYS ISHQUANT/SEMIQ: CPT

## 2024-04-18 PROCEDURE — 84100 ASSAY OF PHOSPHORUS: CPT

## 2024-04-18 PROCEDURE — 2709999900 US BIOPSY LYMPH NODE

## 2024-04-18 PROCEDURE — 88184 FLOWCYTOMETRY/ TC 1 MARKER: CPT

## 2024-04-18 PROCEDURE — 84295 ASSAY OF SERUM SODIUM: CPT

## 2024-04-18 PROCEDURE — 2580000003 HC RX 258: Performed by: INTERNAL MEDICINE

## 2024-04-18 PROCEDURE — 88341 IMHCHEM/IMCYTCHM EA ADD ANTB: CPT

## 2024-04-18 PROCEDURE — 99232 SBSQ HOSP IP/OBS MODERATE 35: CPT | Performed by: INTERNAL MEDICINE

## 2024-04-18 PROCEDURE — 88185 FLOWCYTOMETRY/TC ADD-ON: CPT

## 2024-04-18 PROCEDURE — 6370000000 HC RX 637 (ALT 250 FOR IP): Performed by: NURSE PRACTITIONER

## 2024-04-18 PROCEDURE — 88342 IMHCHEM/IMCYTCHM 1ST ANTB: CPT

## 2024-04-18 PROCEDURE — 6360000002 HC RX W HCPCS: Performed by: INTERNAL MEDICINE

## 2024-04-18 PROCEDURE — 07B73ZX EXCISION OF THORAX LYMPHATIC, PERCUTANEOUS APPROACH, DIAGNOSTIC: ICD-10-PCS | Performed by: STUDENT IN AN ORGANIZED HEALTH CARE EDUCATION/TRAINING PROGRAM

## 2024-04-18 PROCEDURE — 6370000000 HC RX 637 (ALT 250 FOR IP): Performed by: GENERAL ACUTE CARE HOSPITAL

## 2024-04-18 PROCEDURE — 36415 COLL VENOUS BLD VENIPUNCTURE: CPT

## 2024-04-18 PROCEDURE — 80048 BASIC METABOLIC PNL TOTAL CA: CPT

## 2024-04-18 PROCEDURE — 0DJ07ZZ INSPECTION OF UPPER INTESTINAL TRACT, VIA NATURAL OR ARTIFICIAL OPENING: ICD-10-PCS | Performed by: SPECIALIST

## 2024-04-18 PROCEDURE — 6370000000 HC RX 637 (ALT 250 FOR IP): Performed by: PHYSICIAN ASSISTANT

## 2024-04-18 PROCEDURE — 83735 ASSAY OF MAGNESIUM: CPT

## 2024-04-18 RX ORDER — LIDOCAINE HYDROCHLORIDE 10 MG/ML
10 INJECTION, SOLUTION EPIDURAL; INFILTRATION; INTRACAUDAL; PERINEURAL ONCE
Status: COMPLETED | OUTPATIENT
Start: 2024-04-18 | End: 2024-04-18

## 2024-04-18 RX ORDER — OCTREOTIDE ACETATE 50 UG/ML
50 INJECTION, SOLUTION INTRAVENOUS; SUBCUTANEOUS EVERY 12 HOURS
Status: DISCONTINUED | OUTPATIENT
Start: 2024-04-18 | End: 2024-05-07 | Stop reason: HOSPADM

## 2024-04-18 RX ORDER — DIPHENHYDRAMINE HCL 25 MG
25 CAPSULE ORAL EVERY 6 HOURS PRN
Status: DISCONTINUED | OUTPATIENT
Start: 2024-04-18 | End: 2024-05-07 | Stop reason: HOSPADM

## 2024-04-18 RX ORDER — PREDNISONE 20 MG/1
40 TABLET ORAL DAILY
Status: COMPLETED | OUTPATIENT
Start: 2024-04-18 | End: 2024-04-22

## 2024-04-18 RX ADMIN — FUROSEMIDE 20 MG: 10 INJECTION, SOLUTION INTRAMUSCULAR; INTRAVENOUS at 16:35

## 2024-04-18 RX ADMIN — SUCRALFATE 1 G: 1 TABLET ORAL at 05:18

## 2024-04-18 RX ADMIN — PANTOPRAZOLE SODIUM 40 MG: 40 TABLET, DELAYED RELEASE ORAL at 05:18

## 2024-04-18 RX ADMIN — SODIUM CHLORIDE, PRESERVATIVE FREE 10 ML: 5 INJECTION INTRAVENOUS at 12:12

## 2024-04-18 RX ADMIN — PANTOPRAZOLE SODIUM 40 MG: 40 TABLET, DELAYED RELEASE ORAL at 16:35

## 2024-04-18 RX ADMIN — SUCRALFATE 1 G: 1 TABLET ORAL at 16:34

## 2024-04-18 RX ADMIN — METOPROLOL TARTRATE 12.5 MG: 25 TABLET, FILM COATED ORAL at 20:44

## 2024-04-18 RX ADMIN — OCTREOTIDE ACETATE 50 MCG: 50 INJECTION, SOLUTION INTRAVENOUS; SUBCUTANEOUS at 20:52

## 2024-04-18 RX ADMIN — FUROSEMIDE 20 MG: 10 INJECTION, SOLUTION INTRAMUSCULAR; INTRAVENOUS at 09:50

## 2024-04-18 RX ADMIN — LIDOCAINE HYDROCHLORIDE 10 ML: 10 INJECTION, SOLUTION EPIDURAL; INFILTRATION; INTRACAUDAL; PERINEURAL at 08:54

## 2024-04-18 RX ADMIN — METOPROLOL TARTRATE 12.5 MG: 25 TABLET, FILM COATED ORAL at 09:47

## 2024-04-18 RX ADMIN — DIVALPROEX SODIUM 250 MG: 250 TABLET, DELAYED RELEASE ORAL at 09:47

## 2024-04-18 RX ADMIN — DIVALPROEX SODIUM 250 MG: 250 TABLET, DELAYED RELEASE ORAL at 20:44

## 2024-04-18 RX ADMIN — DIPHENHYDRAMINE HYDROCHLORIDE 25 MG: 25 CAPSULE ORAL at 01:08

## 2024-04-18 RX ADMIN — PREDNISONE 40 MG: 20 TABLET ORAL at 09:47

## 2024-04-18 RX ADMIN — MIRTAZAPINE 15 MG: 15 TABLET, FILM COATED ORAL at 20:44

## 2024-04-18 RX ADMIN — SODIUM CHLORIDE, PRESERVATIVE FREE 10 ML: 5 INJECTION INTRAVENOUS at 20:45

## 2024-04-18 RX ADMIN — SUCRALFATE 1 G: 1 TABLET ORAL at 12:12

## 2024-04-18 RX ADMIN — SUCRALFATE 1 G: 1 TABLET ORAL at 20:43

## 2024-04-18 RX ADMIN — POTASSIUM CHLORIDE 20 MEQ: 750 TABLET, FILM COATED, EXTENDED RELEASE ORAL at 09:47

## 2024-04-18 RX ADMIN — DIAZEPAM 1 MG: 2 TABLET ORAL at 20:43

## 2024-04-18 RX ADMIN — ATORVASTATIN CALCIUM 20 MG: 20 TABLET, FILM COATED ORAL at 09:47

## 2024-04-18 ASSESSMENT — PAIN SCALES - GENERAL
PAINLEVEL_OUTOF10: 0

## 2024-04-19 LAB
ANION GAP SERPL CALC-SCNC: 4 MMOL/L (ref 5–15)
BUN SERPL-MCNC: 20 MG/DL (ref 6–20)
BUN/CREAT SERPL: 22 (ref 12–20)
CALCIUM SERPL-MCNC: 8.9 MG/DL (ref 8.5–10.1)
CHLORIDE SERPL-SCNC: 96 MMOL/L (ref 97–108)
CO2 SERPL-SCNC: 31 MMOL/L (ref 21–32)
CREAT SERPL-MCNC: 0.9 MG/DL (ref 0.55–1.02)
ERYTHROCYTE [DISTWIDTH] IN BLOOD BY AUTOMATED COUNT: 17.3 % (ref 11.5–14.5)
GLUCOSE SERPL-MCNC: 219 MG/DL (ref 65–100)
HCT VFR BLD AUTO: 25.2 % (ref 35–47)
HGB BLD-MCNC: 8 G/DL (ref 11.5–16)
MAGNESIUM SERPL-MCNC: 1.7 MG/DL (ref 1.6–2.4)
MCH RBC QN AUTO: 29 PG (ref 26–34)
MCHC RBC AUTO-ENTMCNC: 31.7 G/DL (ref 30–36.5)
MCV RBC AUTO: 91.3 FL (ref 80–99)
NRBC # BLD: 0 K/UL (ref 0–0.01)
NRBC BLD-RTO: 0 PER 100 WBC
PHOSPHATE SERPL-MCNC: 3.2 MG/DL (ref 2.6–4.7)
PLATELET # BLD AUTO: 539 K/UL (ref 150–400)
PMV BLD AUTO: 10.3 FL (ref 8.9–12.9)
POTASSIUM SERPL-SCNC: 4 MMOL/L (ref 3.5–5.1)
RBC # BLD AUTO: 2.76 M/UL (ref 3.8–5.2)
SODIUM SERPL-SCNC: 131 MMOL/L (ref 136–145)
WBC # BLD AUTO: 10.8 K/UL (ref 3.6–11)

## 2024-04-19 PROCEDURE — 6360000002 HC RX W HCPCS: Performed by: INTERNAL MEDICINE

## 2024-04-19 PROCEDURE — 6370000000 HC RX 637 (ALT 250 FOR IP): Performed by: GENERAL ACUTE CARE HOSPITAL

## 2024-04-19 PROCEDURE — 6370000000 HC RX 637 (ALT 250 FOR IP): Performed by: STUDENT IN AN ORGANIZED HEALTH CARE EDUCATION/TRAINING PROGRAM

## 2024-04-19 PROCEDURE — 80048 BASIC METABOLIC PNL TOTAL CA: CPT

## 2024-04-19 PROCEDURE — 6370000000 HC RX 637 (ALT 250 FOR IP): Performed by: PHYSICIAN ASSISTANT

## 2024-04-19 PROCEDURE — 2580000003 HC RX 258: Performed by: INTERNAL MEDICINE

## 2024-04-19 PROCEDURE — 99231 SBSQ HOSP IP/OBS SF/LOW 25: CPT | Performed by: INTERNAL MEDICINE

## 2024-04-19 PROCEDURE — 2060000000 HC ICU INTERMEDIATE R&B

## 2024-04-19 PROCEDURE — 84100 ASSAY OF PHOSPHORUS: CPT

## 2024-04-19 PROCEDURE — 6370000000 HC RX 637 (ALT 250 FOR IP): Performed by: INTERNAL MEDICINE

## 2024-04-19 PROCEDURE — 6360000002 HC RX W HCPCS: Performed by: NURSE PRACTITIONER

## 2024-04-19 PROCEDURE — 2700000000 HC OXYGEN THERAPY PER DAY

## 2024-04-19 PROCEDURE — 6370000000 HC RX 637 (ALT 250 FOR IP): Performed by: NURSE PRACTITIONER

## 2024-04-19 PROCEDURE — 85027 COMPLETE CBC AUTOMATED: CPT

## 2024-04-19 PROCEDURE — 36415 COLL VENOUS BLD VENIPUNCTURE: CPT

## 2024-04-19 PROCEDURE — 83735 ASSAY OF MAGNESIUM: CPT

## 2024-04-19 RX ADMIN — DIVALPROEX SODIUM 250 MG: 250 TABLET, DELAYED RELEASE ORAL at 21:40

## 2024-04-19 RX ADMIN — SUCRALFATE 1 G: 1 TABLET ORAL at 17:08

## 2024-04-19 RX ADMIN — DIVALPROEX SODIUM 250 MG: 250 TABLET, DELAYED RELEASE ORAL at 08:32

## 2024-04-19 RX ADMIN — FUROSEMIDE 20 MG: 10 INJECTION, SOLUTION INTRAMUSCULAR; INTRAVENOUS at 17:00

## 2024-04-19 RX ADMIN — FUROSEMIDE 20 MG: 10 INJECTION, SOLUTION INTRAMUSCULAR; INTRAVENOUS at 08:12

## 2024-04-19 RX ADMIN — OCTREOTIDE ACETATE 50 MCG: 50 INJECTION, SOLUTION INTRAVENOUS; SUBCUTANEOUS at 06:41

## 2024-04-19 RX ADMIN — PANTOPRAZOLE SODIUM 40 MG: 40 TABLET, DELAYED RELEASE ORAL at 17:08

## 2024-04-19 RX ADMIN — SUCRALFATE 1 G: 1 TABLET ORAL at 21:40

## 2024-04-19 RX ADMIN — SUCRALFATE 1 G: 1 TABLET ORAL at 11:27

## 2024-04-19 RX ADMIN — OCTREOTIDE ACETATE 50 MCG: 50 INJECTION, SOLUTION INTRAVENOUS; SUBCUTANEOUS at 17:08

## 2024-04-19 RX ADMIN — METOPROLOL TARTRATE 12.5 MG: 25 TABLET, FILM COATED ORAL at 21:41

## 2024-04-19 RX ADMIN — PANTOPRAZOLE SODIUM 40 MG: 40 TABLET, DELAYED RELEASE ORAL at 06:42

## 2024-04-19 RX ADMIN — PREDNISONE 40 MG: 20 TABLET ORAL at 08:12

## 2024-04-19 RX ADMIN — ATORVASTATIN CALCIUM 20 MG: 20 TABLET, FILM COATED ORAL at 08:13

## 2024-04-19 RX ADMIN — MIRTAZAPINE 15 MG: 15 TABLET, FILM COATED ORAL at 21:41

## 2024-04-19 RX ADMIN — SUCRALFATE 1 G: 1 TABLET ORAL at 06:42

## 2024-04-19 RX ADMIN — POTASSIUM CHLORIDE 20 MEQ: 750 TABLET, FILM COATED, EXTENDED RELEASE ORAL at 08:12

## 2024-04-19 RX ADMIN — DIAZEPAM 1 MG: 2 TABLET ORAL at 21:40

## 2024-04-19 RX ADMIN — SODIUM CHLORIDE, PRESERVATIVE FREE 10 ML: 5 INJECTION INTRAVENOUS at 21:39

## 2024-04-19 RX ADMIN — METOPROLOL TARTRATE 12.5 MG: 25 TABLET, FILM COATED ORAL at 08:13

## 2024-04-19 RX ADMIN — SODIUM CHLORIDE, PRESERVATIVE FREE 10 ML: 5 INJECTION INTRAVENOUS at 08:14

## 2024-04-19 ASSESSMENT — PAIN SCALES - GENERAL
PAINLEVEL_OUTOF10: 0

## 2024-04-19 NOTE — OP NOTE
[facility]              [address] [place]  [address]                            OPERATIVE REPORT      PATIENT NAME: MARY SÁNCHEZ                 : 1955  MED REC NO: [id number]                       ROOM: 2301  ACCOUNT NO: [id number]                       ADMIT DATE: 2024  PROVIDER: Galileo Alberts MD    DATE OF SERVICE:  04/15/2024    PREOPERATIVE DIAGNOSES:  Arteriovenous malformations.    POSTOPERATIVE DIAGNOSES:  Multiple small intestinal arteriovenous malformations.    PROCEDURES PERFORMED:  Pill Cam    SURGEON:  Galileo Alberts MD    ASSISTANT:  n/a    ANESTHESIA:  None.    ESTIMATED BLOOD LOSS:  none    SPECIMENS REMOVED:  n/a    INTRAOPERATIVE FINDINGS:  see below     COMPLICATIONS:  none    IMPLANTS:  None.    INDICATIONS:  Recurrent GI bleeding, history of small bowel AVMs.    DESCRIPTION OF PROCEDURE:  The patient had a capsule placed endoscopically.  The 1st AVM was noted at 4 minutes and 13 seconds, which was considered duodenal.  There was a 2nd one at 4 minutes and 19 seconds and a small one at 5 minutes and 50 seconds and again at 7 minutes and 30 seconds and thereafter for additional AVMs between 7 minutes and 37 seconds, and 9 minutes and 6 seconds.  Four additional AVMs at 1044, 20 minutes, 2035, and 2419.  Then, another large AVM at 26 minutes and 47 seconds, and 27 minutes and 14 seconds.  In total, there were 27 AVMs, some small, none of which were actually bleeding.  When we further down in the jejunum at 1 minute 1329, 1 minute 1448 2AVMs, 1 minute 1535, 1 minute 1720.  Last AVMs were more distal or made at 4 hours 4 seconds and 55, 4 hours 5 seconds and 50.  Capsule reached cecum in 7 hours 29 minutes and 20 seconds.  Once again, in total, 27 AVMs, none of which were bleeding, scattered throughout the small intestine.    RECOMMENDATION:  Consideration for deep enteroscopy as next step in evaluation.        GALILEO RIVERA

## 2024-04-20 ENCOUNTER — APPOINTMENT (OUTPATIENT)
Facility: HOSPITAL | Age: 69
DRG: 853 | End: 2024-04-20
Payer: COMMERCIAL

## 2024-04-20 PROBLEM — E43 SEVERE PROTEIN-CALORIE MALNUTRITION (GOMEZ: LESS THAN 60% OF STANDARD WEIGHT) (HCC): Status: ACTIVE | Noted: 2024-04-20

## 2024-04-20 PROBLEM — D64.9 ABSOLUTE ANEMIA: Status: ACTIVE | Noted: 2024-04-20

## 2024-04-20 PROBLEM — J96.01 ACUTE RESPIRATORY FAILURE WITH HYPOXIA (HCC): Status: ACTIVE | Noted: 2024-04-20

## 2024-04-20 PROBLEM — C34.91 LUNG CANCER, PRIMARY, WITH METASTASIS FROM LUNG TO OTHER SITE, RIGHT (HCC): Status: ACTIVE | Noted: 2024-04-20

## 2024-04-20 LAB
ANION GAP SERPL CALC-SCNC: 7 MMOL/L (ref 5–15)
BACTERIA SPEC CULT: NORMAL
BUN SERPL-MCNC: 22 MG/DL (ref 6–20)
BUN/CREAT SERPL: 26 (ref 12–20)
CALCIUM SERPL-MCNC: 9.3 MG/DL (ref 8.5–10.1)
CHLORIDE SERPL-SCNC: 95 MMOL/L (ref 97–108)
CO2 SERPL-SCNC: 31 MMOL/L (ref 21–32)
CREAT SERPL-MCNC: 0.86 MG/DL (ref 0.55–1.02)
ERYTHROCYTE [DISTWIDTH] IN BLOOD BY AUTOMATED COUNT: 17.2 % (ref 11.5–14.5)
GALACTOMANNAN AG SPEC IA-ACNC: 0.07 INDEX (ref 0–0.49)
GLUCOSE SERPL-MCNC: 144 MG/DL (ref 65–100)
HCT VFR BLD AUTO: 25.3 % (ref 35–47)
HGB BLD-MCNC: 8.1 G/DL (ref 11.5–16)
MAGNESIUM SERPL-MCNC: 1.5 MG/DL (ref 1.6–2.4)
MCH RBC QN AUTO: 29.3 PG (ref 26–34)
MCHC RBC AUTO-ENTMCNC: 32 G/DL (ref 30–36.5)
MCV RBC AUTO: 91.7 FL (ref 80–99)
NRBC # BLD: 0 K/UL (ref 0–0.01)
NRBC BLD-RTO: 0 PER 100 WBC
PHOSPHATE SERPL-MCNC: 3 MG/DL (ref 2.6–4.7)
PLATELET # BLD AUTO: 539 K/UL (ref 150–400)
PMV BLD AUTO: 10.2 FL (ref 8.9–12.9)
POTASSIUM SERPL-SCNC: 3.9 MMOL/L (ref 3.5–5.1)
RBC # BLD AUTO: 2.76 M/UL (ref 3.8–5.2)
SERVICE CMNT-IMP: NORMAL
SODIUM SERPL-SCNC: 133 MMOL/L (ref 136–145)
WBC # BLD AUTO: 11.8 K/UL (ref 3.6–11)

## 2024-04-20 PROCEDURE — 2700000000 HC OXYGEN THERAPY PER DAY

## 2024-04-20 PROCEDURE — 70470 CT HEAD/BRAIN W/O & W/DYE: CPT

## 2024-04-20 PROCEDURE — 80048 BASIC METABOLIC PNL TOTAL CA: CPT

## 2024-04-20 PROCEDURE — 85027 COMPLETE CBC AUTOMATED: CPT

## 2024-04-20 PROCEDURE — 6360000002 HC RX W HCPCS: Performed by: INTERNAL MEDICINE

## 2024-04-20 PROCEDURE — 6370000000 HC RX 637 (ALT 250 FOR IP): Performed by: PHYSICIAN ASSISTANT

## 2024-04-20 PROCEDURE — 6370000000 HC RX 637 (ALT 250 FOR IP): Performed by: GENERAL ACUTE CARE HOSPITAL

## 2024-04-20 PROCEDURE — 2060000000 HC ICU INTERMEDIATE R&B

## 2024-04-20 PROCEDURE — 6360000004 HC RX CONTRAST MEDICATION: Performed by: STUDENT IN AN ORGANIZED HEALTH CARE EDUCATION/TRAINING PROGRAM

## 2024-04-20 PROCEDURE — 84100 ASSAY OF PHOSPHORUS: CPT

## 2024-04-20 PROCEDURE — 99223 1ST HOSP IP/OBS HIGH 75: CPT | Performed by: INTERNAL MEDICINE

## 2024-04-20 PROCEDURE — 6370000000 HC RX 637 (ALT 250 FOR IP): Performed by: NURSE PRACTITIONER

## 2024-04-20 PROCEDURE — 6360000002 HC RX W HCPCS: Performed by: STUDENT IN AN ORGANIZED HEALTH CARE EDUCATION/TRAINING PROGRAM

## 2024-04-20 PROCEDURE — 36415 COLL VENOUS BLD VENIPUNCTURE: CPT

## 2024-04-20 PROCEDURE — 6370000000 HC RX 637 (ALT 250 FOR IP): Performed by: STUDENT IN AN ORGANIZED HEALTH CARE EDUCATION/TRAINING PROGRAM

## 2024-04-20 PROCEDURE — 2580000003 HC RX 258: Performed by: INTERNAL MEDICINE

## 2024-04-20 PROCEDURE — 83735 ASSAY OF MAGNESIUM: CPT

## 2024-04-20 PROCEDURE — 6370000000 HC RX 637 (ALT 250 FOR IP): Performed by: INTERNAL MEDICINE

## 2024-04-20 PROCEDURE — 6360000002 HC RX W HCPCS: Performed by: NURSE PRACTITIONER

## 2024-04-20 RX ORDER — MAGNESIUM SULFATE IN WATER 40 MG/ML
2000 INJECTION, SOLUTION INTRAVENOUS ONCE
Status: COMPLETED | OUTPATIENT
Start: 2024-04-20 | End: 2024-04-20

## 2024-04-20 RX ADMIN — IRON SUCROSE 200 MG: 20 INJECTION, SOLUTION INTRAVENOUS at 15:59

## 2024-04-20 RX ADMIN — OCTREOTIDE ACETATE 50 MCG: 50 INJECTION, SOLUTION INTRAVENOUS; SUBCUTANEOUS at 16:40

## 2024-04-20 RX ADMIN — DIVALPROEX SODIUM 250 MG: 250 TABLET, DELAYED RELEASE ORAL at 22:30

## 2024-04-20 RX ADMIN — SUCRALFATE 1 G: 1 TABLET ORAL at 16:39

## 2024-04-20 RX ADMIN — METOPROLOL TARTRATE 12.5 MG: 25 TABLET, FILM COATED ORAL at 10:20

## 2024-04-20 RX ADMIN — SODIUM CHLORIDE, PRESERVATIVE FREE 10 ML: 5 INJECTION INTRAVENOUS at 10:22

## 2024-04-20 RX ADMIN — FUROSEMIDE 20 MG: 10 INJECTION, SOLUTION INTRAMUSCULAR; INTRAVENOUS at 10:17

## 2024-04-20 RX ADMIN — SODIUM CHLORIDE, PRESERVATIVE FREE 40 ML: 5 INJECTION INTRAVENOUS at 16:00

## 2024-04-20 RX ADMIN — IOPAMIDOL 100 ML: 755 INJECTION, SOLUTION INTRAVENOUS at 19:10

## 2024-04-20 RX ADMIN — SUCRALFATE 1 G: 1 TABLET ORAL at 22:30

## 2024-04-20 RX ADMIN — SENNOSIDES AND DOCUSATE SODIUM 1 TABLET: 50; 8.6 TABLET ORAL at 10:20

## 2024-04-20 RX ADMIN — DIVALPROEX SODIUM 250 MG: 250 TABLET, DELAYED RELEASE ORAL at 10:21

## 2024-04-20 RX ADMIN — POTASSIUM CHLORIDE 20 MEQ: 750 TABLET, FILM COATED, EXTENDED RELEASE ORAL at 10:20

## 2024-04-20 RX ADMIN — METOPROLOL TARTRATE 12.5 MG: 25 TABLET, FILM COATED ORAL at 22:28

## 2024-04-20 RX ADMIN — ATORVASTATIN CALCIUM 20 MG: 20 TABLET, FILM COATED ORAL at 10:21

## 2024-04-20 RX ADMIN — FUROSEMIDE 20 MG: 10 INJECTION, SOLUTION INTRAMUSCULAR; INTRAVENOUS at 16:40

## 2024-04-20 RX ADMIN — MAGNESIUM SULFATE HEPTAHYDRATE 2000 MG: 40 INJECTION, SOLUTION INTRAVENOUS at 10:57

## 2024-04-20 RX ADMIN — DIAZEPAM 1 MG: 2 TABLET ORAL at 22:29

## 2024-04-20 RX ADMIN — SUCRALFATE 1 G: 1 TABLET ORAL at 06:20

## 2024-04-20 RX ADMIN — SUCRALFATE 1 G: 1 TABLET ORAL at 10:18

## 2024-04-20 RX ADMIN — SODIUM CHLORIDE, PRESERVATIVE FREE 10 ML: 5 INJECTION INTRAVENOUS at 22:30

## 2024-04-20 RX ADMIN — MIRTAZAPINE 15 MG: 15 TABLET, FILM COATED ORAL at 22:30

## 2024-04-20 RX ADMIN — PANTOPRAZOLE SODIUM 40 MG: 40 TABLET, DELAYED RELEASE ORAL at 16:39

## 2024-04-20 RX ADMIN — PANTOPRAZOLE SODIUM 40 MG: 40 TABLET, DELAYED RELEASE ORAL at 06:19

## 2024-04-20 RX ADMIN — PREDNISONE 40 MG: 20 TABLET ORAL at 10:18

## 2024-04-20 RX ADMIN — OCTREOTIDE ACETATE 50 MCG: 50 INJECTION, SOLUTION INTRAVENOUS; SUBCUTANEOUS at 06:20

## 2024-04-20 ASSESSMENT — PAIN SCALES - GENERAL
PAINLEVEL_OUTOF10: 0

## 2024-04-20 ASSESSMENT — PAIN SCALES - WONG BAKER: WONGBAKER_NUMERICALRESPONSE: NO HURT

## 2024-04-21 LAB
ANION GAP SERPL CALC-SCNC: 7 MMOL/L (ref 5–15)
BUN SERPL-MCNC: 22 MG/DL (ref 6–20)
BUN/CREAT SERPL: 24 (ref 12–20)
CALCIUM SERPL-MCNC: 8.9 MG/DL (ref 8.5–10.1)
CHLORIDE SERPL-SCNC: 94 MMOL/L (ref 97–108)
CO2 SERPL-SCNC: 31 MMOL/L (ref 21–32)
CREAT SERPL-MCNC: 0.92 MG/DL (ref 0.55–1.02)
ERYTHROCYTE [DISTWIDTH] IN BLOOD BY AUTOMATED COUNT: 17 % (ref 11.5–14.5)
GLUCOSE SERPL-MCNC: 133 MG/DL (ref 65–100)
HCT VFR BLD AUTO: 24.5 % (ref 35–47)
HGB BLD-MCNC: 7.6 G/DL (ref 11.5–16)
MAGNESIUM SERPL-MCNC: 1.8 MG/DL (ref 1.6–2.4)
MCH RBC QN AUTO: 28.3 PG (ref 26–34)
MCHC RBC AUTO-ENTMCNC: 31 G/DL (ref 30–36.5)
MCV RBC AUTO: 91.1 FL (ref 80–99)
NRBC # BLD: 0 K/UL (ref 0–0.01)
NRBC BLD-RTO: 0 PER 100 WBC
PHOSPHATE SERPL-MCNC: 3.2 MG/DL (ref 2.6–4.7)
PLATELET # BLD AUTO: 543 K/UL (ref 150–400)
PMV BLD AUTO: 10.5 FL (ref 8.9–12.9)
POTASSIUM SERPL-SCNC: 3.2 MMOL/L (ref 3.5–5.1)
RBC # BLD AUTO: 2.69 M/UL (ref 3.8–5.2)
SODIUM SERPL-SCNC: 132 MMOL/L (ref 136–145)
WBC # BLD AUTO: 10.9 K/UL (ref 3.6–11)

## 2024-04-21 PROCEDURE — 6370000000 HC RX 637 (ALT 250 FOR IP): Performed by: GENERAL ACUTE CARE HOSPITAL

## 2024-04-21 PROCEDURE — 6370000000 HC RX 637 (ALT 250 FOR IP): Performed by: STUDENT IN AN ORGANIZED HEALTH CARE EDUCATION/TRAINING PROGRAM

## 2024-04-21 PROCEDURE — 2060000000 HC ICU INTERMEDIATE R&B

## 2024-04-21 PROCEDURE — 80048 BASIC METABOLIC PNL TOTAL CA: CPT

## 2024-04-21 PROCEDURE — 6370000000 HC RX 637 (ALT 250 FOR IP): Performed by: INTERNAL MEDICINE

## 2024-04-21 PROCEDURE — 85027 COMPLETE CBC AUTOMATED: CPT

## 2024-04-21 PROCEDURE — 83735 ASSAY OF MAGNESIUM: CPT

## 2024-04-21 PROCEDURE — 6360000002 HC RX W HCPCS: Performed by: INTERNAL MEDICINE

## 2024-04-21 PROCEDURE — 2580000003 HC RX 258: Performed by: INTERNAL MEDICINE

## 2024-04-21 PROCEDURE — 6360000002 HC RX W HCPCS: Performed by: NURSE PRACTITIONER

## 2024-04-21 PROCEDURE — 6370000000 HC RX 637 (ALT 250 FOR IP): Performed by: PHYSICIAN ASSISTANT

## 2024-04-21 PROCEDURE — 84100 ASSAY OF PHOSPHORUS: CPT

## 2024-04-21 PROCEDURE — 2700000000 HC OXYGEN THERAPY PER DAY

## 2024-04-21 PROCEDURE — 36415 COLL VENOUS BLD VENIPUNCTURE: CPT

## 2024-04-21 PROCEDURE — 6370000000 HC RX 637 (ALT 250 FOR IP): Performed by: NURSE PRACTITIONER

## 2024-04-21 RX ORDER — POTASSIUM CHLORIDE 20 MEQ/1
40 TABLET, EXTENDED RELEASE ORAL ONCE
Status: COMPLETED | OUTPATIENT
Start: 2024-04-21 | End: 2024-04-21

## 2024-04-21 RX ADMIN — SUCRALFATE 1 G: 1 TABLET ORAL at 20:56

## 2024-04-21 RX ADMIN — DIVALPROEX SODIUM 250 MG: 250 TABLET, DELAYED RELEASE ORAL at 20:56

## 2024-04-21 RX ADMIN — OCTREOTIDE ACETATE 50 MCG: 50 INJECTION, SOLUTION INTRAVENOUS; SUBCUTANEOUS at 05:39

## 2024-04-21 RX ADMIN — SUCRALFATE 1 G: 1 TABLET ORAL at 17:57

## 2024-04-21 RX ADMIN — ATORVASTATIN CALCIUM 20 MG: 20 TABLET, FILM COATED ORAL at 10:45

## 2024-04-21 RX ADMIN — PANTOPRAZOLE SODIUM 40 MG: 40 TABLET, DELAYED RELEASE ORAL at 05:39

## 2024-04-21 RX ADMIN — METOPROLOL TARTRATE 12.5 MG: 25 TABLET, FILM COATED ORAL at 20:57

## 2024-04-21 RX ADMIN — DIAZEPAM 1 MG: 2 TABLET ORAL at 20:56

## 2024-04-21 RX ADMIN — SODIUM CHLORIDE, PRESERVATIVE FREE 10 ML: 5 INJECTION INTRAVENOUS at 20:58

## 2024-04-21 RX ADMIN — POTASSIUM CHLORIDE 20 MEQ: 750 TABLET, FILM COATED, EXTENDED RELEASE ORAL at 10:44

## 2024-04-21 RX ADMIN — POTASSIUM CHLORIDE 40 MEQ: 1500 TABLET, EXTENDED RELEASE ORAL at 10:44

## 2024-04-21 RX ADMIN — DIVALPROEX SODIUM 250 MG: 250 TABLET, DELAYED RELEASE ORAL at 10:44

## 2024-04-21 RX ADMIN — OCTREOTIDE ACETATE 50 MCG: 50 INJECTION, SOLUTION INTRAVENOUS; SUBCUTANEOUS at 17:58

## 2024-04-21 RX ADMIN — SODIUM CHLORIDE, PRESERVATIVE FREE 40 ML: 5 INJECTION INTRAVENOUS at 10:48

## 2024-04-21 RX ADMIN — MIRTAZAPINE 15 MG: 15 TABLET, FILM COATED ORAL at 20:57

## 2024-04-21 RX ADMIN — PREDNISONE 40 MG: 20 TABLET ORAL at 10:44

## 2024-04-21 RX ADMIN — METOPROLOL TARTRATE 12.5 MG: 25 TABLET, FILM COATED ORAL at 10:45

## 2024-04-21 RX ADMIN — SENNOSIDES AND DOCUSATE SODIUM 1 TABLET: 50; 8.6 TABLET ORAL at 10:45

## 2024-04-21 RX ADMIN — SUCRALFATE 1 G: 1 TABLET ORAL at 05:39

## 2024-04-21 RX ADMIN — FUROSEMIDE 20 MG: 10 INJECTION, SOLUTION INTRAMUSCULAR; INTRAVENOUS at 17:57

## 2024-04-21 RX ADMIN — FUROSEMIDE 20 MG: 10 INJECTION, SOLUTION INTRAMUSCULAR; INTRAVENOUS at 10:44

## 2024-04-21 RX ADMIN — IRON SUCROSE 200 MG: 20 INJECTION, SOLUTION INTRAVENOUS at 12:41

## 2024-04-21 RX ADMIN — PANTOPRAZOLE SODIUM 40 MG: 40 TABLET, DELAYED RELEASE ORAL at 17:58

## 2024-04-21 RX ADMIN — SUCRALFATE 1 G: 1 TABLET ORAL at 10:44

## 2024-04-21 ASSESSMENT — PAIN SCALES - GENERAL
PAINLEVEL_OUTOF10: 0

## 2024-04-22 LAB
ANION GAP SERPL CALC-SCNC: 6 MMOL/L (ref 5–15)
BASOPHILS # BLD: 0 K/UL (ref 0–0.1)
BASOPHILS NFR BLD: 0 % (ref 0–1)
BUN SERPL-MCNC: 23 MG/DL (ref 6–20)
BUN/CREAT SERPL: 26 (ref 12–20)
CALCIUM SERPL-MCNC: 9.3 MG/DL (ref 8.5–10.1)
CHLORIDE SERPL-SCNC: 95 MMOL/L (ref 97–108)
CO2 SERPL-SCNC: 35 MMOL/L (ref 21–32)
COMMENT:: NORMAL
CREAT SERPL-MCNC: 0.89 MG/DL (ref 0.55–1.02)
DIFFERENTIAL METHOD BLD: ABNORMAL
EOSINOPHIL # BLD: 0 K/UL (ref 0–0.4)
EOSINOPHIL NFR BLD: 0 % (ref 0–7)
ERYTHROCYTE [DISTWIDTH] IN BLOOD BY AUTOMATED COUNT: 17.5 % (ref 11.5–14.5)
GLUCOSE SERPL-MCNC: 130 MG/DL (ref 65–100)
HCT VFR BLD AUTO: 25.4 % (ref 35–47)
HGB BLD-MCNC: 7.9 G/DL (ref 11.5–16)
IMM GRANULOCYTES # BLD AUTO: 0.1 K/UL (ref 0–0.04)
IMM GRANULOCYTES NFR BLD AUTO: 1 % (ref 0–0.5)
LYMPHOCYTES # BLD: 1.3 K/UL (ref 0.8–3.5)
LYMPHOCYTES NFR BLD: 11 % (ref 12–49)
MAGNESIUM SERPL-MCNC: 1.6 MG/DL (ref 1.6–2.4)
MCH RBC QN AUTO: 28.7 PG (ref 26–34)
MCHC RBC AUTO-ENTMCNC: 31.1 G/DL (ref 30–36.5)
MCV RBC AUTO: 92.4 FL (ref 80–99)
MONOCYTES # BLD: 2.1 K/UL (ref 0–1)
MONOCYTES NFR BLD: 17 % (ref 5–13)
NEUTS SEG # BLD: 8.6 K/UL (ref 1.8–8)
NEUTS SEG NFR BLD: 71 % (ref 32–75)
NRBC # BLD: 0 K/UL (ref 0–0.01)
NRBC BLD-RTO: 0 PER 100 WBC
PHOSPHATE SERPL-MCNC: 3.6 MG/DL (ref 2.6–4.7)
PLATELET # BLD AUTO: 468 K/UL (ref 150–400)
PMV BLD AUTO: 11.2 FL (ref 8.9–12.9)
POTASSIUM SERPL-SCNC: 3.5 MMOL/L (ref 3.5–5.1)
RBC # BLD AUTO: 2.75 M/UL (ref 3.8–5.2)
RBC MORPH BLD: ABNORMAL
SODIUM SERPL-SCNC: 136 MMOL/L (ref 136–145)
SPECIMEN HOLD: NORMAL
WBC # BLD AUTO: 12.1 K/UL (ref 3.6–11)

## 2024-04-22 PROCEDURE — 6370000000 HC RX 637 (ALT 250 FOR IP): Performed by: PHYSICIAN ASSISTANT

## 2024-04-22 PROCEDURE — 2060000000 HC ICU INTERMEDIATE R&B

## 2024-04-22 PROCEDURE — 6360000002 HC RX W HCPCS: Performed by: NURSE PRACTITIONER

## 2024-04-22 PROCEDURE — 85025 COMPLETE CBC W/AUTO DIFF WBC: CPT

## 2024-04-22 PROCEDURE — 2700000000 HC OXYGEN THERAPY PER DAY

## 2024-04-22 PROCEDURE — 36415 COLL VENOUS BLD VENIPUNCTURE: CPT

## 2024-04-22 PROCEDURE — 51798 US URINE CAPACITY MEASURE: CPT

## 2024-04-22 PROCEDURE — 83735 ASSAY OF MAGNESIUM: CPT

## 2024-04-22 PROCEDURE — 6360000002 HC RX W HCPCS: Performed by: INTERNAL MEDICINE

## 2024-04-22 PROCEDURE — 80048 BASIC METABOLIC PNL TOTAL CA: CPT

## 2024-04-22 PROCEDURE — 84100 ASSAY OF PHOSPHORUS: CPT

## 2024-04-22 PROCEDURE — 87086 URINE CULTURE/COLONY COUNT: CPT

## 2024-04-22 PROCEDURE — 6370000000 HC RX 637 (ALT 250 FOR IP): Performed by: STUDENT IN AN ORGANIZED HEALTH CARE EDUCATION/TRAINING PROGRAM

## 2024-04-22 PROCEDURE — 87106 FUNGI IDENTIFICATION YEAST: CPT

## 2024-04-22 PROCEDURE — 6370000000 HC RX 637 (ALT 250 FOR IP): Performed by: NURSE PRACTITIONER

## 2024-04-22 PROCEDURE — 2580000003 HC RX 258: Performed by: INTERNAL MEDICINE

## 2024-04-22 PROCEDURE — 6370000000 HC RX 637 (ALT 250 FOR IP): Performed by: INTERNAL MEDICINE

## 2024-04-22 PROCEDURE — 6370000000 HC RX 637 (ALT 250 FOR IP): Performed by: GENERAL ACUTE CARE HOSPITAL

## 2024-04-22 PROCEDURE — 87040 BLOOD CULTURE FOR BACTERIA: CPT

## 2024-04-22 RX ADMIN — ACETAMINOPHEN 650 MG: 325 TABLET ORAL at 09:59

## 2024-04-22 RX ADMIN — OCTREOTIDE ACETATE 50 MCG: 50 INJECTION, SOLUTION INTRAVENOUS; SUBCUTANEOUS at 17:17

## 2024-04-22 RX ADMIN — METOPROLOL TARTRATE 12.5 MG: 25 TABLET, FILM COATED ORAL at 09:34

## 2024-04-22 RX ADMIN — DIAZEPAM 1 MG: 2 TABLET ORAL at 20:49

## 2024-04-22 RX ADMIN — SUCRALFATE 1 G: 1 TABLET ORAL at 11:34

## 2024-04-22 RX ADMIN — DIVALPROEX SODIUM 250 MG: 250 TABLET, DELAYED RELEASE ORAL at 20:49

## 2024-04-22 RX ADMIN — IRON SUCROSE 200 MG: 20 INJECTION, SOLUTION INTRAVENOUS at 11:35

## 2024-04-22 RX ADMIN — SUCRALFATE 1 G: 1 TABLET ORAL at 20:47

## 2024-04-22 RX ADMIN — POTASSIUM CHLORIDE 20 MEQ: 750 TABLET, FILM COATED, EXTENDED RELEASE ORAL at 09:33

## 2024-04-22 RX ADMIN — SODIUM CHLORIDE, PRESERVATIVE FREE 10 ML: 5 INJECTION INTRAVENOUS at 09:40

## 2024-04-22 RX ADMIN — ATORVASTATIN CALCIUM 20 MG: 20 TABLET, FILM COATED ORAL at 09:33

## 2024-04-22 RX ADMIN — SENNOSIDES AND DOCUSATE SODIUM 1 TABLET: 50; 8.6 TABLET ORAL at 09:35

## 2024-04-22 RX ADMIN — METOPROLOL TARTRATE 12.5 MG: 25 TABLET, FILM COATED ORAL at 20:47

## 2024-04-22 RX ADMIN — OCTREOTIDE ACETATE 50 MCG: 50 INJECTION, SOLUTION INTRAVENOUS; SUBCUTANEOUS at 05:15

## 2024-04-22 RX ADMIN — PREDNISONE 40 MG: 20 TABLET ORAL at 09:32

## 2024-04-22 RX ADMIN — SUCRALFATE 1 G: 1 TABLET ORAL at 17:21

## 2024-04-22 RX ADMIN — FUROSEMIDE 20 MG: 10 INJECTION, SOLUTION INTRAMUSCULAR; INTRAVENOUS at 09:37

## 2024-04-22 RX ADMIN — SODIUM CHLORIDE, PRESERVATIVE FREE 10 ML: 5 INJECTION INTRAVENOUS at 20:50

## 2024-04-22 RX ADMIN — PANTOPRAZOLE SODIUM 40 MG: 40 TABLET, DELAYED RELEASE ORAL at 05:16

## 2024-04-22 RX ADMIN — MIRTAZAPINE 15 MG: 15 TABLET, FILM COATED ORAL at 20:47

## 2024-04-22 RX ADMIN — DIVALPROEX SODIUM 250 MG: 250 TABLET, DELAYED RELEASE ORAL at 09:30

## 2024-04-22 RX ADMIN — FUROSEMIDE 20 MG: 10 INJECTION, SOLUTION INTRAMUSCULAR; INTRAVENOUS at 18:05

## 2024-04-22 RX ADMIN — PANTOPRAZOLE SODIUM 40 MG: 40 TABLET, DELAYED RELEASE ORAL at 15:38

## 2024-04-22 ASSESSMENT — PAIN SCALES - GENERAL
PAINLEVEL_OUTOF10: 0

## 2024-04-23 LAB
ANION GAP SERPL CALC-SCNC: 4 MMOL/L (ref 5–15)
BASOPHILS # BLD: 0 K/UL (ref 0–0.1)
BASOPHILS NFR BLD: 0 % (ref 0–1)
BUN SERPL-MCNC: 25 MG/DL (ref 6–20)
BUN/CREAT SERPL: 31 (ref 12–20)
CALCIUM SERPL-MCNC: 9.4 MG/DL (ref 8.5–10.1)
CHLORIDE SERPL-SCNC: 95 MMOL/L (ref 97–108)
CO2 SERPL-SCNC: 38 MMOL/L (ref 21–32)
CREAT SERPL-MCNC: 0.81 MG/DL (ref 0.55–1.02)
DIFFERENTIAL METHOD BLD: ABNORMAL
EOSINOPHIL # BLD: 0 K/UL (ref 0–0.4)
EOSINOPHIL NFR BLD: 0 % (ref 0–7)
ERYTHROCYTE [DISTWIDTH] IN BLOOD BY AUTOMATED COUNT: 17.9 % (ref 11.5–14.5)
GLUCOSE SERPL-MCNC: 125 MG/DL (ref 65–100)
HCT VFR BLD AUTO: 24.6 % (ref 35–47)
HGB BLD-MCNC: 7.7 G/DL (ref 11.5–16)
IMM GRANULOCYTES # BLD AUTO: 0.1 K/UL (ref 0–0.04)
IMM GRANULOCYTES NFR BLD AUTO: 1 % (ref 0–0.5)
LYMPHOCYTES # BLD: 1.5 K/UL (ref 0.8–3.5)
LYMPHOCYTES NFR BLD: 10 % (ref 12–49)
MAGNESIUM SERPL-MCNC: 1.7 MG/DL (ref 1.6–2.4)
MCH RBC QN AUTO: 28.2 PG (ref 26–34)
MCHC RBC AUTO-ENTMCNC: 31.3 G/DL (ref 30–36.5)
MCV RBC AUTO: 90.1 FL (ref 80–99)
MONOCYTES # BLD: 2.2 K/UL (ref 0–1)
MONOCYTES NFR BLD: 15 % (ref 5–13)
NEUTS SEG # BLD: 10.9 K/UL (ref 1.8–8)
NEUTS SEG NFR BLD: 74 % (ref 32–75)
NRBC # BLD: 0 K/UL (ref 0–0.01)
NRBC BLD-RTO: 0 PER 100 WBC
PHOSPHATE SERPL-MCNC: 2.9 MG/DL (ref 2.6–4.7)
PLATELET # BLD AUTO: 428 K/UL (ref 150–400)
PMV BLD AUTO: 10.8 FL (ref 8.9–12.9)
POTASSIUM SERPL-SCNC: 3.4 MMOL/L (ref 3.5–5.1)
RBC # BLD AUTO: 2.73 M/UL (ref 3.8–5.2)
RBC MORPH BLD: ABNORMAL
SODIUM SERPL-SCNC: 137 MMOL/L (ref 136–145)
WBC # BLD AUTO: 14.7 K/UL (ref 3.6–11)

## 2024-04-23 PROCEDURE — 6370000000 HC RX 637 (ALT 250 FOR IP): Performed by: STUDENT IN AN ORGANIZED HEALTH CARE EDUCATION/TRAINING PROGRAM

## 2024-04-23 PROCEDURE — 36415 COLL VENOUS BLD VENIPUNCTURE: CPT

## 2024-04-23 PROCEDURE — 6370000000 HC RX 637 (ALT 250 FOR IP): Performed by: INTERNAL MEDICINE

## 2024-04-23 PROCEDURE — 84100 ASSAY OF PHOSPHORUS: CPT

## 2024-04-23 PROCEDURE — 99233 SBSQ HOSP IP/OBS HIGH 50: CPT | Performed by: INTERNAL MEDICINE

## 2024-04-23 PROCEDURE — 2700000000 HC OXYGEN THERAPY PER DAY

## 2024-04-23 PROCEDURE — 6370000000 HC RX 637 (ALT 250 FOR IP): Performed by: PHYSICIAN ASSISTANT

## 2024-04-23 PROCEDURE — 85025 COMPLETE CBC W/AUTO DIFF WBC: CPT

## 2024-04-23 PROCEDURE — 2060000000 HC ICU INTERMEDIATE R&B

## 2024-04-23 PROCEDURE — 83735 ASSAY OF MAGNESIUM: CPT

## 2024-04-23 PROCEDURE — 2580000003 HC RX 258: Performed by: INTERNAL MEDICINE

## 2024-04-23 PROCEDURE — 6360000002 HC RX W HCPCS: Performed by: INTERNAL MEDICINE

## 2024-04-23 PROCEDURE — 6370000000 HC RX 637 (ALT 250 FOR IP): Performed by: GENERAL ACUTE CARE HOSPITAL

## 2024-04-23 PROCEDURE — 6360000002 HC RX W HCPCS: Performed by: NURSE PRACTITIONER

## 2024-04-23 PROCEDURE — 6370000000 HC RX 637 (ALT 250 FOR IP): Performed by: NURSE PRACTITIONER

## 2024-04-23 PROCEDURE — 80048 BASIC METABOLIC PNL TOTAL CA: CPT

## 2024-04-23 RX ORDER — POTASSIUM CHLORIDE 20 MEQ/1
40 TABLET, EXTENDED RELEASE ORAL ONCE
Status: COMPLETED | OUTPATIENT
Start: 2024-04-23 | End: 2024-04-23

## 2024-04-23 RX ADMIN — DIVALPROEX SODIUM 250 MG: 250 TABLET, DELAYED RELEASE ORAL at 09:13

## 2024-04-23 RX ADMIN — SODIUM CHLORIDE, PRESERVATIVE FREE 10 ML: 5 INJECTION INTRAVENOUS at 21:39

## 2024-04-23 RX ADMIN — METOPROLOL TARTRATE 12.5 MG: 25 TABLET, FILM COATED ORAL at 21:36

## 2024-04-23 RX ADMIN — SUCRALFATE 1 G: 1 TABLET ORAL at 17:25

## 2024-04-23 RX ADMIN — OCTREOTIDE ACETATE 50 MCG: 50 INJECTION, SOLUTION INTRAVENOUS; SUBCUTANEOUS at 16:24

## 2024-04-23 RX ADMIN — SODIUM CHLORIDE, PRESERVATIVE FREE 10 ML: 5 INJECTION INTRAVENOUS at 09:07

## 2024-04-23 RX ADMIN — ATORVASTATIN CALCIUM 20 MG: 20 TABLET, FILM COATED ORAL at 09:11

## 2024-04-23 RX ADMIN — POTASSIUM CHLORIDE 20 MEQ: 750 TABLET, FILM COATED, EXTENDED RELEASE ORAL at 09:08

## 2024-04-23 RX ADMIN — DIAZEPAM 1 MG: 2 TABLET ORAL at 21:39

## 2024-04-23 RX ADMIN — FUROSEMIDE 20 MG: 10 INJECTION, SOLUTION INTRAMUSCULAR; INTRAVENOUS at 17:23

## 2024-04-23 RX ADMIN — SENNOSIDES AND DOCUSATE SODIUM 1 TABLET: 50; 8.6 TABLET ORAL at 09:16

## 2024-04-23 RX ADMIN — OCTREOTIDE ACETATE 50 MCG: 50 INJECTION, SOLUTION INTRAVENOUS; SUBCUTANEOUS at 05:02

## 2024-04-23 RX ADMIN — SUCRALFATE 1 G: 1 TABLET ORAL at 09:18

## 2024-04-23 RX ADMIN — POTASSIUM CHLORIDE 40 MEQ: 1500 TABLET, EXTENDED RELEASE ORAL at 09:11

## 2024-04-23 RX ADMIN — PANTOPRAZOLE SODIUM 40 MG: 40 TABLET, DELAYED RELEASE ORAL at 05:02

## 2024-04-23 RX ADMIN — PANTOPRAZOLE SODIUM 40 MG: 40 TABLET, DELAYED RELEASE ORAL at 16:22

## 2024-04-23 RX ADMIN — MIRTAZAPINE 15 MG: 15 TABLET, FILM COATED ORAL at 21:38

## 2024-04-23 RX ADMIN — DIVALPROEX SODIUM 250 MG: 250 TABLET, DELAYED RELEASE ORAL at 21:30

## 2024-04-23 RX ADMIN — METOPROLOL TARTRATE 12.5 MG: 25 TABLET, FILM COATED ORAL at 09:14

## 2024-04-23 RX ADMIN — SUCRALFATE 1 G: 1 TABLET ORAL at 11:18

## 2024-04-23 RX ADMIN — FUROSEMIDE 20 MG: 10 INJECTION, SOLUTION INTRAMUSCULAR; INTRAVENOUS at 09:03

## 2024-04-23 RX ADMIN — IRON SUCROSE 200 MG: 20 INJECTION, SOLUTION INTRAVENOUS at 11:30

## 2024-04-23 RX ADMIN — SUCRALFATE 1 G: 1 TABLET ORAL at 21:36

## 2024-04-23 ASSESSMENT — PAIN SCALES - GENERAL
PAINLEVEL_OUTOF10: 0

## 2024-04-24 PROBLEM — R53.81 DEBILITY: Status: ACTIVE | Noted: 2024-04-24

## 2024-04-24 LAB
A FLAVUS AB SER QL ID: NEGATIVE
A FUMIGATUS AB SER QL ID: NEGATIVE
A NIGER AB SER QL ID: NEGATIVE
ANION GAP SERPL CALC-SCNC: 5 MMOL/L (ref 5–15)
BACTERIA SPEC CULT: ABNORMAL
BASOPHILS # BLD: 0.1 K/UL (ref 0–0.1)
BASOPHILS NFR BLD: 1 % (ref 0–1)
BUN SERPL-MCNC: 22 MG/DL (ref 6–20)
BUN/CREAT SERPL: 23 (ref 12–20)
CALCIUM SERPL-MCNC: 9 MG/DL (ref 8.5–10.1)
CC UR VC: ABNORMAL
CHLORIDE SERPL-SCNC: 96 MMOL/L (ref 97–108)
CO2 SERPL-SCNC: 37 MMOL/L (ref 21–32)
CREAT SERPL-MCNC: 0.95 MG/DL (ref 0.55–1.02)
DIFFERENTIAL METHOD BLD: ABNORMAL
EOSINOPHIL # BLD: 0 K/UL (ref 0–0.4)
EOSINOPHIL NFR BLD: 0 % (ref 0–7)
ERYTHROCYTE [DISTWIDTH] IN BLOOD BY AUTOMATED COUNT: 18.2 % (ref 11.5–14.5)
GLUCOSE SERPL-MCNC: 145 MG/DL (ref 65–100)
HCT VFR BLD AUTO: 23.1 % (ref 35–47)
HGB BLD-MCNC: 7.2 G/DL (ref 11.5–16)
IMM GRANULOCYTES # BLD AUTO: 0 K/UL (ref 0–0.04)
IMM GRANULOCYTES NFR BLD AUTO: 0 % (ref 0–0.5)
LYMPHOCYTES # BLD: 1.4 K/UL (ref 0.8–3.5)
LYMPHOCYTES NFR BLD: 12 % (ref 12–49)
MAGNESIUM SERPL-MCNC: 1.6 MG/DL (ref 1.6–2.4)
MCH RBC QN AUTO: 28.7 PG (ref 26–34)
MCHC RBC AUTO-ENTMCNC: 31.2 G/DL (ref 30–36.5)
MCV RBC AUTO: 92 FL (ref 80–99)
MONOCYTES # BLD: 1.8 K/UL (ref 0–1)
MONOCYTES NFR BLD: 15 % (ref 5–13)
NEUTS SEG # BLD: 8.6 K/UL (ref 1.8–8)
NEUTS SEG NFR BLD: 72 % (ref 32–75)
NRBC # BLD: 0 K/UL (ref 0–0.01)
NRBC BLD-RTO: 0 PER 100 WBC
PHOSPHATE SERPL-MCNC: 1.9 MG/DL (ref 2.6–4.7)
PLATELET # BLD AUTO: 332 K/UL (ref 150–400)
PMV BLD AUTO: 11.2 FL (ref 8.9–12.9)
POTASSIUM SERPL-SCNC: 3.6 MMOL/L (ref 3.5–5.1)
RBC # BLD AUTO: 2.51 M/UL (ref 3.8–5.2)
RBC MORPH BLD: ABNORMAL
SERVICE CMNT-IMP: ABNORMAL
SODIUM SERPL-SCNC: 138 MMOL/L (ref 136–145)
WBC # BLD AUTO: 11.9 K/UL (ref 3.6–11)

## 2024-04-24 PROCEDURE — 6370000000 HC RX 637 (ALT 250 FOR IP): Performed by: INTERNAL MEDICINE

## 2024-04-24 PROCEDURE — 2700000000 HC OXYGEN THERAPY PER DAY

## 2024-04-24 PROCEDURE — 6370000000 HC RX 637 (ALT 250 FOR IP): Performed by: STUDENT IN AN ORGANIZED HEALTH CARE EDUCATION/TRAINING PROGRAM

## 2024-04-24 PROCEDURE — 2580000003 HC RX 258: Performed by: INTERNAL MEDICINE

## 2024-04-24 PROCEDURE — 2060000000 HC ICU INTERMEDIATE R&B

## 2024-04-24 PROCEDURE — 80048 BASIC METABOLIC PNL TOTAL CA: CPT

## 2024-04-24 PROCEDURE — 6360000002 HC RX W HCPCS: Performed by: INTERNAL MEDICINE

## 2024-04-24 PROCEDURE — 6370000000 HC RX 637 (ALT 250 FOR IP): Performed by: PHYSICIAN ASSISTANT

## 2024-04-24 PROCEDURE — 85025 COMPLETE CBC W/AUTO DIFF WBC: CPT

## 2024-04-24 PROCEDURE — 36415 COLL VENOUS BLD VENIPUNCTURE: CPT

## 2024-04-24 PROCEDURE — 84100 ASSAY OF PHOSPHORUS: CPT

## 2024-04-24 PROCEDURE — 83735 ASSAY OF MAGNESIUM: CPT

## 2024-04-24 PROCEDURE — 6360000002 HC RX W HCPCS: Performed by: NURSE PRACTITIONER

## 2024-04-24 PROCEDURE — 6370000000 HC RX 637 (ALT 250 FOR IP): Performed by: GENERAL ACUTE CARE HOSPITAL

## 2024-04-24 PROCEDURE — 6370000000 HC RX 637 (ALT 250 FOR IP): Performed by: NURSE PRACTITIONER

## 2024-04-24 RX ORDER — SUCRALFATE 1 G/1
1 TABLET ORAL
Qty: 120 TABLET | Refills: 0 | Status: SHIPPED
Start: 2024-04-24

## 2024-04-24 RX ORDER — PANTOPRAZOLE SODIUM 40 MG/1
40 TABLET, DELAYED RELEASE ORAL
Qty: 30 TABLET | Refills: 0 | Status: SHIPPED
Start: 2024-04-24

## 2024-04-24 RX ADMIN — OCTREOTIDE ACETATE 50 MCG: 50 INJECTION, SOLUTION INTRAVENOUS; SUBCUTANEOUS at 18:29

## 2024-04-24 RX ADMIN — DIVALPROEX SODIUM 250 MG: 250 TABLET, DELAYED RELEASE ORAL at 09:35

## 2024-04-24 RX ADMIN — SODIUM CHLORIDE, PRESERVATIVE FREE 10 ML: 5 INJECTION INTRAVENOUS at 07:33

## 2024-04-24 RX ADMIN — SUCRALFATE 1 G: 1 TABLET ORAL at 05:18

## 2024-04-24 RX ADMIN — ACETAMINOPHEN 650 MG: 325 TABLET ORAL at 00:11

## 2024-04-24 RX ADMIN — SODIUM CHLORIDE, PRESERVATIVE FREE 10 ML: 5 INJECTION INTRAVENOUS at 21:04

## 2024-04-24 RX ADMIN — DIAZEPAM 1 MG: 2 TABLET ORAL at 21:03

## 2024-04-24 RX ADMIN — METOPROLOL TARTRATE 12.5 MG: 25 TABLET, FILM COATED ORAL at 21:03

## 2024-04-24 RX ADMIN — DIVALPROEX SODIUM 250 MG: 250 TABLET, DELAYED RELEASE ORAL at 21:04

## 2024-04-24 RX ADMIN — IRON SUCROSE 200 MG: 20 INJECTION, SOLUTION INTRAVENOUS at 12:44

## 2024-04-24 RX ADMIN — PANTOPRAZOLE SODIUM 40 MG: 40 TABLET, DELAYED RELEASE ORAL at 07:32

## 2024-04-24 RX ADMIN — SUCRALFATE 1 G: 1 TABLET ORAL at 21:04

## 2024-04-24 RX ADMIN — SODIUM CHLORIDE, PRESERVATIVE FREE 10 ML: 5 INJECTION INTRAVENOUS at 07:38

## 2024-04-24 RX ADMIN — OCTREOTIDE ACETATE 50 MCG: 50 INJECTION, SOLUTION INTRAVENOUS; SUBCUTANEOUS at 05:18

## 2024-04-24 RX ADMIN — FUROSEMIDE 20 MG: 10 INJECTION, SOLUTION INTRAMUSCULAR; INTRAVENOUS at 09:39

## 2024-04-24 RX ADMIN — METOPROLOL TARTRATE 12.5 MG: 25 TABLET, FILM COATED ORAL at 09:36

## 2024-04-24 RX ADMIN — MIRTAZAPINE 15 MG: 15 TABLET, FILM COATED ORAL at 21:04

## 2024-04-24 RX ADMIN — ACETAMINOPHEN 650 MG: 325 TABLET ORAL at 21:03

## 2024-04-24 RX ADMIN — POTASSIUM CHLORIDE 20 MEQ: 750 TABLET, FILM COATED, EXTENDED RELEASE ORAL at 09:38

## 2024-04-24 RX ADMIN — PANTOPRAZOLE SODIUM 40 MG: 40 TABLET, DELAYED RELEASE ORAL at 16:18

## 2024-04-24 RX ADMIN — ATORVASTATIN CALCIUM 20 MG: 20 TABLET, FILM COATED ORAL at 09:37

## 2024-04-24 RX ADMIN — FUROSEMIDE 20 MG: 10 INJECTION, SOLUTION INTRAMUSCULAR; INTRAVENOUS at 18:23

## 2024-04-24 RX ADMIN — SUCRALFATE 1 G: 1 TABLET ORAL at 09:34

## 2024-04-24 ASSESSMENT — PAIN SCALES - WONG BAKER
WONGBAKER_NUMERICALRESPONSE: NO HURT
WONGBAKER_NUMERICALRESPONSE: NO HURT

## 2024-04-24 ASSESSMENT — PAIN SCALES - GENERAL
PAINLEVEL_OUTOF10: 0
PAINLEVEL_OUTOF10: 4

## 2024-04-24 ASSESSMENT — PAIN DESCRIPTION - LOCATION
LOCATION: ABDOMEN
LOCATION: BACK

## 2024-04-24 ASSESSMENT — PAIN DESCRIPTION - ORIENTATION: ORIENTATION: MID

## 2024-04-24 ASSESSMENT — PAIN DESCRIPTION - DESCRIPTORS: DESCRIPTORS: ACHING

## 2024-04-25 PROCEDURE — 6360000002 HC RX W HCPCS: Performed by: INTERNAL MEDICINE

## 2024-04-25 PROCEDURE — 2700000000 HC OXYGEN THERAPY PER DAY

## 2024-04-25 PROCEDURE — 6370000000 HC RX 637 (ALT 250 FOR IP): Performed by: STUDENT IN AN ORGANIZED HEALTH CARE EDUCATION/TRAINING PROGRAM

## 2024-04-25 PROCEDURE — 6370000000 HC RX 637 (ALT 250 FOR IP): Performed by: GENERAL ACUTE CARE HOSPITAL

## 2024-04-25 PROCEDURE — 2580000003 HC RX 258: Performed by: INTERNAL MEDICINE

## 2024-04-25 PROCEDURE — 1100000003 HC PRIVATE W/ TELEMETRY

## 2024-04-25 PROCEDURE — 6370000000 HC RX 637 (ALT 250 FOR IP): Performed by: INTERNAL MEDICINE

## 2024-04-25 PROCEDURE — 6370000000 HC RX 637 (ALT 250 FOR IP): Performed by: PHYSICIAN ASSISTANT

## 2024-04-25 PROCEDURE — 6360000002 HC RX W HCPCS: Performed by: NURSE PRACTITIONER

## 2024-04-25 PROCEDURE — 6370000000 HC RX 637 (ALT 250 FOR IP): Performed by: NURSE PRACTITIONER

## 2024-04-25 RX ADMIN — SODIUM CHLORIDE, PRESERVATIVE FREE 5 ML: 5 INJECTION INTRAVENOUS at 08:05

## 2024-04-25 RX ADMIN — MIRTAZAPINE 15 MG: 15 TABLET, FILM COATED ORAL at 20:28

## 2024-04-25 RX ADMIN — DIAZEPAM 1 MG: 2 TABLET ORAL at 20:27

## 2024-04-25 RX ADMIN — SUCRALFATE 1 G: 1 TABLET ORAL at 12:38

## 2024-04-25 RX ADMIN — ACETAMINOPHEN 650 MG: 325 TABLET ORAL at 20:28

## 2024-04-25 RX ADMIN — SUCRALFATE 1 G: 1 TABLET ORAL at 05:01

## 2024-04-25 RX ADMIN — SUCRALFATE 1 G: 1 TABLET ORAL at 20:28

## 2024-04-25 RX ADMIN — METOPROLOL TARTRATE 12.5 MG: 25 TABLET, FILM COATED ORAL at 08:05

## 2024-04-25 RX ADMIN — METOPROLOL TARTRATE 12.5 MG: 25 TABLET, FILM COATED ORAL at 20:28

## 2024-04-25 RX ADMIN — FUROSEMIDE 20 MG: 10 INJECTION, SOLUTION INTRAMUSCULAR; INTRAVENOUS at 17:26

## 2024-04-25 RX ADMIN — SUCRALFATE 1 G: 1 TABLET ORAL at 17:25

## 2024-04-25 RX ADMIN — DIVALPROEX SODIUM 250 MG: 250 TABLET, DELAYED RELEASE ORAL at 08:04

## 2024-04-25 RX ADMIN — OCTREOTIDE ACETATE 50 MCG: 50 INJECTION, SOLUTION INTRAVENOUS; SUBCUTANEOUS at 17:25

## 2024-04-25 RX ADMIN — DIVALPROEX SODIUM 250 MG: 250 TABLET, DELAYED RELEASE ORAL at 20:28

## 2024-04-25 RX ADMIN — ATORVASTATIN CALCIUM 20 MG: 20 TABLET, FILM COATED ORAL at 08:04

## 2024-04-25 RX ADMIN — SENNOSIDES AND DOCUSATE SODIUM 1 TABLET: 50; 8.6 TABLET ORAL at 08:05

## 2024-04-25 RX ADMIN — ACETAMINOPHEN 650 MG: 325 TABLET ORAL at 08:05

## 2024-04-25 RX ADMIN — PANTOPRAZOLE SODIUM 40 MG: 40 TABLET, DELAYED RELEASE ORAL at 17:26

## 2024-04-25 RX ADMIN — SODIUM CHLORIDE, PRESERVATIVE FREE 10 ML: 5 INJECTION INTRAVENOUS at 20:28

## 2024-04-25 RX ADMIN — OCTREOTIDE ACETATE 50 MCG: 50 INJECTION, SOLUTION INTRAVENOUS; SUBCUTANEOUS at 04:55

## 2024-04-25 RX ADMIN — PANTOPRAZOLE SODIUM 40 MG: 40 TABLET, DELAYED RELEASE ORAL at 05:02

## 2024-04-25 RX ADMIN — POTASSIUM CHLORIDE 20 MEQ: 750 TABLET, FILM COATED, EXTENDED RELEASE ORAL at 08:05

## 2024-04-25 RX ADMIN — FUROSEMIDE 20 MG: 10 INJECTION, SOLUTION INTRAMUSCULAR; INTRAVENOUS at 08:05

## 2024-04-26 PROCEDURE — 6370000000 HC RX 637 (ALT 250 FOR IP): Performed by: GENERAL ACUTE CARE HOSPITAL

## 2024-04-26 PROCEDURE — 6370000000 HC RX 637 (ALT 250 FOR IP): Performed by: STUDENT IN AN ORGANIZED HEALTH CARE EDUCATION/TRAINING PROGRAM

## 2024-04-26 PROCEDURE — 6370000000 HC RX 637 (ALT 250 FOR IP): Performed by: NURSE PRACTITIONER

## 2024-04-26 PROCEDURE — 1100000003 HC PRIVATE W/ TELEMETRY

## 2024-04-26 PROCEDURE — 6370000000 HC RX 637 (ALT 250 FOR IP): Performed by: INTERNAL MEDICINE

## 2024-04-26 PROCEDURE — 6360000002 HC RX W HCPCS: Performed by: NURSE PRACTITIONER

## 2024-04-26 PROCEDURE — 2580000003 HC RX 258: Performed by: INTERNAL MEDICINE

## 2024-04-26 PROCEDURE — 6360000002 HC RX W HCPCS: Performed by: INTERNAL MEDICINE

## 2024-04-26 PROCEDURE — 6370000000 HC RX 637 (ALT 250 FOR IP): Performed by: PHYSICIAN ASSISTANT

## 2024-04-26 PROCEDURE — 2700000000 HC OXYGEN THERAPY PER DAY

## 2024-04-26 RX ADMIN — PANTOPRAZOLE SODIUM 40 MG: 40 TABLET, DELAYED RELEASE ORAL at 06:48

## 2024-04-26 RX ADMIN — ATORVASTATIN CALCIUM 20 MG: 20 TABLET, FILM COATED ORAL at 10:20

## 2024-04-26 RX ADMIN — FUROSEMIDE 20 MG: 10 INJECTION, SOLUTION INTRAMUSCULAR; INTRAVENOUS at 10:02

## 2024-04-26 RX ADMIN — SUCRALFATE 1 G: 1 TABLET ORAL at 18:06

## 2024-04-26 RX ADMIN — SENNOSIDES AND DOCUSATE SODIUM 1 TABLET: 50; 8.6 TABLET ORAL at 10:01

## 2024-04-26 RX ADMIN — OCTREOTIDE ACETATE 50 MCG: 50 INJECTION, SOLUTION INTRAVENOUS; SUBCUTANEOUS at 05:38

## 2024-04-26 RX ADMIN — SODIUM CHLORIDE, PRESERVATIVE FREE 10 ML: 5 INJECTION INTRAVENOUS at 10:01

## 2024-04-26 RX ADMIN — POTASSIUM CHLORIDE 20 MEQ: 750 TABLET, FILM COATED, EXTENDED RELEASE ORAL at 10:00

## 2024-04-26 RX ADMIN — SUCRALFATE 1 G: 1 TABLET ORAL at 20:20

## 2024-04-26 RX ADMIN — OCTREOTIDE ACETATE 50 MCG: 50 INJECTION, SOLUTION INTRAVENOUS; SUBCUTANEOUS at 18:06

## 2024-04-26 RX ADMIN — DIAZEPAM 1 MG: 2 TABLET ORAL at 20:19

## 2024-04-26 RX ADMIN — FUROSEMIDE 20 MG: 10 INJECTION, SOLUTION INTRAMUSCULAR; INTRAVENOUS at 18:06

## 2024-04-26 RX ADMIN — DIVALPROEX SODIUM 250 MG: 250 TABLET, DELAYED RELEASE ORAL at 11:27

## 2024-04-26 RX ADMIN — DIVALPROEX SODIUM 250 MG: 250 TABLET, DELAYED RELEASE ORAL at 20:19

## 2024-04-26 RX ADMIN — PANTOPRAZOLE SODIUM 40 MG: 40 TABLET, DELAYED RELEASE ORAL at 18:06

## 2024-04-26 RX ADMIN — METOPROLOL TARTRATE 12.5 MG: 25 TABLET, FILM COATED ORAL at 10:08

## 2024-04-26 RX ADMIN — MIRTAZAPINE 15 MG: 15 TABLET, FILM COATED ORAL at 20:19

## 2024-04-26 RX ADMIN — SUCRALFATE 1 G: 1 TABLET ORAL at 06:48

## 2024-04-26 RX ADMIN — METOPROLOL TARTRATE 12.5 MG: 25 TABLET, FILM COATED ORAL at 20:19

## 2024-04-26 ASSESSMENT — PAIN SCALES - GENERAL: PAINLEVEL_OUTOF10: 0

## 2024-04-27 PROCEDURE — 1100000003 HC PRIVATE W/ TELEMETRY

## 2024-04-27 PROCEDURE — 6370000000 HC RX 637 (ALT 250 FOR IP): Performed by: PHYSICIAN ASSISTANT

## 2024-04-27 PROCEDURE — 6370000000 HC RX 637 (ALT 250 FOR IP): Performed by: NURSE PRACTITIONER

## 2024-04-27 PROCEDURE — 6370000000 HC RX 637 (ALT 250 FOR IP): Performed by: INTERNAL MEDICINE

## 2024-04-27 PROCEDURE — 6370000000 HC RX 637 (ALT 250 FOR IP): Performed by: GENERAL ACUTE CARE HOSPITAL

## 2024-04-27 PROCEDURE — 6370000000 HC RX 637 (ALT 250 FOR IP): Performed by: STUDENT IN AN ORGANIZED HEALTH CARE EDUCATION/TRAINING PROGRAM

## 2024-04-27 PROCEDURE — 6360000002 HC RX W HCPCS: Performed by: NURSE PRACTITIONER

## 2024-04-27 RX ORDER — FUROSEMIDE 20 MG/1
20 TABLET ORAL 2 TIMES DAILY
Status: DISCONTINUED | OUTPATIENT
Start: 2024-04-27 | End: 2024-05-07 | Stop reason: HOSPADM

## 2024-04-27 RX ADMIN — FUROSEMIDE 20 MG: 20 TABLET ORAL at 17:37

## 2024-04-27 RX ADMIN — MIRTAZAPINE 15 MG: 15 TABLET, FILM COATED ORAL at 21:44

## 2024-04-27 RX ADMIN — POTASSIUM CHLORIDE 20 MEQ: 750 TABLET, FILM COATED, EXTENDED RELEASE ORAL at 10:58

## 2024-04-27 RX ADMIN — SUCRALFATE 1 G: 1 TABLET ORAL at 17:37

## 2024-04-27 RX ADMIN — PANTOPRAZOLE SODIUM 40 MG: 40 TABLET, DELAYED RELEASE ORAL at 05:58

## 2024-04-27 RX ADMIN — SUCRALFATE 1 G: 1 TABLET ORAL at 21:44

## 2024-04-27 RX ADMIN — FUROSEMIDE 20 MG: 20 TABLET ORAL at 10:58

## 2024-04-27 RX ADMIN — SENNOSIDES AND DOCUSATE SODIUM 1 TABLET: 50; 8.6 TABLET ORAL at 09:42

## 2024-04-27 RX ADMIN — METOPROLOL TARTRATE 12.5 MG: 25 TABLET, FILM COATED ORAL at 21:44

## 2024-04-27 RX ADMIN — METOPROLOL TARTRATE 12.5 MG: 25 TABLET, FILM COATED ORAL at 09:42

## 2024-04-27 RX ADMIN — ATORVASTATIN CALCIUM 20 MG: 20 TABLET, FILM COATED ORAL at 09:41

## 2024-04-27 RX ADMIN — PANTOPRAZOLE SODIUM 40 MG: 40 TABLET, DELAYED RELEASE ORAL at 17:38

## 2024-04-27 RX ADMIN — SUCRALFATE 1 G: 1 TABLET ORAL at 05:58

## 2024-04-27 RX ADMIN — DIVALPROEX SODIUM 250 MG: 250 TABLET, DELAYED RELEASE ORAL at 21:44

## 2024-04-27 RX ADMIN — OCTREOTIDE ACETATE 50 MCG: 50 INJECTION, SOLUTION INTRAVENOUS; SUBCUTANEOUS at 17:37

## 2024-04-27 RX ADMIN — DIVALPROEX SODIUM 250 MG: 250 TABLET, DELAYED RELEASE ORAL at 09:42

## 2024-04-27 RX ADMIN — OCTREOTIDE ACETATE 50 MCG: 50 INJECTION, SOLUTION INTRAVENOUS; SUBCUTANEOUS at 05:58

## 2024-04-27 RX ADMIN — DIAZEPAM 1 MG: 2 TABLET ORAL at 21:44

## 2024-04-28 LAB
BACTERIA SPEC CULT: NORMAL
BACTERIA SPEC CULT: NORMAL
SERVICE CMNT-IMP: NORMAL
SERVICE CMNT-IMP: NORMAL

## 2024-04-28 PROCEDURE — 6370000000 HC RX 637 (ALT 250 FOR IP): Performed by: STUDENT IN AN ORGANIZED HEALTH CARE EDUCATION/TRAINING PROGRAM

## 2024-04-28 PROCEDURE — 6360000002 HC RX W HCPCS: Performed by: NURSE PRACTITIONER

## 2024-04-28 PROCEDURE — 6370000000 HC RX 637 (ALT 250 FOR IP): Performed by: NURSE PRACTITIONER

## 2024-04-28 PROCEDURE — 6370000000 HC RX 637 (ALT 250 FOR IP): Performed by: INTERNAL MEDICINE

## 2024-04-28 PROCEDURE — 6370000000 HC RX 637 (ALT 250 FOR IP): Performed by: PHYSICIAN ASSISTANT

## 2024-04-28 PROCEDURE — 1100000003 HC PRIVATE W/ TELEMETRY

## 2024-04-28 PROCEDURE — 6370000000 HC RX 637 (ALT 250 FOR IP): Performed by: GENERAL ACUTE CARE HOSPITAL

## 2024-04-28 RX ORDER — FLUCONAZOLE 100 MG/1
200 TABLET ORAL DAILY
Status: COMPLETED | OUTPATIENT
Start: 2024-04-28 | End: 2024-05-02

## 2024-04-28 RX ADMIN — DIVALPROEX SODIUM 250 MG: 250 TABLET, DELAYED RELEASE ORAL at 21:16

## 2024-04-28 RX ADMIN — FLUCONAZOLE 200 MG: 100 TABLET ORAL at 18:37

## 2024-04-28 RX ADMIN — ATORVASTATIN CALCIUM 20 MG: 20 TABLET, FILM COATED ORAL at 08:35

## 2024-04-28 RX ADMIN — METOPROLOL TARTRATE 12.5 MG: 25 TABLET, FILM COATED ORAL at 08:34

## 2024-04-28 RX ADMIN — ACETAMINOPHEN 650 MG: 325 TABLET ORAL at 08:35

## 2024-04-28 RX ADMIN — SUCRALFATE 1 G: 1 TABLET ORAL at 21:15

## 2024-04-28 RX ADMIN — DIAZEPAM 1 MG: 2 TABLET ORAL at 21:16

## 2024-04-28 RX ADMIN — SUCRALFATE 1 G: 1 TABLET ORAL at 12:12

## 2024-04-28 RX ADMIN — OCTREOTIDE ACETATE 50 MCG: 50 INJECTION, SOLUTION INTRAVENOUS; SUBCUTANEOUS at 06:21

## 2024-04-28 RX ADMIN — OCTREOTIDE ACETATE 50 MCG: 50 INJECTION, SOLUTION INTRAVENOUS; SUBCUTANEOUS at 17:00

## 2024-04-28 RX ADMIN — PANTOPRAZOLE SODIUM 40 MG: 40 TABLET, DELAYED RELEASE ORAL at 06:21

## 2024-04-28 RX ADMIN — SUCRALFATE 1 G: 1 TABLET ORAL at 06:21

## 2024-04-28 RX ADMIN — ACETAMINOPHEN 650 MG: 325 TABLET ORAL at 21:15

## 2024-04-28 RX ADMIN — SUCRALFATE 1 G: 1 TABLET ORAL at 17:00

## 2024-04-28 RX ADMIN — DIVALPROEX SODIUM 250 MG: 250 TABLET, DELAYED RELEASE ORAL at 08:34

## 2024-04-28 RX ADMIN — PANTOPRAZOLE SODIUM 40 MG: 40 TABLET, DELAYED RELEASE ORAL at 17:00

## 2024-04-28 RX ADMIN — MIRTAZAPINE 15 MG: 15 TABLET, FILM COATED ORAL at 21:15

## 2024-04-28 ASSESSMENT — PAIN SCALES - GENERAL: PAINLEVEL_OUTOF10: 0

## 2024-04-29 PROCEDURE — 1100000003 HC PRIVATE W/ TELEMETRY

## 2024-04-29 PROCEDURE — 6370000000 HC RX 637 (ALT 250 FOR IP): Performed by: PHYSICIAN ASSISTANT

## 2024-04-29 PROCEDURE — 6370000000 HC RX 637 (ALT 250 FOR IP): Performed by: NURSE PRACTITIONER

## 2024-04-29 PROCEDURE — 6370000000 HC RX 637 (ALT 250 FOR IP): Performed by: INTERNAL MEDICINE

## 2024-04-29 PROCEDURE — 6370000000 HC RX 637 (ALT 250 FOR IP): Performed by: GENERAL ACUTE CARE HOSPITAL

## 2024-04-29 PROCEDURE — 6360000002 HC RX W HCPCS: Performed by: NURSE PRACTITIONER

## 2024-04-29 RX ADMIN — ACETAMINOPHEN 650 MG: 325 TABLET ORAL at 17:07

## 2024-04-29 RX ADMIN — MIRTAZAPINE 15 MG: 15 TABLET, FILM COATED ORAL at 20:46

## 2024-04-29 RX ADMIN — SUCRALFATE 1 G: 1 TABLET ORAL at 17:08

## 2024-04-29 RX ADMIN — DIVALPROEX SODIUM 250 MG: 250 TABLET, DELAYED RELEASE ORAL at 20:46

## 2024-04-29 RX ADMIN — FLUCONAZOLE 200 MG: 100 TABLET ORAL at 09:32

## 2024-04-29 RX ADMIN — PANTOPRAZOLE SODIUM 40 MG: 40 TABLET, DELAYED RELEASE ORAL at 06:39

## 2024-04-29 RX ADMIN — SUCRALFATE 1 G: 1 TABLET ORAL at 09:35

## 2024-04-29 RX ADMIN — SUCRALFATE 1 G: 1 TABLET ORAL at 06:39

## 2024-04-29 RX ADMIN — PANTOPRAZOLE SODIUM 40 MG: 40 TABLET, DELAYED RELEASE ORAL at 17:08

## 2024-04-29 RX ADMIN — SUCRALFATE 1 G: 1 TABLET ORAL at 20:46

## 2024-04-29 RX ADMIN — SENNOSIDES AND DOCUSATE SODIUM 1 TABLET: 50; 8.6 TABLET ORAL at 09:32

## 2024-04-29 RX ADMIN — OCTREOTIDE ACETATE 50 MCG: 50 INJECTION, SOLUTION INTRAVENOUS; SUBCUTANEOUS at 06:39

## 2024-04-29 RX ADMIN — DIVALPROEX SODIUM 250 MG: 250 TABLET, DELAYED RELEASE ORAL at 09:35

## 2024-04-29 RX ADMIN — DIAZEPAM 1 MG: 2 TABLET ORAL at 20:46

## 2024-04-29 ASSESSMENT — PAIN SCALES - GENERAL: PAINLEVEL_OUTOF10: 0

## 2024-04-30 PROCEDURE — 1100000003 HC PRIVATE W/ TELEMETRY

## 2024-04-30 PROCEDURE — 6370000000 HC RX 637 (ALT 250 FOR IP): Performed by: NURSE PRACTITIONER

## 2024-04-30 PROCEDURE — 6370000000 HC RX 637 (ALT 250 FOR IP): Performed by: INTERNAL MEDICINE

## 2024-04-30 PROCEDURE — 6370000000 HC RX 637 (ALT 250 FOR IP): Performed by: GENERAL ACUTE CARE HOSPITAL

## 2024-04-30 PROCEDURE — 6370000000 HC RX 637 (ALT 250 FOR IP): Performed by: PHYSICIAN ASSISTANT

## 2024-04-30 PROCEDURE — 6360000002 HC RX W HCPCS: Performed by: NURSE PRACTITIONER

## 2024-04-30 RX ADMIN — SUCRALFATE 1 G: 1 TABLET ORAL at 20:44

## 2024-04-30 RX ADMIN — OCTREOTIDE ACETATE 50 MCG: 50 INJECTION, SOLUTION INTRAVENOUS; SUBCUTANEOUS at 06:25

## 2024-04-30 RX ADMIN — DIVALPROEX SODIUM 250 MG: 250 TABLET, DELAYED RELEASE ORAL at 09:31

## 2024-04-30 RX ADMIN — SENNOSIDES AND DOCUSATE SODIUM 1 TABLET: 50; 8.6 TABLET ORAL at 09:31

## 2024-04-30 RX ADMIN — PANTOPRAZOLE SODIUM 40 MG: 40 TABLET, DELAYED RELEASE ORAL at 06:25

## 2024-04-30 RX ADMIN — SUCRALFATE 1 G: 1 TABLET ORAL at 11:09

## 2024-04-30 RX ADMIN — SUCRALFATE 1 G: 1 TABLET ORAL at 17:48

## 2024-04-30 RX ADMIN — OCTREOTIDE ACETATE 50 MCG: 50 INJECTION, SOLUTION INTRAVENOUS; SUBCUTANEOUS at 17:49

## 2024-04-30 RX ADMIN — PANTOPRAZOLE SODIUM 40 MG: 40 TABLET, DELAYED RELEASE ORAL at 17:48

## 2024-04-30 RX ADMIN — MIRTAZAPINE 15 MG: 15 TABLET, FILM COATED ORAL at 20:44

## 2024-04-30 RX ADMIN — DIAZEPAM 1 MG: 2 TABLET ORAL at 20:44

## 2024-04-30 RX ADMIN — FLUCONAZOLE 200 MG: 100 TABLET ORAL at 09:31

## 2024-04-30 RX ADMIN — DIVALPROEX SODIUM 250 MG: 250 TABLET, DELAYED RELEASE ORAL at 20:44

## 2024-04-30 RX ADMIN — SUCRALFATE 1 G: 1 TABLET ORAL at 06:25

## 2024-04-30 ASSESSMENT — PAIN SCALES - GENERAL: PAINLEVEL_OUTOF10: 0

## 2024-05-01 ENCOUNTER — APPOINTMENT (OUTPATIENT)
Facility: HOSPITAL | Age: 69
DRG: 853 | End: 2024-05-01
Payer: COMMERCIAL

## 2024-05-01 ENCOUNTER — CLINICAL DOCUMENTATION (OUTPATIENT)
Age: 69
End: 2024-05-01

## 2024-05-01 LAB
ANION GAP SERPL CALC-SCNC: 7 MMOL/L (ref 5–15)
B PERT DNA SPEC QL NAA+PROBE: NOT DETECTED
BASOPHILS # BLD: 0.1 K/UL (ref 0–0.1)
BASOPHILS NFR BLD: 0 % (ref 0–1)
BORDETELLA PARAPERTUSSIS BY PCR: NOT DETECTED
BUN SERPL-MCNC: 39 MG/DL (ref 6–20)
BUN/CREAT SERPL: 33 (ref 12–20)
C PNEUM DNA SPEC QL NAA+PROBE: NOT DETECTED
CALCIUM SERPL-MCNC: 9.4 MG/DL (ref 8.5–10.1)
CHLORIDE SERPL-SCNC: 96 MMOL/L (ref 97–108)
CO2 SERPL-SCNC: 31 MMOL/L (ref 21–32)
CREAT SERPL-MCNC: 1.2 MG/DL (ref 0.55–1.02)
DIFFERENTIAL METHOD BLD: ABNORMAL
EKG ATRIAL RATE: 148 BPM
EKG DIAGNOSIS: NORMAL
EKG P-R INTERVAL: 104 MS
EKG Q-T INTERVAL: 334 MS
EKG QRS DURATION: 162 MS
EKG QTC CALCULATION (BAZETT): 524 MS
EKG R AXIS: -9 DEGREES
EKG T AXIS: -77 DEGREES
EKG VENTRICULAR RATE: 148 BPM
EOSINOPHIL # BLD: 0 K/UL (ref 0–0.4)
EOSINOPHIL NFR BLD: 0 % (ref 0–7)
ERYTHROCYTE [DISTWIDTH] IN BLOOD BY AUTOMATED COUNT: 19.7 % (ref 11.5–14.5)
FLUAV SUBTYP SPEC NAA+PROBE: NOT DETECTED
FLUBV RNA SPEC QL NAA+PROBE: NOT DETECTED
GLUCOSE SERPL-MCNC: 98 MG/DL (ref 65–100)
HADV DNA SPEC QL NAA+PROBE: NOT DETECTED
HCOV 229E RNA SPEC QL NAA+PROBE: NOT DETECTED
HCOV HKU1 RNA SPEC QL NAA+PROBE: NOT DETECTED
HCOV NL63 RNA SPEC QL NAA+PROBE: NOT DETECTED
HCOV OC43 RNA SPEC QL NAA+PROBE: NOT DETECTED
HCT VFR BLD AUTO: 26 % (ref 35–47)
HGB BLD-MCNC: 8.2 G/DL (ref 11.5–16)
HMPV RNA SPEC QL NAA+PROBE: NOT DETECTED
HPIV1 RNA SPEC QL NAA+PROBE: NOT DETECTED
HPIV2 RNA SPEC QL NAA+PROBE: NOT DETECTED
HPIV3 RNA SPEC QL NAA+PROBE: NOT DETECTED
HPIV4 RNA SPEC QL NAA+PROBE: NOT DETECTED
IMM GRANULOCYTES # BLD AUTO: 0.3 K/UL (ref 0–0.04)
IMM GRANULOCYTES NFR BLD AUTO: 2 % (ref 0–0.5)
LYMPHOCYTES # BLD: 1.7 K/UL (ref 0.8–3.5)
LYMPHOCYTES NFR BLD: 9 % (ref 12–49)
M PNEUMO DNA SPEC QL NAA+PROBE: NOT DETECTED
MCH RBC QN AUTO: 28.5 PG (ref 26–34)
MCHC RBC AUTO-ENTMCNC: 31.5 G/DL (ref 30–36.5)
MCV RBC AUTO: 90.3 FL (ref 80–99)
MONOCYTES # BLD: 1.8 K/UL (ref 0–1)
MONOCYTES NFR BLD: 10 % (ref 5–13)
NEUTS SEG # BLD: 14.8 K/UL (ref 1.8–8)
NEUTS SEG NFR BLD: 79 % (ref 32–75)
NRBC # BLD: 0 K/UL (ref 0–0.01)
NRBC BLD-RTO: 0 PER 100 WBC
PLATELET # BLD AUTO: 440 K/UL (ref 150–400)
PMV BLD AUTO: 10.8 FL (ref 8.9–12.9)
POTASSIUM SERPL-SCNC: 3.4 MMOL/L (ref 3.5–5.1)
PROCALCITONIN SERPL-MCNC: 1.37 NG/ML
RBC # BLD AUTO: 2.88 M/UL (ref 3.8–5.2)
RSV RNA SPEC QL NAA+PROBE: NOT DETECTED
RV+EV RNA SPEC QL NAA+PROBE: NOT DETECTED
SARS-COV-2 RNA RESP QL NAA+PROBE: NOT DETECTED
SODIUM SERPL-SCNC: 134 MMOL/L (ref 136–145)
WBC # BLD AUTO: 18.6 K/UL (ref 3.6–11)

## 2024-05-01 PROCEDURE — 6370000000 HC RX 637 (ALT 250 FOR IP): Performed by: PHYSICIAN ASSISTANT

## 2024-05-01 PROCEDURE — 85025 COMPLETE CBC W/AUTO DIFF WBC: CPT

## 2024-05-01 PROCEDURE — 0202U NFCT DS 22 TRGT SARS-COV-2: CPT

## 2024-05-01 PROCEDURE — 2580000003 HC RX 258: Performed by: INTERNAL MEDICINE

## 2024-05-01 PROCEDURE — 2060000000 HC ICU INTERMEDIATE R&B

## 2024-05-01 PROCEDURE — 6370000000 HC RX 637 (ALT 250 FOR IP): Performed by: NURSE PRACTITIONER

## 2024-05-01 PROCEDURE — 6360000002 HC RX W HCPCS: Performed by: NURSE PRACTITIONER

## 2024-05-01 PROCEDURE — 71045 X-RAY EXAM CHEST 1 VIEW: CPT

## 2024-05-01 PROCEDURE — 6360000002 HC RX W HCPCS: Performed by: INTERNAL MEDICINE

## 2024-05-01 PROCEDURE — 2500000003 HC RX 250 WO HCPCS: Performed by: INTERNAL MEDICINE

## 2024-05-01 PROCEDURE — 6370000000 HC RX 637 (ALT 250 FOR IP): Performed by: INTERNAL MEDICINE

## 2024-05-01 PROCEDURE — 87040 BLOOD CULTURE FOR BACTERIA: CPT

## 2024-05-01 PROCEDURE — 36415 COLL VENOUS BLD VENIPUNCTURE: CPT

## 2024-05-01 PROCEDURE — 80048 BASIC METABOLIC PNL TOTAL CA: CPT

## 2024-05-01 PROCEDURE — 84145 PROCALCITONIN (PCT): CPT

## 2024-05-01 RX ORDER — DOXYCYCLINE HYCLATE 100 MG
100 TABLET ORAL EVERY 12 HOURS SCHEDULED
Status: DISCONTINUED | OUTPATIENT
Start: 2024-05-01 | End: 2024-05-01

## 2024-05-01 RX ORDER — 0.9 % SODIUM CHLORIDE 0.9 %
500 INTRAVENOUS SOLUTION INTRAVENOUS ONCE
Status: COMPLETED | OUTPATIENT
Start: 2024-05-01 | End: 2024-05-01

## 2024-05-01 RX ORDER — SODIUM CHLORIDE 9 MG/ML
INJECTION, SOLUTION INTRAVENOUS CONTINUOUS
Status: DISCONTINUED | OUTPATIENT
Start: 2024-05-01 | End: 2024-05-05

## 2024-05-01 RX ORDER — POTASSIUM CHLORIDE 20 MEQ/1
20 TABLET, EXTENDED RELEASE ORAL ONCE
Status: COMPLETED | OUTPATIENT
Start: 2024-05-01 | End: 2024-05-01

## 2024-05-01 RX ORDER — SODIUM CHLORIDE 9 MG/ML
INJECTION, SOLUTION INTRAVENOUS CONTINUOUS
Status: DISCONTINUED | OUTPATIENT
Start: 2024-05-01 | End: 2024-05-01

## 2024-05-01 RX ORDER — METOPROLOL TARTRATE 1 MG/ML
5 INJECTION, SOLUTION INTRAVENOUS EVERY 8 HOURS PRN
Status: DISCONTINUED | OUTPATIENT
Start: 2024-05-01 | End: 2024-05-07 | Stop reason: HOSPADM

## 2024-05-01 RX ORDER — AMOXICILLIN AND CLAVULANATE POTASSIUM 875; 125 MG/1; MG/1
1 TABLET, FILM COATED ORAL EVERY 12 HOURS SCHEDULED
Status: DISCONTINUED | OUTPATIENT
Start: 2024-05-01 | End: 2024-05-01

## 2024-05-01 RX ADMIN — OCTREOTIDE ACETATE 50 MCG: 50 INJECTION, SOLUTION INTRAVENOUS; SUBCUTANEOUS at 22:30

## 2024-05-01 RX ADMIN — DIVALPROEX SODIUM 250 MG: 250 TABLET, DELAYED RELEASE ORAL at 20:30

## 2024-05-01 RX ADMIN — METOPROLOL TARTRATE 12.5 MG: 25 TABLET, FILM COATED ORAL at 18:11

## 2024-05-01 RX ADMIN — POTASSIUM CHLORIDE 20 MEQ: 1500 TABLET, EXTENDED RELEASE ORAL at 11:48

## 2024-05-01 RX ADMIN — SODIUM CHLORIDE 1500 MG: 9 INJECTION, SOLUTION INTRAVENOUS at 23:05

## 2024-05-01 RX ADMIN — PIPERACILLIN AND TAZOBACTAM 4500 MG: 4; .5 INJECTION, POWDER, LYOPHILIZED, FOR SOLUTION INTRAVENOUS at 23:14

## 2024-05-01 RX ADMIN — SODIUM CHLORIDE 500 ML: 9 INJECTION, SOLUTION INTRAVENOUS at 18:45

## 2024-05-01 RX ADMIN — ACETAMINOPHEN 650 MG: 325 TABLET ORAL at 10:03

## 2024-05-01 RX ADMIN — SODIUM CHLORIDE: 9 INJECTION, SOLUTION INTRAVENOUS at 22:27

## 2024-05-01 RX ADMIN — METOPROLOL TARTRATE 5 MG: 5 INJECTION INTRAVENOUS at 19:56

## 2024-05-01 RX ADMIN — MIRTAZAPINE 15 MG: 15 TABLET, FILM COATED ORAL at 20:30

## 2024-05-01 RX ADMIN — DIAZEPAM 1 MG: 2 TABLET ORAL at 20:28

## 2024-05-01 RX ADMIN — FLUCONAZOLE 200 MG: 100 TABLET ORAL at 10:02

## 2024-05-01 RX ADMIN — SUCRALFATE 1 G: 1 TABLET ORAL at 11:48

## 2024-05-01 RX ADMIN — PANTOPRAZOLE SODIUM 40 MG: 40 TABLET, DELAYED RELEASE ORAL at 22:31

## 2024-05-01 RX ADMIN — SENNOSIDES AND DOCUSATE SODIUM 1 TABLET: 50; 8.6 TABLET ORAL at 10:01

## 2024-05-01 RX ADMIN — SUCRALFATE 1 G: 1 TABLET ORAL at 20:29

## 2024-05-01 RX ADMIN — SODIUM CHLORIDE, PRESERVATIVE FREE 10 ML: 5 INJECTION INTRAVENOUS at 22:27

## 2024-05-01 RX ADMIN — DIVALPROEX SODIUM 250 MG: 250 TABLET, DELAYED RELEASE ORAL at 10:01

## 2024-05-01 RX ADMIN — ACETAMINOPHEN 650 MG: 325 TABLET ORAL at 20:27

## 2024-05-01 ASSESSMENT — PAIN SCALES - GENERAL: PAINLEVEL_OUTOF10: 0

## 2024-05-01 NOTE — PROGRESS NOTES
Palliative Medicine SW Note    Palliative SW returned call to patient's sister-in-law, Orin, who was at work. This writer provided empathetic listening as she shared concerns for patient's smooth transition to LTC SNF with hospice and this writer relayed to her that my colleague, Dr. Kay had spoken with NIK Leon, who was reaching out to [facility] to confirm SNF and hospice choice and proceed with LTC placement. She expressed appreciation and understanding that Palliative team will not follow patient at SNF, but that hospice team will be an additional layer of support to patient and family in providing for her care through [facility].     Thank you for including Palliative team in the care of Ms. Lillie Claire.    Irma Duckworth, [facility]  Palliative Medicine [phone] ([phone])

## 2024-05-02 ENCOUNTER — APPOINTMENT (OUTPATIENT)
Facility: HOSPITAL | Age: 69
DRG: 853 | End: 2024-05-02
Payer: COMMERCIAL

## 2024-05-02 LAB
ANION GAP SERPL CALC-SCNC: 7 MMOL/L (ref 5–15)
BASOPHILS # BLD: 0 K/UL (ref 0–0.1)
BASOPHILS NFR BLD: 0 % (ref 0–1)
BUN SERPL-MCNC: 44 MG/DL (ref 6–20)
BUN/CREAT SERPL: 31 (ref 12–20)
CALCIUM SERPL-MCNC: 9 MG/DL (ref 8.5–10.1)
CHLORIDE SERPL-SCNC: 99 MMOL/L (ref 97–108)
CO2 SERPL-SCNC: 32 MMOL/L (ref 21–32)
CREAT SERPL-MCNC: 1.42 MG/DL (ref 0.55–1.02)
DIFFERENTIAL METHOD BLD: ABNORMAL
EOSINOPHIL # BLD: 0 K/UL (ref 0–0.4)
EOSINOPHIL NFR BLD: 0 % (ref 0–7)
ERYTHROCYTE [DISTWIDTH] IN BLOOD BY AUTOMATED COUNT: 19.3 % (ref 11.5–14.5)
GLUCOSE SERPL-MCNC: 100 MG/DL (ref 65–100)
HCT VFR BLD AUTO: 21.3 % (ref 35–47)
HCT VFR BLD AUTO: 22.4 % (ref 35–47)
HGB BLD-MCNC: 6.5 G/DL (ref 11.5–16)
HGB BLD-MCNC: 7 G/DL (ref 11.5–16)
IMM GRANULOCYTES # BLD AUTO: 0.3 K/UL (ref 0–0.04)
IMM GRANULOCYTES NFR BLD AUTO: 2 % (ref 0–0.5)
LACTATE SERPL-SCNC: 1 MMOL/L (ref 0.4–2)
LYMPHOCYTES # BLD: 1 K/UL (ref 0.8–3.5)
LYMPHOCYTES NFR BLD: 6 % (ref 12–49)
MCH RBC QN AUTO: 28.5 PG (ref 26–34)
MCHC RBC AUTO-ENTMCNC: 30.5 G/DL (ref 30–36.5)
MCV RBC AUTO: 93.4 FL (ref 80–99)
MONOCYTES # BLD: 1.6 K/UL (ref 0–1)
MONOCYTES NFR BLD: 10 % (ref 5–13)
NEUTS SEG # BLD: 13.2 K/UL (ref 1.8–8)
NEUTS SEG NFR BLD: 82 % (ref 32–75)
NRBC # BLD: 0 K/UL (ref 0–0.01)
NRBC BLD-RTO: 0 PER 100 WBC
PLATELET # BLD AUTO: 364 K/UL (ref 150–400)
PLATELET COMMENT: ABNORMAL
PMV BLD AUTO: 10.6 FL (ref 8.9–12.9)
POTASSIUM SERPL-SCNC: 3.1 MMOL/L (ref 3.5–5.1)
RBC # BLD AUTO: 2.28 M/UL (ref 3.8–5.2)
RBC MORPH BLD: ABNORMAL
RBC MORPH BLD: ABNORMAL
SODIUM SERPL-SCNC: 138 MMOL/L (ref 136–145)
WBC # BLD AUTO: 16.1 K/UL (ref 3.6–11)

## 2024-05-02 PROCEDURE — 6370000000 HC RX 637 (ALT 250 FOR IP): Performed by: NURSE PRACTITIONER

## 2024-05-02 PROCEDURE — 85018 HEMOGLOBIN: CPT

## 2024-05-02 PROCEDURE — 6370000000 HC RX 637 (ALT 250 FOR IP): Performed by: INTERNAL MEDICINE

## 2024-05-02 PROCEDURE — 85025 COMPLETE CBC W/AUTO DIFF WBC: CPT

## 2024-05-02 PROCEDURE — 36415 COLL VENOUS BLD VENIPUNCTURE: CPT

## 2024-05-02 PROCEDURE — 6360000002 HC RX W HCPCS: Performed by: INTERNAL MEDICINE

## 2024-05-02 PROCEDURE — 76770 US EXAM ABDO BACK WALL COMP: CPT

## 2024-05-02 PROCEDURE — 85014 HEMATOCRIT: CPT

## 2024-05-02 PROCEDURE — 83605 ASSAY OF LACTIC ACID: CPT

## 2024-05-02 PROCEDURE — 2700000000 HC OXYGEN THERAPY PER DAY

## 2024-05-02 PROCEDURE — 80048 BASIC METABOLIC PNL TOTAL CA: CPT

## 2024-05-02 PROCEDURE — 6360000002 HC RX W HCPCS: Performed by: NURSE PRACTITIONER

## 2024-05-02 PROCEDURE — 6370000000 HC RX 637 (ALT 250 FOR IP): Performed by: PHYSICIAN ASSISTANT

## 2024-05-02 PROCEDURE — 2580000003 HC RX 258: Performed by: INTERNAL MEDICINE

## 2024-05-02 PROCEDURE — 2060000000 HC ICU INTERMEDIATE R&B

## 2024-05-02 RX ADMIN — FLUCONAZOLE 200 MG: 100 TABLET ORAL at 09:32

## 2024-05-02 RX ADMIN — SENNOSIDES AND DOCUSATE SODIUM 1 TABLET: 50; 8.6 TABLET ORAL at 09:34

## 2024-05-02 RX ADMIN — VANCOMYCIN HYDROCHLORIDE 750 MG: 750 INJECTION, POWDER, LYOPHILIZED, FOR SOLUTION INTRAVENOUS at 23:00

## 2024-05-02 RX ADMIN — DIVALPROEX SODIUM 250 MG: 250 TABLET, DELAYED RELEASE ORAL at 09:34

## 2024-05-02 RX ADMIN — DIAZEPAM 1 MG: 2 TABLET ORAL at 22:49

## 2024-05-02 RX ADMIN — MIRTAZAPINE 15 MG: 15 TABLET, FILM COATED ORAL at 22:49

## 2024-05-02 RX ADMIN — METOPROLOL TARTRATE 12.5 MG: 25 TABLET, FILM COATED ORAL at 22:49

## 2024-05-02 RX ADMIN — OCTREOTIDE ACETATE 50 MCG: 50 INJECTION, SOLUTION INTRAVENOUS; SUBCUTANEOUS at 16:54

## 2024-05-02 RX ADMIN — PANTOPRAZOLE SODIUM 40 MG: 40 TABLET, DELAYED RELEASE ORAL at 16:54

## 2024-05-02 RX ADMIN — SUCRALFATE 1 G: 1 TABLET ORAL at 22:48

## 2024-05-02 RX ADMIN — SUCRALFATE 1 G: 1 TABLET ORAL at 09:34

## 2024-05-02 RX ADMIN — SUCRALFATE 1 G: 1 TABLET ORAL at 02:12

## 2024-05-02 RX ADMIN — METOPROLOL TARTRATE 12.5 MG: 25 TABLET, FILM COATED ORAL at 09:32

## 2024-05-02 RX ADMIN — SUCRALFATE 1 G: 1 TABLET ORAL at 16:54

## 2024-05-02 RX ADMIN — SUCRALFATE 1 G: 1 TABLET ORAL at 06:21

## 2024-05-02 RX ADMIN — SODIUM CHLORIDE, PRESERVATIVE FREE 10 ML: 5 INJECTION INTRAVENOUS at 09:36

## 2024-05-02 RX ADMIN — PIPERACILLIN AND TAZOBACTAM 3375 MG: 3; .375 INJECTION, POWDER, LYOPHILIZED, FOR SOLUTION INTRAVENOUS at 05:33

## 2024-05-02 RX ADMIN — SODIUM CHLORIDE, PRESERVATIVE FREE 10 ML: 5 INJECTION INTRAVENOUS at 22:50

## 2024-05-02 RX ADMIN — PANTOPRAZOLE SODIUM 40 MG: 40 TABLET, DELAYED RELEASE ORAL at 06:21

## 2024-05-02 RX ADMIN — OCTREOTIDE ACETATE 50 MCG: 50 INJECTION, SOLUTION INTRAVENOUS; SUBCUTANEOUS at 10:59

## 2024-05-02 RX ADMIN — SODIUM CHLORIDE: 9 INJECTION, SOLUTION INTRAVENOUS at 18:13

## 2024-05-02 RX ADMIN — PIPERACILLIN AND TAZOBACTAM 3375 MG: 3; .375 INJECTION, POWDER, LYOPHILIZED, FOR SOLUTION INTRAVENOUS at 13:26

## 2024-05-02 RX ADMIN — DIVALPROEX SODIUM 250 MG: 250 TABLET, DELAYED RELEASE ORAL at 22:57

## 2024-05-02 RX ADMIN — POTASSIUM BICARBONATE 40 MEQ: 782 TABLET, EFFERVESCENT ORAL at 09:33

## 2024-05-02 ASSESSMENT — PAIN SCALES - GENERAL: PAINLEVEL_OUTOF10: 0

## 2024-05-03 LAB
ANION GAP SERPL CALC-SCNC: 5 MMOL/L (ref 5–15)
ANION GAP SERPL CALC-SCNC: 7 MMOL/L (ref 5–15)
BASOPHILS # BLD: 0 K/UL (ref 0–0.1)
BASOPHILS # BLD: 0 K/UL (ref 0–0.1)
BASOPHILS NFR BLD: 0 % (ref 0–1)
BASOPHILS NFR BLD: 0 % (ref 0–1)
BUN SERPL-MCNC: 27 MG/DL (ref 6–20)
BUN SERPL-MCNC: 38 MG/DL (ref 6–20)
BUN/CREAT SERPL: 27 (ref 12–20)
BUN/CREAT SERPL: 31 (ref 12–20)
CALCIUM SERPL-MCNC: 8.4 MG/DL (ref 8.5–10.1)
CALCIUM SERPL-MCNC: 8.9 MG/DL (ref 8.5–10.1)
CHLORIDE SERPL-SCNC: 105 MMOL/L (ref 97–108)
CHLORIDE SERPL-SCNC: 108 MMOL/L (ref 97–108)
CO2 SERPL-SCNC: 29 MMOL/L (ref 21–32)
CO2 SERPL-SCNC: 31 MMOL/L (ref 21–32)
CREAT SERPL-MCNC: 1.01 MG/DL (ref 0.55–1.02)
CREAT SERPL-MCNC: 1.22 MG/DL (ref 0.55–1.02)
DIFFERENTIAL METHOD BLD: ABNORMAL
DIFFERENTIAL METHOD BLD: ABNORMAL
EOSINOPHIL # BLD: 0.3 K/UL (ref 0–0.4)
EOSINOPHIL # BLD: 0.5 K/UL (ref 0–0.4)
EOSINOPHIL NFR BLD: 2 % (ref 0–7)
EOSINOPHIL NFR BLD: 3 % (ref 0–7)
ERYTHROCYTE [DISTWIDTH] IN BLOOD BY AUTOMATED COUNT: 19.2 % (ref 11.5–14.5)
ERYTHROCYTE [DISTWIDTH] IN BLOOD BY AUTOMATED COUNT: 19.5 % (ref 11.5–14.5)
GLUCOSE SERPL-MCNC: 104 MG/DL (ref 65–100)
GLUCOSE SERPL-MCNC: 146 MG/DL (ref 65–100)
HCT VFR BLD AUTO: 18.7 % (ref 35–47)
HCT VFR BLD AUTO: 19.3 % (ref 35–47)
HGB BLD-MCNC: 5.7 G/DL (ref 11.5–16)
HGB BLD-MCNC: 5.8 G/DL (ref 11.5–16)
HISTORY CHECK: NORMAL
HISTORY CHECK: NORMAL
IMM GRANULOCYTES # BLD AUTO: 0.3 K/UL (ref 0–0.04)
IMM GRANULOCYTES # BLD AUTO: 0.3 K/UL (ref 0–0.04)
IMM GRANULOCYTES NFR BLD AUTO: 2 % (ref 0–0.5)
IMM GRANULOCYTES NFR BLD AUTO: 2 % (ref 0–0.5)
LYMPHOCYTES # BLD: 1.1 K/UL (ref 0.8–3.5)
LYMPHOCYTES # BLD: 1.3 K/UL (ref 0.8–3.5)
LYMPHOCYTES NFR BLD: 7 % (ref 12–49)
LYMPHOCYTES NFR BLD: 8 % (ref 12–49)
MAGNESIUM SERPL-MCNC: 1.7 MG/DL (ref 1.6–2.4)
MAGNESIUM SERPL-MCNC: 1.7 MG/DL (ref 1.6–2.4)
MCH RBC QN AUTO: 28.2 PG (ref 26–34)
MCH RBC QN AUTO: 28.4 PG (ref 26–34)
MCHC RBC AUTO-ENTMCNC: 30.1 G/DL (ref 30–36.5)
MCHC RBC AUTO-ENTMCNC: 30.5 G/DL (ref 30–36.5)
MCV RBC AUTO: 92.6 FL (ref 80–99)
MCV RBC AUTO: 94.6 FL (ref 80–99)
MONOCYTES # BLD: 1.2 K/UL (ref 0–1)
MONOCYTES # BLD: 1.5 K/UL (ref 0–1)
MONOCYTES NFR BLD: 10 % (ref 5–13)
MONOCYTES NFR BLD: 8 % (ref 5–13)
NEUTS SEG # BLD: 11.9 K/UL (ref 1.8–8)
NEUTS SEG # BLD: 12.1 K/UL (ref 1.8–8)
NEUTS SEG NFR BLD: 78 % (ref 32–75)
NEUTS SEG NFR BLD: 80 % (ref 32–75)
NRBC # BLD: 0 K/UL (ref 0–0.01)
NRBC # BLD: 0.02 K/UL (ref 0–0.01)
NRBC BLD-RTO: 0 PER 100 WBC
NRBC BLD-RTO: 0.1 PER 100 WBC
PHOSPHATE SERPL-MCNC: 2.2 MG/DL (ref 2.6–4.7)
PLATELET # BLD AUTO: 381 K/UL (ref 150–400)
PLATELET # BLD AUTO: 382 K/UL (ref 150–400)
PMV BLD AUTO: 10.7 FL (ref 8.9–12.9)
PMV BLD AUTO: 10.8 FL (ref 8.9–12.9)
POTASSIUM SERPL-SCNC: 2.9 MMOL/L (ref 3.5–5.1)
POTASSIUM SERPL-SCNC: 3.3 MMOL/L (ref 3.5–5.1)
RBC # BLD AUTO: 2.02 M/UL (ref 3.8–5.2)
RBC # BLD AUTO: 2.04 M/UL (ref 3.8–5.2)
RBC MORPH BLD: ABNORMAL
SODIUM SERPL-SCNC: 141 MMOL/L (ref 136–145)
SODIUM SERPL-SCNC: 144 MMOL/L (ref 136–145)
VANCOMYCIN SERPL-MCNC: 13.7 UG/ML
WBC # BLD AUTO: 15.2 K/UL (ref 3.6–11)
WBC # BLD AUTO: 15.3 K/UL (ref 3.6–11)

## 2024-05-03 PROCEDURE — 86900 BLOOD TYPING SEROLOGIC ABO: CPT

## 2024-05-03 PROCEDURE — 2060000000 HC ICU INTERMEDIATE R&B

## 2024-05-03 PROCEDURE — 86850 RBC ANTIBODY SCREEN: CPT

## 2024-05-03 PROCEDURE — 6360000002 HC RX W HCPCS: Performed by: HOSPITALIST

## 2024-05-03 PROCEDURE — 86901 BLOOD TYPING SEROLOGIC RH(D): CPT

## 2024-05-03 PROCEDURE — 36415 COLL VENOUS BLD VENIPUNCTURE: CPT

## 2024-05-03 PROCEDURE — 86870 RBC ANTIBODY IDENTIFICATION: CPT

## 2024-05-03 PROCEDURE — 36430 TRANSFUSION BLD/BLD COMPNT: CPT

## 2024-05-03 PROCEDURE — 6370000000 HC RX 637 (ALT 250 FOR IP): Performed by: INTERNAL MEDICINE

## 2024-05-03 PROCEDURE — 80048 BASIC METABOLIC PNL TOTAL CA: CPT

## 2024-05-03 PROCEDURE — P9016 RBC LEUKOCYTES REDUCED: HCPCS

## 2024-05-03 PROCEDURE — 93005 ELECTROCARDIOGRAM TRACING: CPT | Performed by: NURSE PRACTITIONER

## 2024-05-03 PROCEDURE — 84100 ASSAY OF PHOSPHORUS: CPT

## 2024-05-03 PROCEDURE — 86922 COMPATIBILITY TEST ANTIGLOB: CPT

## 2024-05-03 PROCEDURE — 6360000002 HC RX W HCPCS: Performed by: INTERNAL MEDICINE

## 2024-05-03 PROCEDURE — 2580000003 HC RX 258: Performed by: INTERNAL MEDICINE

## 2024-05-03 PROCEDURE — 86920 COMPATIBILITY TEST SPIN: CPT

## 2024-05-03 PROCEDURE — 2500000003 HC RX 250 WO HCPCS: Performed by: STUDENT IN AN ORGANIZED HEALTH CARE EDUCATION/TRAINING PROGRAM

## 2024-05-03 PROCEDURE — 86880 COOMBS TEST DIRECT: CPT

## 2024-05-03 PROCEDURE — 85025 COMPLETE CBC W/AUTO DIFF WBC: CPT

## 2024-05-03 PROCEDURE — 6360000002 HC RX W HCPCS: Performed by: NURSE PRACTITIONER

## 2024-05-03 PROCEDURE — 86921 COMPATIBILITY TEST INCUBATE: CPT

## 2024-05-03 PROCEDURE — 83735 ASSAY OF MAGNESIUM: CPT

## 2024-05-03 PROCEDURE — 6370000000 HC RX 637 (ALT 250 FOR IP): Performed by: PHYSICIAN ASSISTANT

## 2024-05-03 PROCEDURE — 86902 BLOOD TYPE ANTIGEN DONOR EA: CPT

## 2024-05-03 PROCEDURE — 80202 ASSAY OF VANCOMYCIN: CPT

## 2024-05-03 PROCEDURE — 2580000003 HC RX 258: Performed by: STUDENT IN AN ORGANIZED HEALTH CARE EDUCATION/TRAINING PROGRAM

## 2024-05-03 PROCEDURE — 2500000003 HC RX 250 WO HCPCS: Performed by: INTERNAL MEDICINE

## 2024-05-03 PROCEDURE — 2500000003 HC RX 250 WO HCPCS

## 2024-05-03 PROCEDURE — 6370000000 HC RX 637 (ALT 250 FOR IP): Performed by: NURSE PRACTITIONER

## 2024-05-03 RX ORDER — POTASSIUM CHLORIDE 7.45 MG/ML
10 INJECTION INTRAVENOUS
Status: COMPLETED | OUTPATIENT
Start: 2024-05-03 | End: 2024-05-04

## 2024-05-03 RX ORDER — SODIUM CHLORIDE 9 MG/ML
INJECTION, SOLUTION INTRAVENOUS PRN
Status: DISCONTINUED | OUTPATIENT
Start: 2024-05-03 | End: 2024-05-05

## 2024-05-03 RX ORDER — METOPROLOL TARTRATE 1 MG/ML
5 INJECTION, SOLUTION INTRAVENOUS ONCE
Status: COMPLETED | OUTPATIENT
Start: 2024-05-03 | End: 2024-05-03

## 2024-05-03 RX ORDER — SODIUM CHLORIDE 9 MG/ML
INJECTION, SOLUTION INTRAVENOUS PRN
Status: DISCONTINUED | OUTPATIENT
Start: 2024-05-03 | End: 2024-05-05 | Stop reason: SDUPTHER

## 2024-05-03 RX ORDER — DILTIAZEM HYDROCHLORIDE 100 MG/1
INJECTION, POWDER, LYOPHILIZED, FOR SOLUTION INTRAVENOUS
Status: COMPLETED
Start: 2024-05-03 | End: 2024-05-03

## 2024-05-03 RX ORDER — POTASSIUM CHLORIDE 750 MG/1
20 TABLET, FILM COATED, EXTENDED RELEASE ORAL DAILY
Status: DISCONTINUED | OUTPATIENT
Start: 2024-05-03 | End: 2024-05-06

## 2024-05-03 RX ADMIN — SENNOSIDES AND DOCUSATE SODIUM 1 TABLET: 50; 8.6 TABLET ORAL at 10:00

## 2024-05-03 RX ADMIN — SODIUM CHLORIDE, PRESERVATIVE FREE 10 ML: 5 INJECTION INTRAVENOUS at 10:00

## 2024-05-03 RX ADMIN — SODIUM CHLORIDE: 9 INJECTION, SOLUTION INTRAVENOUS at 13:47

## 2024-05-03 RX ADMIN — POTASSIUM CHLORIDE 10 MEQ: 7.46 INJECTION, SOLUTION INTRAVENOUS at 23:50

## 2024-05-03 RX ADMIN — METOPROLOL TARTRATE 5 MG: 5 INJECTION INTRAVENOUS at 20:02

## 2024-05-03 RX ADMIN — PIPERACILLIN AND TAZOBACTAM 3375 MG: 3; .375 INJECTION, POWDER, LYOPHILIZED, FOR SOLUTION INTRAVENOUS at 13:43

## 2024-05-03 RX ADMIN — DILTIAZEM HYDROCHLORIDE: 100 INJECTION, POWDER, LYOPHILIZED, FOR SOLUTION INTRAVENOUS at 22:12

## 2024-05-03 RX ADMIN — POTASSIUM CHLORIDE 20 MEQ: 750 TABLET, FILM COATED, EXTENDED RELEASE ORAL at 10:08

## 2024-05-03 RX ADMIN — METOPROLOL TARTRATE 12.5 MG: 25 TABLET, FILM COATED ORAL at 10:00

## 2024-05-03 RX ADMIN — PANTOPRAZOLE SODIUM 40 MG: 40 TABLET, DELAYED RELEASE ORAL at 17:01

## 2024-05-03 RX ADMIN — OCTREOTIDE ACETATE 50 MCG: 50 INJECTION, SOLUTION INTRAVENOUS; SUBCUTANEOUS at 05:33

## 2024-05-03 RX ADMIN — MIRTAZAPINE 15 MG: 15 TABLET, FILM COATED ORAL at 22:12

## 2024-05-03 RX ADMIN — OCTREOTIDE ACETATE 50 MCG: 50 INJECTION, SOLUTION INTRAVENOUS; SUBCUTANEOUS at 17:02

## 2024-05-03 RX ADMIN — SODIUM CHLORIDE 5 MG/HR: 900 INJECTION, SOLUTION INTRAVENOUS at 21:15

## 2024-05-03 RX ADMIN — PIPERACILLIN AND TAZOBACTAM 3375 MG: 3; .375 INJECTION, POWDER, LYOPHILIZED, FOR SOLUTION INTRAVENOUS at 00:04

## 2024-05-03 RX ADMIN — DIVALPROEX SODIUM 250 MG: 250 TABLET, DELAYED RELEASE ORAL at 10:00

## 2024-05-03 RX ADMIN — DIVALPROEX SODIUM 250 MG: 250 TABLET, DELAYED RELEASE ORAL at 22:12

## 2024-05-03 RX ADMIN — METOPROLOL TARTRATE 5 MG: 5 INJECTION INTRAVENOUS at 20:56

## 2024-05-03 RX ADMIN — SUCRALFATE 1 G: 1 TABLET ORAL at 10:00

## 2024-05-03 RX ADMIN — SUCRALFATE 1 G: 1 TABLET ORAL at 22:12

## 2024-05-03 RX ADMIN — POTASSIUM CHLORIDE 10 MEQ: 7.46 INJECTION, SOLUTION INTRAVENOUS at 22:19

## 2024-05-03 RX ADMIN — PIPERACILLIN AND TAZOBACTAM 3375 MG: 3; .375 INJECTION, POWDER, LYOPHILIZED, FOR SOLUTION INTRAVENOUS at 05:32

## 2024-05-03 RX ADMIN — SUCRALFATE 1 G: 1 TABLET ORAL at 17:02

## 2024-05-03 RX ADMIN — SODIUM CHLORIDE, PRESERVATIVE FREE 10 ML: 5 INJECTION INTRAVENOUS at 22:14

## 2024-05-03 RX ADMIN — DIAZEPAM 1 MG: 2 TABLET ORAL at 22:07

## 2024-05-03 ASSESSMENT — PAIN SCALES - GENERAL
PAINLEVEL_OUTOF10: 0

## 2024-05-04 LAB
ANION GAP SERPL CALC-SCNC: 6 MMOL/L (ref 5–15)
BASOPHILS # BLD: 0 K/UL (ref 0–0.1)
BASOPHILS NFR BLD: 0 % (ref 0–1)
BUN SERPL-MCNC: 23 MG/DL (ref 6–20)
BUN/CREAT SERPL: 26 (ref 12–20)
CALCIUM SERPL-MCNC: 8.1 MG/DL (ref 8.5–10.1)
CHLORIDE SERPL-SCNC: 110 MMOL/L (ref 97–108)
CO2 SERPL-SCNC: 28 MMOL/L (ref 21–32)
CREAT SERPL-MCNC: 0.87 MG/DL (ref 0.55–1.02)
DIFFERENTIAL METHOD BLD: ABNORMAL
EOSINOPHIL # BLD: 0.2 K/UL (ref 0–0.4)
EOSINOPHIL NFR BLD: 1 % (ref 0–7)
ERYTHROCYTE [DISTWIDTH] IN BLOOD BY AUTOMATED COUNT: 18.6 % (ref 11.5–14.5)
GLUCOSE BLD STRIP.AUTO-MCNC: 134 MG/DL (ref 65–117)
GLUCOSE SERPL-MCNC: 137 MG/DL (ref 65–100)
HCT VFR BLD AUTO: 21.7 % (ref 35–47)
HCT VFR BLD AUTO: 26.4 % (ref 35–47)
HGB BLD-MCNC: 6.7 G/DL (ref 11.5–16)
HGB BLD-MCNC: 8.3 G/DL (ref 11.5–16)
IMM GRANULOCYTES # BLD AUTO: 0 K/UL (ref 0–0.04)
IMM GRANULOCYTES NFR BLD AUTO: 0 % (ref 0–0.5)
LYMPHOCYTES # BLD: 1.4 K/UL (ref 0.8–3.5)
LYMPHOCYTES NFR BLD: 9 % (ref 12–49)
MCH RBC QN AUTO: 28.6 PG (ref 26–34)
MCHC RBC AUTO-ENTMCNC: 30.9 G/DL (ref 30–36.5)
MCV RBC AUTO: 92.7 FL (ref 80–99)
MONOCYTES # BLD: 1.1 K/UL (ref 0–1)
MONOCYTES NFR BLD: 7 % (ref 5–13)
NEUTS SEG # BLD: 12.8 K/UL (ref 1.8–8)
NEUTS SEG NFR BLD: 83 % (ref 32–75)
NRBC # BLD: 0.03 K/UL (ref 0–0.01)
NRBC BLD-RTO: 0.2 PER 100 WBC
PLATELET # BLD AUTO: 358 K/UL (ref 150–400)
PMV BLD AUTO: 10.7 FL (ref 8.9–12.9)
POTASSIUM SERPL-SCNC: 3.6 MMOL/L (ref 3.5–5.1)
RBC # BLD AUTO: 2.34 M/UL (ref 3.8–5.2)
RBC MORPH BLD: ABNORMAL
RBC MORPH BLD: ABNORMAL
SERVICE CMNT-IMP: ABNORMAL
SODIUM SERPL-SCNC: 144 MMOL/L (ref 136–145)
VANCOMYCIN SERPL-MCNC: 10.5 UG/ML
WBC # BLD AUTO: 15.5 K/UL (ref 3.6–11)

## 2024-05-04 PROCEDURE — 36415 COLL VENOUS BLD VENIPUNCTURE: CPT

## 2024-05-04 PROCEDURE — 6370000000 HC RX 637 (ALT 250 FOR IP): Performed by: NURSE PRACTITIONER

## 2024-05-04 PROCEDURE — 6360000002 HC RX W HCPCS: Performed by: HOSPITALIST

## 2024-05-04 PROCEDURE — 2060000000 HC ICU INTERMEDIATE R&B

## 2024-05-04 PROCEDURE — 85025 COMPLETE CBC W/AUTO DIFF WBC: CPT

## 2024-05-04 PROCEDURE — 6360000002 HC RX W HCPCS: Performed by: NURSE PRACTITIONER

## 2024-05-04 PROCEDURE — 85018 HEMOGLOBIN: CPT

## 2024-05-04 PROCEDURE — 6360000002 HC RX W HCPCS: Performed by: INTERNAL MEDICINE

## 2024-05-04 PROCEDURE — 82962 GLUCOSE BLOOD TEST: CPT

## 2024-05-04 PROCEDURE — P9016 RBC LEUKOCYTES REDUCED: HCPCS

## 2024-05-04 PROCEDURE — 80048 BASIC METABOLIC PNL TOTAL CA: CPT

## 2024-05-04 PROCEDURE — 2580000003 HC RX 258: Performed by: INTERNAL MEDICINE

## 2024-05-04 PROCEDURE — 80202 ASSAY OF VANCOMYCIN: CPT

## 2024-05-04 PROCEDURE — 6370000000 HC RX 637 (ALT 250 FOR IP): Performed by: INTERNAL MEDICINE

## 2024-05-04 PROCEDURE — 85014 HEMATOCRIT: CPT

## 2024-05-04 PROCEDURE — 6370000000 HC RX 637 (ALT 250 FOR IP): Performed by: PHYSICIAN ASSISTANT

## 2024-05-04 PROCEDURE — 36430 TRANSFUSION BLD/BLD COMPNT: CPT

## 2024-05-04 RX ADMIN — VANCOMYCIN HYDROCHLORIDE 750 MG: 750 INJECTION, POWDER, LYOPHILIZED, FOR SOLUTION INTRAVENOUS at 04:26

## 2024-05-04 RX ADMIN — METOPROLOL TARTRATE 12.5 MG: 25 TABLET, FILM COATED ORAL at 11:46

## 2024-05-04 RX ADMIN — SUCRALFATE 1 G: 1 TABLET ORAL at 21:43

## 2024-05-04 RX ADMIN — SODIUM CHLORIDE, PRESERVATIVE FREE 10 ML: 5 INJECTION INTRAVENOUS at 21:44

## 2024-05-04 RX ADMIN — PIPERACILLIN AND TAZOBACTAM 3375 MG: 3; .375 INJECTION, POWDER, LYOPHILIZED, FOR SOLUTION INTRAVENOUS at 14:20

## 2024-05-04 RX ADMIN — DIVALPROEX SODIUM 250 MG: 250 TABLET, DELAYED RELEASE ORAL at 21:41

## 2024-05-04 RX ADMIN — SUCRALFATE 1 G: 1 TABLET ORAL at 17:34

## 2024-05-04 RX ADMIN — VANCOMYCIN HYDROCHLORIDE 750 MG: 750 INJECTION, POWDER, LYOPHILIZED, FOR SOLUTION INTRAVENOUS at 22:06

## 2024-05-04 RX ADMIN — DIAZEPAM 1 MG: 2 TABLET ORAL at 21:44

## 2024-05-04 RX ADMIN — SUCRALFATE 1 G: 1 TABLET ORAL at 04:30

## 2024-05-04 RX ADMIN — SENNOSIDES AND DOCUSATE SODIUM 1 TABLET: 50; 8.6 TABLET ORAL at 11:46

## 2024-05-04 RX ADMIN — SODIUM CHLORIDE, PRESERVATIVE FREE 10 ML: 5 INJECTION INTRAVENOUS at 11:53

## 2024-05-04 RX ADMIN — SUCRALFATE 1 G: 1 TABLET ORAL at 11:46

## 2024-05-04 RX ADMIN — POTASSIUM CHLORIDE 20 MEQ: 750 TABLET, FILM COATED, EXTENDED RELEASE ORAL at 11:52

## 2024-05-04 RX ADMIN — METOPROLOL TARTRATE 12.5 MG: 25 TABLET, FILM COATED ORAL at 21:43

## 2024-05-04 RX ADMIN — DIVALPROEX SODIUM 250 MG: 250 TABLET, DELAYED RELEASE ORAL at 11:52

## 2024-05-04 RX ADMIN — PIPERACILLIN AND TAZOBACTAM 3375 MG: 3; .375 INJECTION, POWDER, LYOPHILIZED, FOR SOLUTION INTRAVENOUS at 05:29

## 2024-05-04 RX ADMIN — OCTREOTIDE ACETATE 50 MCG: 50 INJECTION, SOLUTION INTRAVENOUS; SUBCUTANEOUS at 17:34

## 2024-05-04 RX ADMIN — PIPERACILLIN AND TAZOBACTAM 3375 MG: 3; .375 INJECTION, POWDER, LYOPHILIZED, FOR SOLUTION INTRAVENOUS at 22:17

## 2024-05-04 RX ADMIN — POTASSIUM CHLORIDE 10 MEQ: 7.46 INJECTION, SOLUTION INTRAVENOUS at 00:50

## 2024-05-04 RX ADMIN — SODIUM CHLORIDE 25 ML: 9 INJECTION, SOLUTION INTRAVENOUS at 22:01

## 2024-05-04 RX ADMIN — SODIUM CHLORIDE: 9 INJECTION, SOLUTION INTRAVENOUS at 04:14

## 2024-05-04 RX ADMIN — OCTREOTIDE ACETATE 50 MCG: 50 INJECTION, SOLUTION INTRAVENOUS; SUBCUTANEOUS at 04:27

## 2024-05-04 RX ADMIN — PANTOPRAZOLE SODIUM 40 MG: 40 TABLET, DELAYED RELEASE ORAL at 16:17

## 2024-05-04 RX ADMIN — MIRTAZAPINE 15 MG: 15 TABLET, FILM COATED ORAL at 21:43

## 2024-05-04 RX ADMIN — PANTOPRAZOLE SODIUM 40 MG: 40 TABLET, DELAYED RELEASE ORAL at 04:30

## 2024-05-04 ASSESSMENT — PAIN SCALES - GENERAL: PAINLEVEL_OUTOF10: 0

## 2024-05-05 ENCOUNTER — APPOINTMENT (OUTPATIENT)
Facility: HOSPITAL | Age: 69
DRG: 853 | End: 2024-05-05
Payer: COMMERCIAL

## 2024-05-05 LAB
ANION GAP SERPL CALC-SCNC: 6 MMOL/L (ref 5–15)
BASE EXCESS BLD CALC-SCNC: 3.7 MMOL/L
BASOPHILS # BLD: 0 K/UL (ref 0–0.1)
BASOPHILS # BLD: 0 K/UL (ref 0–0.1)
BASOPHILS NFR BLD: 0 % (ref 0–1)
BASOPHILS NFR BLD: 0 % (ref 0–1)
BUN SERPL-MCNC: 12 MG/DL (ref 6–20)
BUN/CREAT SERPL: 14 (ref 12–20)
CALCIUM SERPL-MCNC: 8.9 MG/DL (ref 8.5–10.1)
CHLORIDE SERPL-SCNC: 111 MMOL/L (ref 97–108)
CO2 SERPL-SCNC: 26 MMOL/L (ref 21–32)
CREAT SERPL-MCNC: 0.84 MG/DL (ref 0.55–1.02)
DIFFERENTIAL METHOD BLD: ABNORMAL
DIFFERENTIAL METHOD BLD: ABNORMAL
ECHO BSA: 1.66 M2
EOSINOPHIL # BLD: 0 K/UL (ref 0–0.4)
EOSINOPHIL # BLD: 0.1 K/UL (ref 0–0.4)
EOSINOPHIL NFR BLD: 0 % (ref 0–7)
EOSINOPHIL NFR BLD: 1 % (ref 0–7)
ERYTHROCYTE [DISTWIDTH] IN BLOOD BY AUTOMATED COUNT: 18.6 % (ref 11.5–14.5)
ERYTHROCYTE [DISTWIDTH] IN BLOOD BY AUTOMATED COUNT: 18.6 % (ref 11.5–14.5)
GLUCOSE BLD STRIP.AUTO-MCNC: 154 MG/DL (ref 65–117)
GLUCOSE SERPL-MCNC: 159 MG/DL (ref 65–100)
HCO3 BLD-SCNC: 28.8 MMOL/L (ref 22–26)
HCT VFR BLD AUTO: 25.4 % (ref 35–47)
HCT VFR BLD AUTO: 28.2 % (ref 35–47)
HGB BLD-MCNC: 7.7 G/DL (ref 11.5–16)
HGB BLD-MCNC: 8.8 G/DL (ref 11.5–16)
IMM GRANULOCYTES # BLD AUTO: 0 K/UL
IMM GRANULOCYTES # BLD AUTO: 0 K/UL (ref 0–0.04)
IMM GRANULOCYTES NFR BLD AUTO: 0 %
IMM GRANULOCYTES NFR BLD AUTO: 0 % (ref 0–0.5)
LYMPHOCYTES # BLD: 1.4 K/UL (ref 0.8–3.5)
LYMPHOCYTES # BLD: 1.7 K/UL (ref 0.8–3.5)
LYMPHOCYTES NFR BLD: 12 % (ref 12–49)
LYMPHOCYTES NFR BLD: 9 % (ref 12–49)
MAGNESIUM SERPL-MCNC: 1.4 MG/DL (ref 1.6–2.4)
MAGNESIUM SERPL-MCNC: 1.7 MG/DL (ref 1.6–2.4)
MCH RBC QN AUTO: 28.2 PG (ref 26–34)
MCH RBC QN AUTO: 28.9 PG (ref 26–34)
MCHC RBC AUTO-ENTMCNC: 30.3 G/DL (ref 30–36.5)
MCHC RBC AUTO-ENTMCNC: 31.2 G/DL (ref 30–36.5)
MCV RBC AUTO: 92.5 FL (ref 80–99)
MCV RBC AUTO: 93 FL (ref 80–99)
METAMYELOCYTES NFR BLD MANUAL: 1 %
MONOCYTES # BLD: 0.8 K/UL (ref 0–1)
MONOCYTES # BLD: 1.4 K/UL (ref 0–1)
MONOCYTES NFR BLD: 6 % (ref 5–13)
MONOCYTES NFR BLD: 9 % (ref 5–13)
NEUTS BAND NFR BLD MANUAL: 2 %
NEUTS SEG # BLD: 11.1 K/UL (ref 1.8–8)
NEUTS SEG NFR BLD: 78 % (ref 32–75)
NEUTS SEG NFR BLD: 82 % (ref 32–75)
NRBC # BLD: 0.03 K/UL (ref 0–0.01)
NRBC # BLD: 0.04 K/UL (ref 0–0.01)
NRBC BLD-RTO: 0.2 PER 100 WBC
NRBC BLD-RTO: 0.3 PER 100 WBC
O2/TOTAL GAS SETTING VFR VENT: 2 %
PCO2 BLD: 44.9 MMHG (ref 35–45)
PH BLD: 7.42 (ref 7.35–7.45)
PLATELET # BLD AUTO: 321 K/UL (ref 150–400)
PLATELET # BLD AUTO: 372 K/UL (ref 150–400)
PLATELET COMMENT: ABNORMAL
PMV BLD AUTO: 10.5 FL (ref 8.9–12.9)
PMV BLD AUTO: 10.5 FL (ref 8.9–12.9)
PO2 BLD: 70 MMHG (ref 80–100)
POTASSIUM SERPL-SCNC: 3.1 MMOL/L (ref 3.5–5.1)
RBC # BLD AUTO: 2.73 M/UL (ref 3.8–5.2)
RBC # BLD AUTO: 3.05 M/UL (ref 3.8–5.2)
RBC MORPH BLD: ABNORMAL
RBC MORPH BLD: ABNORMAL
SAO2 % BLD: 93.8 % (ref 92–97)
SERVICE CMNT-IMP: ABNORMAL
SODIUM SERPL-SCNC: 143 MMOL/L (ref 136–145)
SPECIMEN TYPE: ABNORMAL
WBC # BLD AUTO: 13.9 K/UL (ref 3.6–11)
WBC # BLD AUTO: 15.8 K/UL (ref 3.6–11)

## 2024-05-05 PROCEDURE — 93970 EXTREMITY STUDY: CPT

## 2024-05-05 PROCEDURE — 6370000000 HC RX 637 (ALT 250 FOR IP): Performed by: INTERNAL MEDICINE

## 2024-05-05 PROCEDURE — 6360000002 HC RX W HCPCS: Performed by: INTERNAL MEDICINE

## 2024-05-05 PROCEDURE — 6370000000 HC RX 637 (ALT 250 FOR IP): Performed by: NURSE PRACTITIONER

## 2024-05-05 PROCEDURE — 85025 COMPLETE CBC W/AUTO DIFF WBC: CPT

## 2024-05-05 PROCEDURE — 80048 BASIC METABOLIC PNL TOTAL CA: CPT

## 2024-05-05 PROCEDURE — 83735 ASSAY OF MAGNESIUM: CPT

## 2024-05-05 PROCEDURE — 2500000003 HC RX 250 WO HCPCS: Performed by: STUDENT IN AN ORGANIZED HEALTH CARE EDUCATION/TRAINING PROGRAM

## 2024-05-05 PROCEDURE — 6370000000 HC RX 637 (ALT 250 FOR IP): Performed by: PHYSICIAN ASSISTANT

## 2024-05-05 PROCEDURE — 82803 BLOOD GASES ANY COMBINATION: CPT

## 2024-05-05 PROCEDURE — 93005 ELECTROCARDIOGRAM TRACING: CPT | Performed by: INTERNAL MEDICINE

## 2024-05-05 PROCEDURE — 2700000000 HC OXYGEN THERAPY PER DAY

## 2024-05-05 PROCEDURE — 2580000003 HC RX 258: Performed by: STUDENT IN AN ORGANIZED HEALTH CARE EDUCATION/TRAINING PROGRAM

## 2024-05-05 PROCEDURE — 87040 BLOOD CULTURE FOR BACTERIA: CPT

## 2024-05-05 PROCEDURE — 2060000000 HC ICU INTERMEDIATE R&B

## 2024-05-05 PROCEDURE — 2580000003 HC RX 258: Performed by: INTERNAL MEDICINE

## 2024-05-05 PROCEDURE — 36415 COLL VENOUS BLD VENIPUNCTURE: CPT

## 2024-05-05 PROCEDURE — 6360000002 HC RX W HCPCS: Performed by: NURSE PRACTITIONER

## 2024-05-05 PROCEDURE — 71045 X-RAY EXAM CHEST 1 VIEW: CPT

## 2024-05-05 PROCEDURE — 82962 GLUCOSE BLOOD TEST: CPT

## 2024-05-05 PROCEDURE — 2500000003 HC RX 250 WO HCPCS: Performed by: NURSE PRACTITIONER

## 2024-05-05 PROCEDURE — 36600 WITHDRAWAL OF ARTERIAL BLOOD: CPT

## 2024-05-05 RX ORDER — DILTIAZEM HYDROCHLORIDE 5 MG/ML
10 INJECTION INTRAVENOUS ONCE
Status: COMPLETED | OUTPATIENT
Start: 2024-05-05 | End: 2024-05-05

## 2024-05-05 RX ORDER — MAGNESIUM SULFATE 1 G/100ML
1000 INJECTION INTRAVENOUS ONCE
Status: COMPLETED | OUTPATIENT
Start: 2024-05-05 | End: 2024-05-05

## 2024-05-05 RX ORDER — LEVALBUTEROL INHALATION SOLUTION 1.25 MG/3ML
1.25 SOLUTION RESPIRATORY (INHALATION) EVERY 4 HOURS PRN
Status: DISCONTINUED | OUTPATIENT
Start: 2024-05-05 | End: 2024-05-07 | Stop reason: HOSPADM

## 2024-05-05 RX ORDER — FUROSEMIDE 10 MG/ML
20 INJECTION INTRAMUSCULAR; INTRAVENOUS ONCE
Status: COMPLETED | OUTPATIENT
Start: 2024-05-05 | End: 2024-05-05

## 2024-05-05 RX ORDER — POTASSIUM CHLORIDE 7.45 MG/ML
10 INJECTION INTRAVENOUS ONCE
Status: COMPLETED | OUTPATIENT
Start: 2024-05-05 | End: 2024-05-05

## 2024-05-05 RX ORDER — LORAZEPAM 2 MG/ML
0.5 INJECTION INTRAMUSCULAR ONCE
Status: COMPLETED | OUTPATIENT
Start: 2024-05-05 | End: 2024-05-05

## 2024-05-05 RX ORDER — GUAIFENESIN 600 MG/1
600 TABLET, EXTENDED RELEASE ORAL 2 TIMES DAILY
Status: DISCONTINUED | OUTPATIENT
Start: 2024-05-05 | End: 2024-05-07 | Stop reason: HOSPADM

## 2024-05-05 RX ADMIN — DIAZEPAM 1 MG: 2 TABLET ORAL at 22:11

## 2024-05-05 RX ADMIN — SENNOSIDES AND DOCUSATE SODIUM 1 TABLET: 50; 8.6 TABLET ORAL at 09:51

## 2024-05-05 RX ADMIN — SUCRALFATE 1 G: 1 TABLET ORAL at 16:14

## 2024-05-05 RX ADMIN — GUAIFENESIN 600 MG: 600 TABLET, EXTENDED RELEASE ORAL at 22:03

## 2024-05-05 RX ADMIN — METOPROLOL TARTRATE 25 MG: 25 TABLET, FILM COATED ORAL at 22:03

## 2024-05-05 RX ADMIN — DIVALPROEX SODIUM 250 MG: 250 TABLET, DELAYED RELEASE ORAL at 22:03

## 2024-05-05 RX ADMIN — POTASSIUM CHLORIDE 10 MEQ: 7.46 INJECTION, SOLUTION INTRAVENOUS at 03:03

## 2024-05-05 RX ADMIN — ACETAMINOPHEN 650 MG: 325 TABLET ORAL at 16:14

## 2024-05-05 RX ADMIN — DIVALPROEX SODIUM 250 MG: 250 TABLET, DELAYED RELEASE ORAL at 09:50

## 2024-05-05 RX ADMIN — SODIUM CHLORIDE, PRESERVATIVE FREE 10 ML: 5 INJECTION INTRAVENOUS at 09:53

## 2024-05-05 RX ADMIN — LORAZEPAM 0.5 MG: 2 INJECTION, SOLUTION INTRAMUSCULAR; INTRAVENOUS at 01:39

## 2024-05-05 RX ADMIN — MIRTAZAPINE 15 MG: 15 TABLET, FILM COATED ORAL at 22:11

## 2024-05-05 RX ADMIN — PIPERACILLIN AND TAZOBACTAM 3375 MG: 3; .375 INJECTION, POWDER, LYOPHILIZED, FOR SOLUTION INTRAVENOUS at 05:37

## 2024-05-05 RX ADMIN — METOPROLOL TARTRATE 12.5 MG: 25 TABLET, FILM COATED ORAL at 09:51

## 2024-05-05 RX ADMIN — FUROSEMIDE 20 MG: 20 TABLET ORAL at 09:50

## 2024-05-05 RX ADMIN — SODIUM CHLORIDE 7.5 MG/HR: 900 INJECTION, SOLUTION INTRAVENOUS at 09:46

## 2024-05-05 RX ADMIN — DILTIAZEM HYDROCHLORIDE 10 MG: 5 INJECTION, SOLUTION INTRAVENOUS at 00:37

## 2024-05-05 RX ADMIN — POTASSIUM CHLORIDE 20 MEQ: 750 TABLET, FILM COATED, EXTENDED RELEASE ORAL at 09:52

## 2024-05-05 RX ADMIN — GUAIFENESIN 600 MG: 600 TABLET, EXTENDED RELEASE ORAL at 09:51

## 2024-05-05 RX ADMIN — SODIUM CHLORIDE, PRESERVATIVE FREE 10 ML: 5 INJECTION INTRAVENOUS at 22:10

## 2024-05-05 RX ADMIN — OCTREOTIDE ACETATE 50 MCG: 50 INJECTION, SOLUTION INTRAVENOUS; SUBCUTANEOUS at 16:15

## 2024-05-05 RX ADMIN — SODIUM CHLORIDE 5 MG/HR: 900 INJECTION, SOLUTION INTRAVENOUS at 00:42

## 2024-05-05 RX ADMIN — SUCRALFATE 1 G: 1 TABLET ORAL at 22:03

## 2024-05-05 RX ADMIN — VANCOMYCIN HYDROCHLORIDE 1000 MG: 1 INJECTION, POWDER, LYOPHILIZED, FOR SOLUTION INTRAVENOUS at 16:41

## 2024-05-05 RX ADMIN — PIPERACILLIN AND TAZOBACTAM 3375 MG: 3; .375 INJECTION, POWDER, LYOPHILIZED, FOR SOLUTION INTRAVENOUS at 13:05

## 2024-05-05 RX ADMIN — PANTOPRAZOLE SODIUM 40 MG: 40 TABLET, DELAYED RELEASE ORAL at 16:15

## 2024-05-05 RX ADMIN — FUROSEMIDE 20 MG: 10 INJECTION, SOLUTION INTRAMUSCULAR; INTRAVENOUS at 00:51

## 2024-05-05 RX ADMIN — PIPERACILLIN AND TAZOBACTAM 3375 MG: 3; .375 INJECTION, POWDER, LYOPHILIZED, FOR SOLUTION INTRAVENOUS at 22:02

## 2024-05-05 RX ADMIN — SODIUM CHLORIDE 20 ML: 9 INJECTION, SOLUTION INTRAVENOUS at 22:01

## 2024-05-05 RX ADMIN — OCTREOTIDE ACETATE 50 MCG: 50 INJECTION, SOLUTION INTRAVENOUS; SUBCUTANEOUS at 05:40

## 2024-05-05 RX ADMIN — MAGNESIUM SULFATE HEPTAHYDRATE 1000 MG: 1 INJECTION, SOLUTION INTRAVENOUS at 02:35

## 2024-05-05 RX ADMIN — ACETAMINOPHEN 650 MG: 650 SUPPOSITORY RECTAL at 01:12

## 2024-05-05 RX ADMIN — FUROSEMIDE 20 MG: 20 TABLET ORAL at 22:16

## 2024-05-05 RX ADMIN — SUCRALFATE 1 G: 1 TABLET ORAL at 09:51

## 2024-05-05 ASSESSMENT — PAIN SCALES - GENERAL: PAINLEVEL_OUTOF10: 3

## 2024-05-05 NOTE — CONSULTS
Gastroenterology Consult  (CHANTELLE Ramírez for Dr. Diaz)     Referring Physician: Dr. Jason Christian    Consult Date: 4/8/2024     Subjective:     Chief Complaint: AMS    History of Present Illness: Lillie Claire is a 68 y.o. female who is seen in consultation for +FOBT. Patient presented from home with AMS and admitted 4/2/24 with sepsis secondary to pneumonia.  Diagnosed with lung mass.  Is currently on 4L oxygen, down from 6-7L and O2 sats are 92%.  Does not wear oxygen at home.  We are consulted for +FOBT.  Pt's hgb dropped to 6.8 2 days ago prompting a blood transfusion.  Hgb improved to 9.6 but today dropped back to 7.5.  Iron is 8 and iron sat is 4%.  Patient's stools are dark per nursing.  History from patient is limited.  She lives with her brother.  History from him is also limited.  She has been under the care of [facility] for the past 7 years.  He is unaware of a previous colonoscopy.  Review of prior outside records show an EGD in 2018 that was negative.  He doesn't think she was using any NSAIDs.  She has had a poor appetite for years and he thinks she may have lost some weight. Their mother had throat cancer.  Patient has hx of bipolar and \"short term memory issues\". Chart mentions cognitive impairment from previous CVA.      Past Medical History:   Diagnosis Date    Anxiety     Bipolar 1 disorder (HCC)     CVA (cerebral vascular accident) (HCC)     Hypertension      History reviewed. No pertinent surgical history.   No family history on file.  Social History     Tobacco Use    Smoking status: Every Day    Smokeless tobacco: Not on file   Substance Use Topics    Alcohol use: Yes      No Known Allergies  Current Facility-Administered Medications   Medication Dose Route Frequency    0.9 % sodium chloride infusion   IntraVENous PRN    0.9 % sodium chloride infusion   IntraVENous PRN    [Held by provider] enoxaparin (LOVENOX) injection 40 mg  40 mg SubCUTAneous Daily    piperacillin-tazobactam (ZOSYN) 
  [facility]  [address], [address]  [place] [address] [phone]           Oncology Consult Note      68/F with metastatic NSCLC. Admitted with respiratory failure. LN biopsy of the supraclavicular LN revealed poorly differentiated carcinoma,   Full consult note to follow.       Signed by: Jamshid Waller MD                     April 20, 2024    
Comprehensive Nutrition Assessment    Type and Reason for Visit:  Consult, Reassess    Nutrition Recommendations/Plan:   Contineu current ETC diet, liberalizing off of therapeutic diet restrictions   Continue ONS: Ensure Clear at B, Magic Cup at L, Ensure plus HP at D    Continue PO/nutrition support only if within goals of care. Honor food/meal preferences, even if those preferences are for no food or fluids. Per Hospice/comfort care philosophy, nutrition goals are to improve quality of life and symptom relief.      Malnutrition Assessment:  Malnutrition Status:  Severe malnutrition (05/02/24 0906)    Context:  Acute Illness     Findings of the 6 clinical characteristics of malnutrition:  Energy Intake:  50% or less of estimated energy requirements for 5 or more days  Weight Loss:  Greater than 5% over 1 month     Body Fat Loss:  Unable to assess     Muscle Mass Loss:  Unable to assess    Fluid Accumulation:  No significant fluid accumulation     Strength:  Not Performed    Nutrition Assessment:     Nutrition previously following for poor PO, Signed off 4/26 after plans for d/c to hospice confirmed. RD now re-consulted for poor PO. Previous notes reviewed, noted pt with poor intakes since admit of foods provided by the hospital, foods brought in by family, and multiple types of ONS. Aggressive nutrition interventions (I.e. tube feeding) inappropriate considering ultimate goal of hospice. Discussed with MD to remove therapeutic diet restrictions. Also called Pt brother, Lorne Claire, to discuss nutrition goals of care and review hospice philosophy of improving quality of days. RD obtained food preferences, encouraged PO as tolerated and desired. Brother asked to continue ONS at this time.     Nutrition Related Findings:    Labs: K+ 3.1, BUN 44, C 1.42, GFR 40, H/H 6.5/21.3.   Meds: valium, depakote, fluconazole, remeron, octreotide, PPI, senna-docusate daily, carafate.  Edema:  None                  LBM:  
Consult received,detail note to follow.  
Nephrology Consult Note     [facility]                Phone - [phone]   Patient: Lillie Claire   YOB: 1955    Date- 4/3/2024  MRN: [id number]             CONSULTING PHYSICIAN: Dr. Mcpherson  REASON FOR CONSULTATION: Hyponatremia  ADMIT DATE:4/2/2024 PATIENT PCP:Husam Foster, APRN - NP     IMPRESSION & PLAN:   Hyponatremia(suspect secondary to IVVD, poor p.o. intake)  Sepsis  Pneumonia  Right lower lobe lung mass/pneumonia  Altered mental state  Hypotension    PLAN-  -agree with IV normal saline at 75 mill per hour.  -Status post 2.5 L fluid boluses.  -Check BMP every 6 hours.  -Ordered urine sodium, serum uric acid.  -Encourage p.o. intake.  -IV antibiotics per primary team.  -Thank you for the consult.       Principal Problem:    Sepsis (HCC)  Resolved Problems:    * No resolved hospital problems. *      [] High complexity decision making was performed  [] Patient is at high-risk of decompensation with multiple organ involvement    Subjective:   HPI: Lillie Claire is a 68 y.o. female who has past medical history significant for bipolar disorder, CVA, hypertension who presents to the ED for altered mental state.  Most history is obtained from medical records as patient is a very poor historian.  Patient has been confused for a week and has been having recurrent falls and tremors.  Initial workup in ED showed her to have hyponatremia of 124 with elevated lactate of 3.29.  She had a CT chest/abdomen pelvis done that showed 5*4 cm partial consolidation of superior segment of right lower lobe.  She also has  right paratracheal and right hilar lymphadenopathy.  Chief Complaint   Patient presents with    Altered Mental Status     BIBEMS from home for AMS for 1 week. Per EMS family sts patient has had tremors and has been altered. Also reports increased falls.    .        Review of Systems:   Unable to obtain    Past Medical History:   Diagnosis Date    Anxiety     
murmurs/rubs/gallops    Lungs:  CTA b/l, no rhonchi/crackles/wheeze, diminished BS at bases    ABD: Soft/NT, non rigid mildly distended    EXT: No cyanosis/clubbing/edema, normal peripheral pulses    Skin: No rashes or ulcers, no mottling    Neuro: A/O x 3        Medications:  Current Facility-Administered Medications   Medication Dose Route Frequency    sodium chloride flush 0.9 % injection 5-40 mL  5-40 mL IntraVENous 2 times per day    sodium chloride flush 0.9 % injection 5-40 mL  5-40 mL IntraVENous PRN    0.9 % sodium chloride infusion   IntraVENous PRN    enoxaparin (LOVENOX) injection 40 mg  40 mg SubCUTAneous Daily    0.9 % sodium chloride infusion   IntraVENous Continuous    piperacillin-tazobactam (ZOSYN) 3,375 mg in sodium chloride 0.9 % 50 mL IVPB (mini-bag)  3,375 mg IntraVENous Q8H    vancomycin (VANCOCIN) 750 mg in sodium chloride 0.9 % 250 mL IVPB ([id number])  750 mg IntraVENous Q12H    [START ON 4/4/2024] Vancomycin random level with AM labs (Thursday, 4/4)  1 each Other Once    diazePAM (VALIUM) tablet 1 mg  1 mg Oral Nightly    divalproex (DEPAKOTE) DR tablet 250 mg  250 mg Oral BID    mirtazapine (REMERON) tablet 15 mg  15 mg Oral Nightly    [START ON 4/4/2024] atorvastatin (LIPITOR) tablet 20 mg  20 mg Oral Daily    [START ON 4/4/2024] aspirin EC tablet 81 mg  81 mg Oral Daily    senna-docusate (PERICOLACE) 8.6-50 MG per tablet 1 tablet  1 tablet Oral Daily    dextromethorphan-quiNIDine (NUEDEXTA) 20-10 MG per capsule 1 capsule  1 capsule Oral BID    sodium chloride flush 0.9 % injection 5-40 mL  5-40 mL IntraVENous 2 times per day    sodium chloride flush 0.9 % injection 5-40 mL  5-40 mL IntraVENous PRN    0.9 % sodium chloride infusion   IntraVENous PRN    ondansetron (ZOFRAN-ODT) disintegrating tablet 4 mg  4 mg Oral Q8H PRN    Or    ondansetron (ZOFRAN) injection 4 mg  4 mg IntraVENous Q6H PRN    polyethylene glycol (GLYCOLAX) packet 17 g  17 g Oral Daily PRN    acetaminophen (TYLENOL) 
per day    sodium chloride flush 0.9 % injection 5-40 mL  5-40 mL IntraVENous PRN    0.9 % sodium chloride infusion   IntraVENous PRN    ondansetron (ZOFRAN-ODT) disintegrating tablet 4 mg  4 mg Oral Q8H PRN    Or    ondansetron (ZOFRAN) injection 4 mg  4 mg IntraVENous Q6H PRN    polyethylene glycol (GLYCOLAX) packet 17 g  17 g Oral Daily PRN    acetaminophen (TYLENOL) tablet 650 mg  650 mg Oral Q6H PRN    Or    acetaminophen (TYLENOL) suppository 650 mg  650 mg Rectal Q6H PRN        Cardiovascular ROS: Not able to obtain history directly from the patient.  Chart review suggests she was admitted with altered mental status with febrile illness.  She was found to be severely anemic.  Received 3 transfusions so far.        Objective:      Vitals:    05/05/24 1130   BP: (!) 89/61   Pulse: 67   Resp: 11   Temp: 97.9 °F (36.6 °C)   SpO2:        Physical Exam   She is drowsy easily arousable..  Not able to answer questions however when I asked her to get up for lung examination she was able to sit up.  Heart exam: S1-S2 regular with a low-grade murmur.  Lungs: Coarse breath sounds.  Abdomen: Soft  Extremities: No pedal edema  Data Review:   Recent Labs     05/05/24  0038 05/05/24  0650   WBC 15.8* 13.9*   HGB 8.8* 7.7*   HCT 28.2* 25.4*    321     Recent Labs     05/03/24  2053 05/04/24  0318 05/05/24  0038 05/05/24  0650    144 143  --    K 2.9* 3.6 3.1*  --     110* 111*  --    CO2 29 28 26  --    BUN 27* 23* 12  --    CREATININE 1.01 0.87 0.84  --    MG 1.7  --  1.4* 1.7   PHOS 2.2*  --   --   --        No results for input(s): \"TROPHS\", \"NTPROBNP\" in the last 72 hours.      Intake/Output Summary (Last 24 hours) at 5/5/2024 1157  Last data filed at 5/5/2024 0658  Gross per 24 hour   Intake 120 ml   Output 650 ml   Net -530 ml        Cardiographics    Telemetry: Currently she is in sinus at 69 bpm  ECG: With rapid ventricular response on May 1, 2024 at 148 bpm last night A-fib with rapid ventricular 
MDM  [x] The total encounter time on this service date was __60__ minutes which was spent performing a face-to-face encounter and personally completing the provider-level activities documented in the note. This includes time spent prior to the visit and after the visit in direct care of the patient. This time does not include time spent in any separately reportable services.    Electronically signed by   Nini Fonseca MD  Palliative Care Team  on 4/4/2024 at 4:51 PM      
Culture, Blood 2 [[id number]]     Order Status: Sent Specimen: Blood     Culture, Blood 1 [[id number]] Collected: 04/11/24 0551    Order Status: Completed Specimen: Blood Updated: 04/11/24 0724     Special Requests NO SPECIAL REQUESTS        Culture NO GROWTH AFTER 1 HOUR       Culture, Blood 1 [[id number]] Collected: 04/10/24 1844    Order Status: Completed Specimen: Blood Updated: 04/11/24 0724     Special Requests --        NO SPECIAL REQUESTS  LEFT  Antecubital       Culture NO GROWTH AFTER 12 HOURS       Culture, Blood 2 [[id number]]     Order Status: Sent Specimen: Blood     Culture, Respiratory [[id number]]     Order Status: Sent Specimen: Sputum Expectorated     Occult Blood Stool Immunoassay [[id number]]     Order Status: Canceled Specimen: Stool     Urine Culture Hold Sample [[id number]] Collected: 04/04/24 0806    Order Status: Completed Specimen: Urine Updated: 04/04/24 0833     Specimen HOld       Urine on hold in Microbiology dept for 2 days.  If unpreserved urine is submitted, it cannot be used for addtional testing after 24 hours, recollection will be required.          Clostridium Difficile Toxin/Antigen [[id number]]     Order Status: Canceled Specimen: Stool     Respiratory Panel, Molecular, with COVID-19 (Restricted: peds pts or suitable admitted adults) [[id number]] Collected: 04/03/24 1519    Order Status: Completed Specimen: Nasopharyngeal Updated: 04/03/24 1917     Adenovirus by PCR Not detected        Coronavirus 229E by PCR Not detected        Coronavirus HKU1 by PCR Not detected        Coronavirus NL63 by PCR Not detected        Coronavirus OC43 by PCR Not detected        SARS-CoV-2, PCR Not detected        Human Metapneumovirus by PCR Not detected        Rhinovirus Enterovirus PCR Not detected        Influenza A by PCR Not detected        Influenza B PCR Not detected        Parainfluenza 1 PCR Not detected        Parainfluenza 2 PCR Not detected        Parainfluenza 3 PCR Not detected

## 2024-05-05 NOTE — SIGNIFICANT EVENT
1900: Received patient from [facility] rapid response ISAAC Palomino RN. Patient remains tachycardic in the 140s to 150s.    1930: Assisted anesthesia with central line placement.    1956: Metoprolol 5mg IVP administered.    2008: Patient's HR down to 85, NSR.    2027: PRN Tylenol given for temp of 101.4.    Patient to remain in room at this time.    Please call with any questions or concerns  Sandi Schneider RN  RRT [id number]  
Significant Event Note    Responded to overhead rapid response call. Called for new onset atrial fibrillation with RVR.     S:  Patient with no complaints currently - states feeling at baseline.     O:  /76. HR 130s. Sats normal on room air.   Exam notable for normal neurological status. Heart exam notable for IRIR, tachycardia. Lung exam normal.     A/P:  69yo admitted for fever, sepsis, pneumonia. Also with acute on chronic anemia, hypokalemia, EMILY. RRT called overnight 5/3 for new onset AF with RVR.   - Reviewed labs - 1u RBC ordered earlier today however was not given - unclear why. RN spoke with blood bank and blood started now.   - Given 1x dose IV metop - no significant improvement in HR so will start IV dilt gtt   - Will check electrolytes, replete accordingly   - Appears had antibodies which required blood from [place] - will order second unit prepared if needed to expedite     ADDENDUM: Rechecked patient several hours later - rhythm now NSR with rates in 80s. Dilt titrated down to 2.5 and then off if rhythm still stable. Suspect AF RVR provoked by severe anemia, electrolyte disarray.         Bob Goldman MD  5/4/2024  5:10 AM      
condition. This care involved high complexity decision making to assess, manipulate, and support vital system functions, to treat this degree of vital organ system failure, and to prevent further life threatening deterioration of the patient’s condition.    Signed: VALERIE Wang CNP  [facility] [place] [facility] [place]ist/Nocturnist  5/5/2024 12:26 AM    Please do not hesitate to reach out to myself or assigned provider on-call via [employer] paging system with questions or concerns.

## 2024-05-06 LAB
ANION GAP SERPL CALC-SCNC: 5 MMOL/L (ref 5–15)
BUN SERPL-MCNC: 10 MG/DL (ref 6–20)
BUN/CREAT SERPL: 14 (ref 12–20)
CALCIUM SERPL-MCNC: 8.9 MG/DL (ref 8.5–10.1)
CHLORIDE SERPL-SCNC: 105 MMOL/L (ref 97–108)
CO2 SERPL-SCNC: 30 MMOL/L (ref 21–32)
CREAT SERPL-MCNC: 0.74 MG/DL (ref 0.55–1.02)
EKG DIAGNOSIS: NORMAL
EKG Q-T INTERVAL: 328 MS
EKG QRS DURATION: 70 MS
EKG QTC CALCULATION (BAZETT): 511 MS
EKG R AXIS: 8 DEGREES
EKG T AXIS: 31 DEGREES
EKG VENTRICULAR RATE: 146 BPM
GLUCOSE SERPL-MCNC: 137 MG/DL (ref 65–100)
POTASSIUM SERPL-SCNC: 2.6 MMOL/L (ref 3.5–5.1)
PROCALCITONIN SERPL-MCNC: 0.29 NG/ML
SODIUM SERPL-SCNC: 140 MMOL/L (ref 136–145)

## 2024-05-06 PROCEDURE — 6370000000 HC RX 637 (ALT 250 FOR IP): Performed by: NURSE PRACTITIONER

## 2024-05-06 PROCEDURE — 6360000002 HC RX W HCPCS: Performed by: NURSE PRACTITIONER

## 2024-05-06 PROCEDURE — 84145 PROCALCITONIN (PCT): CPT

## 2024-05-06 PROCEDURE — 6360000002 HC RX W HCPCS: Performed by: INTERNAL MEDICINE

## 2024-05-06 PROCEDURE — 2700000000 HC OXYGEN THERAPY PER DAY

## 2024-05-06 PROCEDURE — 80048 BASIC METABOLIC PNL TOTAL CA: CPT

## 2024-05-06 PROCEDURE — 6370000000 HC RX 637 (ALT 250 FOR IP): Performed by: INTERNAL MEDICINE

## 2024-05-06 PROCEDURE — 2060000000 HC ICU INTERMEDIATE R&B

## 2024-05-06 PROCEDURE — 36415 COLL VENOUS BLD VENIPUNCTURE: CPT

## 2024-05-06 PROCEDURE — 6370000000 HC RX 637 (ALT 250 FOR IP): Performed by: PHYSICIAN ASSISTANT

## 2024-05-06 PROCEDURE — 2580000003 HC RX 258: Performed by: INTERNAL MEDICINE

## 2024-05-06 RX ORDER — POTASSIUM CHLORIDE 750 MG/1
40 TABLET, FILM COATED, EXTENDED RELEASE ORAL DAILY
Status: DISCONTINUED | OUTPATIENT
Start: 2024-05-07 | End: 2024-05-07 | Stop reason: HOSPADM

## 2024-05-06 RX ORDER — POTASSIUM CHLORIDE 7.45 MG/ML
10 INJECTION INTRAVENOUS
Status: COMPLETED | OUTPATIENT
Start: 2024-05-06 | End: 2024-05-06

## 2024-05-06 RX ADMIN — PANTOPRAZOLE SODIUM 40 MG: 40 TABLET, DELAYED RELEASE ORAL at 06:43

## 2024-05-06 RX ADMIN — METOPROLOL TARTRATE 25 MG: 25 TABLET, FILM COATED ORAL at 21:25

## 2024-05-06 RX ADMIN — GUAIFENESIN 600 MG: 600 TABLET, EXTENDED RELEASE ORAL at 08:04

## 2024-05-06 RX ADMIN — SUCRALFATE 1 G: 1 TABLET ORAL at 17:36

## 2024-05-06 RX ADMIN — SODIUM CHLORIDE, PRESERVATIVE FREE 10 ML: 5 INJECTION INTRAVENOUS at 21:25

## 2024-05-06 RX ADMIN — FUROSEMIDE 20 MG: 20 TABLET ORAL at 08:04

## 2024-05-06 RX ADMIN — PIPERACILLIN AND TAZOBACTAM 3375 MG: 3; .375 INJECTION, POWDER, LYOPHILIZED, FOR SOLUTION INTRAVENOUS at 04:42

## 2024-05-06 RX ADMIN — METOPROLOL TARTRATE 25 MG: 25 TABLET, FILM COATED ORAL at 08:05

## 2024-05-06 RX ADMIN — OCTREOTIDE ACETATE 50 MCG: 50 INJECTION, SOLUTION INTRAVENOUS; SUBCUTANEOUS at 04:38

## 2024-05-06 RX ADMIN — POTASSIUM CHLORIDE 10 MEQ: 10 INJECTION, SOLUTION INTRAVENOUS at 09:54

## 2024-05-06 RX ADMIN — SUCRALFATE 1 G: 1 TABLET ORAL at 06:42

## 2024-05-06 RX ADMIN — SUCRALFATE 1 G: 1 TABLET ORAL at 10:03

## 2024-05-06 RX ADMIN — POTASSIUM BICARBONATE 40 MEQ: 782 TABLET, EFFERVESCENT ORAL at 09:02

## 2024-05-06 RX ADMIN — POTASSIUM CHLORIDE 10 MEQ: 10 INJECTION, SOLUTION INTRAVENOUS at 12:27

## 2024-05-06 RX ADMIN — SODIUM CHLORIDE 20 ML: 9 INJECTION, SOLUTION INTRAVENOUS at 04:41

## 2024-05-06 RX ADMIN — SENNOSIDES AND DOCUSATE SODIUM 1 TABLET: 50; 8.6 TABLET ORAL at 08:05

## 2024-05-06 RX ADMIN — DIAZEPAM 1 MG: 2 TABLET ORAL at 21:25

## 2024-05-06 RX ADMIN — DIVALPROEX SODIUM 250 MG: 250 TABLET, DELAYED RELEASE ORAL at 08:04

## 2024-05-06 RX ADMIN — PIPERACILLIN AND TAZOBACTAM 3375 MG: 3; .375 INJECTION, POWDER, LYOPHILIZED, FOR SOLUTION INTRAVENOUS at 21:32

## 2024-05-06 RX ADMIN — GUAIFENESIN 600 MG: 600 TABLET, EXTENDED RELEASE ORAL at 21:24

## 2024-05-06 RX ADMIN — FUROSEMIDE 20 MG: 20 TABLET ORAL at 17:36

## 2024-05-06 RX ADMIN — DIVALPROEX SODIUM 250 MG: 250 TABLET, DELAYED RELEASE ORAL at 21:25

## 2024-05-06 RX ADMIN — VANCOMYCIN HYDROCHLORIDE 1000 MG: 1 INJECTION, POWDER, LYOPHILIZED, FOR SOLUTION INTRAVENOUS at 10:02

## 2024-05-06 RX ADMIN — MIRTAZAPINE 15 MG: 15 TABLET, FILM COATED ORAL at 21:25

## 2024-05-06 RX ADMIN — PANTOPRAZOLE SODIUM 40 MG: 40 TABLET, DELAYED RELEASE ORAL at 17:36

## 2024-05-06 RX ADMIN — PIPERACILLIN AND TAZOBACTAM 3375 MG: 3; .375 INJECTION, POWDER, LYOPHILIZED, FOR SOLUTION INTRAVENOUS at 12:34

## 2024-05-06 RX ADMIN — POTASSIUM CHLORIDE 10 MEQ: 10 INJECTION, SOLUTION INTRAVENOUS at 11:04

## 2024-05-06 RX ADMIN — POTASSIUM CHLORIDE 20 MEQ: 750 TABLET, FILM COATED, EXTENDED RELEASE ORAL at 08:04

## 2024-05-06 RX ADMIN — OCTREOTIDE ACETATE 50 MCG: 50 INJECTION, SOLUTION INTRAVENOUS; SUBCUTANEOUS at 17:36

## 2024-05-06 RX ADMIN — SODIUM CHLORIDE, PRESERVATIVE FREE 10 ML: 5 INJECTION INTRAVENOUS at 08:05

## 2024-05-06 RX ADMIN — POTASSIUM CHLORIDE 10 MEQ: 10 INJECTION, SOLUTION INTRAVENOUS at 13:31

## 2024-05-06 RX ADMIN — SUCRALFATE 1 G: 1 TABLET ORAL at 21:25

## 2024-05-07 ENCOUNTER — OFFICE VISIT (OUTPATIENT)
Facility: CLINIC | Age: 69
End: 2024-05-07

## 2024-05-07 VITALS
OXYGEN SATURATION: 97 % | HEIGHT: 63 IN | BODY MASS INDEX: 22.27 KG/M2 | RESPIRATION RATE: 17 BRPM | SYSTOLIC BLOOD PRESSURE: 130 MMHG | WEIGHT: 125.66 LBS | HEART RATE: 80 BPM | DIASTOLIC BLOOD PRESSURE: 70 MMHG | TEMPERATURE: 97.7 F

## 2024-05-07 DIAGNOSIS — C34.31 MALIGNANT NEOPLASM OF LOWER LOBE OF RIGHT LUNG (HCC): Primary | Chronic | ICD-10-CM

## 2024-05-07 DIAGNOSIS — E43 SEVERE PROTEIN-CALORIE MALNUTRITION (GOMEZ: LESS THAN 60% OF STANDARD WEIGHT) (HCC): ICD-10-CM

## 2024-05-07 DIAGNOSIS — B18.2 CHRONIC HEPATITIS C WITHOUT HEPATIC COMA (HCC): Chronic | ICD-10-CM

## 2024-05-07 DIAGNOSIS — J18.9 PNEUMONIA OF RIGHT LOWER LOBE DUE TO INFECTIOUS ORGANISM: ICD-10-CM

## 2024-05-07 DIAGNOSIS — R41.89 IMPAIRED COGNITION: ICD-10-CM

## 2024-05-07 DIAGNOSIS — F31.32 BIPOLAR AFFECTIVE DISORDER, CURRENTLY DEPRESSED, MODERATE (HCC): Chronic | ICD-10-CM

## 2024-05-07 LAB
ABO + RH BLD: NORMAL
ANION GAP SERPL CALC-SCNC: 5 MMOL/L (ref 5–15)
ANTIGENS PRESENT RBC DONR: NORMAL
BACTERIA SPEC CULT: NORMAL
BACTERIA SPEC CULT: NORMAL
BLD PROD TYP BPU: NORMAL
BLOOD BANK BLOOD PRODUCT EXPIRATION DATE: NORMAL
BLOOD BANK BLOOD PRODUCT EXPIRATION DATE: NORMAL
BLOOD BANK CMNT PATIENT-IMP: NORMAL
BLOOD BANK DISPENSE STATUS: NORMAL
BLOOD BANK ISBT PRODUCT BLOOD TYPE: 6200
BLOOD BANK ISBT PRODUCT BLOOD TYPE: 6200
BLOOD BANK PRODUCT CODE: NORMAL
BLOOD BANK PRODUCT CODE: NORMAL
BLOOD BANK UNIT TYPE AND RH: NORMAL
BLOOD BANK UNIT TYPE AND RH: NORMAL
BLOOD GROUP ANTIBODIES SERPL: NORMAL
BLOOD GROUP ANTIBODIES SERPL: NORMAL
BPU ID: NORMAL
BUN SERPL-MCNC: 16 MG/DL (ref 6–20)
BUN/CREAT SERPL: 22 (ref 12–20)
CALCIUM SERPL-MCNC: 8.3 MG/DL (ref 8.5–10.1)
CHLORIDE SERPL-SCNC: 105 MMOL/L (ref 97–108)
CO2 SERPL-SCNC: 32 MMOL/L (ref 21–32)
CREAT SERPL-MCNC: 0.74 MG/DL (ref 0.55–1.02)
CROSSMATCH RESULT: NORMAL
DAT C3B/C3D, C3D: NORMAL
DAT IGG-SP REAG RBC QL: NORMAL
DAT POLY-SP REAG RBC QL: NORMAL
EKG DIAGNOSIS: NORMAL
EKG Q-T INTERVAL: 300 MS
EKG QRS DURATION: 70 MS
EKG QTC CALCULATION (BAZETT): 431 MS
EKG R AXIS: 36 DEGREES
EKG T AXIS: 42 DEGREES
EKG VENTRICULAR RATE: 124 BPM
ERYTHROCYTE [DISTWIDTH] IN BLOOD BY AUTOMATED COUNT: 17.8 % (ref 11.5–14.5)
GLUCOSE SERPL-MCNC: 106 MG/DL (ref 65–100)
HCT VFR BLD AUTO: 27 % (ref 35–47)
HGB BLD-MCNC: 8.4 G/DL (ref 11.5–16)
MCH RBC QN AUTO: 28.5 PG (ref 26–34)
MCHC RBC AUTO-ENTMCNC: 31.1 G/DL (ref 30–36.5)
MCV RBC AUTO: 91.5 FL (ref 80–99)
NRBC # BLD: 0.02 K/UL (ref 0–0.01)
NRBC BLD-RTO: 0.2 PER 100 WBC
PLATELET # BLD AUTO: 294 K/UL (ref 150–400)
PMV BLD AUTO: 10.6 FL (ref 8.9–12.9)
POTASSIUM SERPL-SCNC: 3.4 MMOL/L (ref 3.5–5.1)
RBC # BLD AUTO: 2.95 M/UL (ref 3.8–5.2)
SERVICE CMNT-IMP: NORMAL
SERVICE CMNT-IMP: NORMAL
SODIUM SERPL-SCNC: 142 MMOL/L (ref 136–145)
UNIT DIVISION: 0
UNIT ISSUE DATE/TIME: NORMAL
UNIT ISSUE DATE/TIME: NORMAL
VANCOMYCIN SERPL-MCNC: 13.9 UG/ML
WBC # BLD AUTO: 12.2 K/UL (ref 3.6–11)

## 2024-05-07 PROCEDURE — 6370000000 HC RX 637 (ALT 250 FOR IP): Performed by: PHYSICIAN ASSISTANT

## 2024-05-07 PROCEDURE — 6370000000 HC RX 637 (ALT 250 FOR IP): Performed by: INTERNAL MEDICINE

## 2024-05-07 PROCEDURE — 36415 COLL VENOUS BLD VENIPUNCTURE: CPT

## 2024-05-07 PROCEDURE — 6370000000 HC RX 637 (ALT 250 FOR IP): Performed by: NURSE PRACTITIONER

## 2024-05-07 PROCEDURE — 80048 BASIC METABOLIC PNL TOTAL CA: CPT

## 2024-05-07 PROCEDURE — 6360000002 HC RX W HCPCS: Performed by: NURSE PRACTITIONER

## 2024-05-07 PROCEDURE — 6360000002 HC RX W HCPCS: Performed by: INTERNAL MEDICINE

## 2024-05-07 PROCEDURE — 2580000003 HC RX 258: Performed by: INTERNAL MEDICINE

## 2024-05-07 PROCEDURE — 85027 COMPLETE CBC AUTOMATED: CPT

## 2024-05-07 PROCEDURE — 2700000000 HC OXYGEN THERAPY PER DAY

## 2024-05-07 PROCEDURE — 80202 ASSAY OF VANCOMYCIN: CPT

## 2024-05-07 RX ORDER — AMOXICILLIN AND CLAVULANATE POTASSIUM 875; 125 MG/1; MG/1
1 TABLET, FILM COATED ORAL 2 TIMES DAILY
Qty: 14 TABLET | Refills: 0 | Status: SHIPPED | OUTPATIENT
Start: 2024-05-07 | End: 2024-05-14

## 2024-05-07 RX ORDER — FUROSEMIDE 20 MG/1
20 TABLET ORAL 2 TIMES DAILY
Qty: 60 TABLET | Refills: 3 | Status: SHIPPED | OUTPATIENT
Start: 2024-05-07

## 2024-05-07 RX ORDER — DIVALPROEX SODIUM 125 MG/1
250 CAPSULE, COATED PELLETS ORAL 2 TIMES DAILY
Status: DISCONTINUED | OUTPATIENT
Start: 2024-05-07 | End: 2024-05-07 | Stop reason: HOSPADM

## 2024-05-07 RX ORDER — DIAZEPAM 2 MG/1
1 TABLET ORAL NIGHTLY
Qty: 2 TABLET | Refills: 0 | Status: SHIPPED | OUTPATIENT
Start: 2024-05-07 | End: 2024-05-10

## 2024-05-07 RX ORDER — POTASSIUM CHLORIDE 1500 MG/1
40 TABLET, EXTENDED RELEASE ORAL DAILY
Qty: 60 TABLET | Refills: 0 | Status: SHIPPED
Start: 2024-05-08 | End: 2024-06-07

## 2024-05-07 RX ADMIN — SODIUM CHLORIDE, PRESERVATIVE FREE 10 ML: 5 INJECTION INTRAVENOUS at 09:43

## 2024-05-07 RX ADMIN — OCTREOTIDE ACETATE 50 MCG: 50 INJECTION, SOLUTION INTRAVENOUS; SUBCUTANEOUS at 05:13

## 2024-05-07 RX ADMIN — FUROSEMIDE 20 MG: 20 TABLET ORAL at 09:40

## 2024-05-07 RX ADMIN — SUCRALFATE 1 G: 1 TABLET ORAL at 06:52

## 2024-05-07 RX ADMIN — METOPROLOL TARTRATE 25 MG: 25 TABLET, FILM COATED ORAL at 09:41

## 2024-05-07 RX ADMIN — PIPERACILLIN AND TAZOBACTAM 3375 MG: 3; .375 INJECTION, POWDER, LYOPHILIZED, FOR SOLUTION INTRAVENOUS at 12:46

## 2024-05-07 RX ADMIN — POTASSIUM CHLORIDE 40 MEQ: 750 TABLET, FILM COATED, EXTENDED RELEASE ORAL at 09:39

## 2024-05-07 RX ADMIN — GUAIFENESIN 600 MG: 600 TABLET, EXTENDED RELEASE ORAL at 09:41

## 2024-05-07 RX ADMIN — DIVALPROEX SODIUM 250 MG: 125 CAPSULE ORAL at 11:25

## 2024-05-07 RX ADMIN — PANTOPRAZOLE SODIUM 40 MG: 40 TABLET, DELAYED RELEASE ORAL at 06:52

## 2024-05-07 RX ADMIN — SENNOSIDES AND DOCUSATE SODIUM 1 TABLET: 50; 8.6 TABLET ORAL at 09:40

## 2024-05-07 RX ADMIN — VANCOMYCIN HYDROCHLORIDE 1000 MG: 1 INJECTION, POWDER, LYOPHILIZED, FOR SOLUTION INTRAVENOUS at 04:08

## 2024-05-07 RX ADMIN — PIPERACILLIN AND TAZOBACTAM 3375 MG: 3; .375 INJECTION, POWDER, LYOPHILIZED, FOR SOLUTION INTRAVENOUS at 05:12

## 2024-05-07 NOTE — DISCHARGE INSTRUCTIONS
HOSPITALIST DISCHARGE INSTRUCTIONS    NAME: Lillie Claire   :  1955   MRN:  [id number]     Date/Time:  2024 11:46 AM    ADMIT DATE: 2024   DISCHARGE DATE: 2024     Attending Physician: Ailyn Mcpherson MD  Recurrent sepsis and pneumonia   metastatic undifferentiated  lung carcinoma  Recurrent  rapid response overnight for new onset A-fib with RVR on   Recurrent fever   Hypokalemia   Recurrent fever   Acute on chronic anemia  Hypokalemia recurrent  Recurrent EMILY resolved  Acute septic encephalopathy- in the setting of infection   Right lobe lung mass  Acute pulmonary edema  Acute on chronic anemia due to acute gi bleeding   Iron deficiency anemia  Melena  Macular rash in back and thigh noted likely utricaria  Hyponatremia multifactorial in etiology  Hypokalemia  Hypertension  Hyperlipidemia  Bipolar disease  Right flank pain   Mildly dilated aortic root. Ao root diameter is 4.1 cm     Medications: Per above medication reconciliation.    Pain Management: per above medications    Recommended diet: regular diet    Recommended activity: activity as tolerated    Wound care: None    Indwelling devices:  None    Supplemental Oxygen: 2liter     Required Lab work: Per SNF routine    Glucose management:  None    Code status: DNR

## 2024-05-07 NOTE — PLAN OF CARE
Problem: Confusion  Goal: Confusion, delirium, dementia, or psychosis is improved or at baseline  Description: INTERVENTIONS:  1. Assess for possible contributors to thought disturbance, including medications, impaired vision or hearing, underlying metabolic abnormalities, dehydration, psychiatric diagnoses, and notify attending LIP  2. [place] high risk fall precautions, as indicated  3. Provide frequent short contacts to provide reality reorientation, refocusing and direction  4. Decrease environmental stimuli, including noise as appropriate  5. Monitor and intervene to maintain adequate nutrition, hydration, elimination, sleep and activity  6. If unable to ensure safety without constant attention obtain sitter and review sitter guidelines with assigned personnel  7. Initiate Psychosocial CNS and Spiritual Care consult, as indicated  4/12/2024 1328 by Kirsten Amor, RN  Outcome: Not Progressing  4/12/2024 1213 by Nina Herrera, RN  Outcome: Not Progressing  Flowsheets (Taken 4/12/2024 0725 by Kirsten Amor, RN)  Effect of thought disturbance (confusion, delirium, dementia, or psychosis) are managed with adequate functional status:   Assess for contributors to thought disturbance, including medications, impaired vision or hearing, underlying metabolic abnormalities, dehydration, psychiatric diagnoses, notify LIP   [place] high risk fall precautions, as indicated     Problem: Physical Therapy - Adult  Goal: By Discharge: Performs mobility at highest level of function for planned discharge setting.  See evaluation for individualized goals.  Description: FUNCTIONAL STATUS PRIOR TO ADMISSION: Patient was modified independent using a rolling walker for functional mobility.  Patient is a poor historian overall.    HOME SUPPORT PRIOR TO ADMISSION: The patient lived with brother and his wife per patient.  She is unable to verbalize what they assist her with.    Physical Therapy Goals  Initiated 4/8/2024  1.  
  Problem: Discharge Planning  Goal: Discharge to home or other facility with appropriate resources  4/16/2024 1015 by Kirsten Amor RN  Outcome: Not Progressing  Flowsheets (Taken 4/16/2024 0730)  Discharge to home or other facility with appropriate resources:   Identify barriers to discharge with patient and caregiver   Arrange for needed discharge resources and transportation as appropriate   Identify discharge learning needs (meds, wound care, etc)  4/15/2024 2254 by Blessed Drake RN  Outcome: Progressing  Flowsheets (Taken 4/15/2024 1935)  Discharge to home or other facility with appropriate resources: Identify barriers to discharge with patient and caregiver     Problem: Chronic Conditions and Co-morbidities  Goal: Patient's chronic conditions and co-morbidity symptoms are monitored and maintained or improved  4/16/2024 1015 by Kirsten Amor RN  Outcome: Not Progressing  Flowsheets (Taken 4/16/2024 0730)  Care Plan - Patient's Chronic Conditions and Co-Morbidity Symptoms are Monitored and Maintained or Improved: Monitor and assess patient's chronic conditions and comorbid symptoms for stability, deterioration, or improvement  4/15/2024 2254 by Blessed Drake RN  Outcome: Progressing  Flowsheets (Taken 4/15/2024 1935)  Care Plan - Patient's Chronic Conditions and Co-Morbidity Symptoms are Monitored and Maintained or Improved: Monitor and assess patient's chronic conditions and comorbid symptoms for stability, deterioration, or improvement     Problem: Nutrition Deficit:  Goal: Optimize nutritional status  4/16/2024 1015 by Kirsten Amor RN  Outcome: Not Progressing  4/15/2024 2254 by Blessed Drake RN  Outcome: Progressing  Flowsheets (Taken 4/15/2024 1354 by Bettina Morris, BERNARD)  Nutrient intake appropriate for improving, restoring, or maintaining nutritional needs: Monitor oral intake, labs, and treatment plans     Problem: Safety - Adult  Goal: Free from fall injury  4/16/2024 1015 by Mt 
  Problem: Discharge Planning  Goal: Discharge to home or other facility with appropriate resources  4/16/2024 2346 by Rosa Wilkins, RN  Outcome: Progressing  4/16/2024 1015 by Kirsten Amor, RN  Outcome: Not Progressing  Flowsheets (Taken 4/16/2024 0730)  Discharge to home or other facility with appropriate resources:   Identify barriers to discharge with patient and caregiver   Arrange for needed discharge resources and transportation as appropriate   Identify discharge learning needs (meds, wound care, etc)     Problem: Chronic Conditions and Co-morbidities  Goal: Patient's chronic conditions and co-morbidity symptoms are monitored and maintained or improved  4/16/2024 1015 by Kirsten Amor, RN  Outcome: Not Progressing  Flowsheets (Taken 4/16/2024 0730)  Care Plan - Patient's Chronic Conditions and Co-Morbidity Symptoms are Monitored and Maintained or Improved: Monitor and assess patient's chronic conditions and comorbid symptoms for stability, deterioration, or improvement     Problem: Nutrition Deficit:  Goal: Optimize nutritional status  4/16/2024 1015 by Kirsten Amor, RN  Outcome: Not Progressing     
  Problem: Discharge Planning  Goal: Discharge to home or other facility with appropriate resources  Outcome: Not Progressing     Problem: Safety - Adult  Goal: Free from fall injury  Outcome: Progressing     Problem: Skin/Tissue Integrity  Goal: Absence of new skin breakdown  Description: 1.  Monitor for areas of redness and/or skin breakdown  2.  Assess vascular access sites hourly  3.  Every 4-6 hours minimum:  Change oxygen saturation probe site  4.  Every 4-6 hours:  If on nasal continuous positive airway pressure, respiratory therapy assess nares and determine need for appliance change or resting period.  Outcome: Progressing     Problem: Confusion  Goal: Confusion, delirium, dementia, or psychosis is improved or at baseline  Description: INTERVENTIONS:  1. Assess for possible contributors to thought disturbance, including medications, impaired vision or hearing, underlying metabolic abnormalities, dehydration, psychiatric diagnoses, and notify attending LIP  2. [place] high risk fall precautions, as indicated  3. Provide frequent short contacts to provide reality reorientation, refocusing and direction  4. Decrease environmental stimuli, including noise as appropriate  5. Monitor and intervene to maintain adequate nutrition, hydration, elimination, sleep and activity  6. If unable to ensure safety without constant attention obtain sitter and review sitter guidelines with assigned personnel  7. Initiate Psychosocial CNS and Spiritual Care consult, as indicated  Outcome: Progressing     Problem: Pain  Goal: Verbalizes/displays adequate comfort level or baseline comfort level  Outcome: Progressing     Problem: Chronic Conditions and Co-morbidities  Goal: Patient's chronic conditions and co-morbidity symptoms are monitored and maintained or improved  Outcome: Progressing     Problem: Discharge Planning  Goal: Discharge to home or other facility with appropriate resources  Outcome: Not Progressing     
  Problem: Pain  Goal: Verbalizes/displays adequate comfort level or baseline comfort level  Outcome: Progressing   Patient does not voice of pain or discomfort    Problem: Safety - Adult  Goal: Free from fall injury  Outcome: Progressing   Patient remains free from falls or injury  
  Problem: Physical Therapy - Adult  Goal: By Discharge: Performs mobility at highest level of function for planned discharge setting.  See evaluation for individualized goals.  Description: FUNCTIONAL STATUS PRIOR TO ADMISSION: Patient was modified independent using a rolling walker for functional mobility.  Patient is a poor historian overall.    HOME SUPPORT PRIOR TO ADMISSION: The patient lived with brother and his wife per patient.  She is unable to verbalize what they assist her with.    Physical Therapy Goals  Initiated 4/8/2024  1.  Patient will move from supine to sit and sit to supine in bed with modified independence within 7 day(s).    2.  Patient will perform sit to stand with supervision/set-up within 7 day(s).  3.  Patient will transfer from bed to chair and chair to bed with supervision/set-up using the least restrictive device within 7 day(s).  4.  Patient will ambulate with supervision/set-up for 50 feet with the least restrictive device within 7 day(s).   5.  Patient will ascend/descend 4 stairs with 1 handrail(s) with contact guard assist within 7 day(s).  Outcome: Progressing   PHYSICAL THERAPY EVALUATION    Patient: Lillie Claire (68 y.o. female)  Date: 4/8/2024  Primary Diagnosis: Sepsis ([facility]) [A41.9]       Precautions: Restrictions/Precautions: Fall Risk                      ASSESSMENT :   DEFICITS/IMPAIRMENTS:   The patient is limited by impaired balance, gait instability, generalized weakness, mild confusion, and decreased activity tolerance.  Currently up in chair and patient overall reluctant to participate but agreeable after encouragement.  Noted patient with stool when attempting to stand and overall CGA to come to stand and requires maxA for clean up.  Amb approx 5 feet forward and backward with RW and CGA.  Anticipate patient is not far from her baseline and recommend  PT and supervision/assist from family.  If family unable to provide some assist may benefit from SNF rehab.  Will 
  Problem: Safety - Adult  Goal: Free from fall injury  4/14/2024 0926 by Jodie Elizabeth RN  Outcome: Progressing  4/13/2024 2346 by Kirsten Santacruz RN  Outcome: Progressing     Problem: Discharge Planning  Goal: Discharge to home or other facility with appropriate resources  4/14/2024 0926 by Jodie Elizabeth RN  Outcome: Progressing  4/13/2024 2346 by Kirsten Santacruz RN  Outcome: Progressing     Problem: Skin/Tissue Integrity  Goal: Absence of new skin breakdown  Description: 1.  Monitor for areas of redness and/or skin breakdown  2.  Assess vascular access sites hourly  3.  Every 4-6 hours minimum:  Change oxygen saturation probe site  4.  Every 4-6 hours:  If on nasal continuous positive airway pressure, respiratory therapy assess nares and determine need for appliance change or resting period.  4/14/2024 0926 by Jodie Elizabeth RN  Outcome: Progressing  4/13/2024 2346 by Kirsten Santacruz RN  Outcome: Progressing     Problem: Confusion  Goal: Confusion, delirium, dementia, or psychosis is improved or at baseline  Description: INTERVENTIONS:  1. Assess for possible contributors to thought disturbance, including medications, impaired vision or hearing, underlying metabolic abnormalities, dehydration, psychiatric diagnoses, and notify attending LIP  2. [place] high risk fall precautions, as indicated  3. Provide frequent short contacts to provide reality reorientation, refocusing and direction  4. Decrease environmental stimuli, including noise as appropriate  5. Monitor and intervene to maintain adequate nutrition, hydration, elimination, sleep and activity  6. If unable to ensure safety without constant attention obtain sitter and review sitter guidelines with assigned personnel  7. Initiate Psychosocial CNS and Spiritual Care consult, as indicated  Outcome: Progressing     Problem: Pain  Goal: Verbalizes/displays adequate comfort level or baseline comfort level  4/14/2024 0926 by Jodie Elizabeth RN  Outcome: 
  Problem: Safety - Adult  Goal: Free from fall injury  4/17/2024 1012 by Jodie Elizabeth RN  Outcome: Progressing  4/16/2024 2346 by Rosa Wilkins RN  Outcome: Progressing     Problem: Discharge Planning  Goal: Discharge to home or other facility with appropriate resources  4/17/2024 1012 by Jodie Elizabeth RN  Outcome: Progressing  4/16/2024 2346 by Rosa Wilkins RN  Outcome: Progressing     Problem: Skin/Tissue Integrity  Goal: Absence of new skin breakdown  Description: 1.  Monitor for areas of redness and/or skin breakdown  2.  Assess vascular access sites hourly  3.  Every 4-6 hours minimum:  Change oxygen saturation probe site  4.  Every 4-6 hours:  If on nasal continuous positive airway pressure, respiratory therapy assess nares and determine need for appliance change or resting period.  4/17/2024 1012 by Jodie Elizabeth RN  Outcome: Progressing  4/16/2024 2346 by Rosa Wilkins RN  Outcome: Progressing     Problem: Confusion  Goal: Confusion, delirium, dementia, or psychosis is improved or at baseline  Description: INTERVENTIONS:  1. Assess for possible contributors to thought disturbance, including medications, impaired vision or hearing, underlying metabolic abnormalities, dehydration, psychiatric diagnoses, and notify attending LIP  2. [place] high risk fall precautions, as indicated  3. Provide frequent short contacts to provide reality reorientation, refocusing and direction  4. Decrease environmental stimuli, including noise as appropriate  5. Monitor and intervene to maintain adequate nutrition, hydration, elimination, sleep and activity  6. If unable to ensure safety without constant attention obtain sitter and review sitter guidelines with assigned personnel  7. Initiate Psychosocial CNS and Spiritual Care consult, as indicated  4/17/2024 1012 by Jodie Elizabeth RN  Outcome: Progressing  4/16/2024 2346 by Rosa Wilkins RN  Outcome: Progressing     Problem: Pain  Goal: Verbalizes/displays 
  Problem: Safety - Adult  Goal: Free from fall injury  4/17/2024 2040 by Rosa Wilkins RN  Outcome: Progressing  4/17/2024 1012 by Jodie Elizabeth RN  Outcome: Progressing     Problem: Discharge Planning  Goal: Discharge to home or other facility with appropriate resources  4/17/2024 2040 by Rosa Wilkins RN  Outcome: Progressing  4/17/2024 1012 by Jodie Elizabeth RN  Outcome: Progressing     Problem: Skin/Tissue Integrity  Goal: Absence of new skin breakdown  Description: 1.  Monitor for areas of redness and/or skin breakdown  2.  Assess vascular access sites hourly  3.  Every 4-6 hours minimum:  Change oxygen saturation probe site  4.  Every 4-6 hours:  If on nasal continuous positive airway pressure, respiratory therapy assess nares and determine need for appliance change or resting period.  4/17/2024 1012 by Jodie Elizabeth RN  Outcome: Progressing     Problem: Confusion  Goal: Confusion, delirium, dementia, or psychosis is improved or at baseline  Description: INTERVENTIONS:  1. Assess for possible contributors to thought disturbance, including medications, impaired vision or hearing, underlying metabolic abnormalities, dehydration, psychiatric diagnoses, and notify attending LIP  2. [place] high risk fall precautions, as indicated  3. Provide frequent short contacts to provide reality reorientation, refocusing and direction  4. Decrease environmental stimuli, including noise as appropriate  5. Monitor and intervene to maintain adequate nutrition, hydration, elimination, sleep and activity  6. If unable to ensure safety without constant attention obtain sitter and review sitter guidelines with assigned personnel  7. Initiate Psychosocial CNS and Spiritual Care consult, as indicated  4/17/2024 2040 by Rosa Wilkins RN  Outcome: Progressing  4/17/2024 1012 by Jodie Elizabeth RN  Outcome: Progressing     Problem: Pain  Goal: Verbalizes/displays adequate comfort level or baseline comfort level  4/17/2024 2040 
  Problem: Safety - Adult  Goal: Free from fall injury  4/19/2024 2102 by Rosa Wilkins RN  Outcome: Progressing  4/19/2024 0914 by Tata Fuller RN  Outcome: Progressing     Problem: Discharge Planning  Goal: Discharge to home or other facility with appropriate resources  4/19/2024 2102 by Rosa Wilkins RN  Outcome: Progressing  Flowsheets (Taken 4/19/2024 1920)  Discharge to home or other facility with appropriate resources: Identify barriers to discharge with patient and caregiver  4/19/2024 0914 by Tata Fuller RN  Outcome: Progressing  Flowsheets (Taken 4/19/2024 0800)  Discharge to home or other facility with appropriate resources:   Identify barriers to discharge with patient and caregiver   Arrange for needed discharge resources and transportation as appropriate   Identify discharge learning needs (meds, wound care, etc)   Refer to discharge planning if patient needs post-hospital services based on physician order or complex needs related to functional status, cognitive ability or social support system   Arrange for interpreters to assist at discharge as needed     Problem: Skin/Tissue Integrity  Goal: Absence of new skin breakdown  Description: 1.  Monitor for areas of redness and/or skin breakdown  2.  Assess vascular access sites hourly  3.  Every 4-6 hours minimum:  Change oxygen saturation probe site  4.  Every 4-6 hours:  If on nasal continuous positive airway pressure, respiratory therapy assess nares and determine need for appliance change or resting period.  4/19/2024 2102 by Rosa Wilkins RN  Outcome: Progressing  4/19/2024 0914 by Tata Fuller RN  Outcome: Progressing     Problem: Confusion  Goal: Confusion, delirium, dementia, or psychosis is improved or at baseline  Description: INTERVENTIONS:  1. Assess for possible contributors to thought disturbance, including medications, impaired vision or hearing, underlying metabolic abnormalities, dehydration, psychiatric diagnoses, 
  Problem: Safety - Adult  Goal: Free from fall injury  4/22/2024 2212 by Deysi Reddy RN  Outcome: Progressing  4/22/2024 1038 by Tata Lemus RN  Outcome: Progressing     Problem: Discharge Planning  Goal: Discharge to home or other facility with appropriate resources  4/22/2024 2212 by Deysi Reddy RN  Outcome: Progressing  4/22/2024 1038 by Tata Lemus RN  Outcome: Progressing     Problem: Skin/Tissue Integrity  Goal: Absence of new skin breakdown  Description: 1.  Monitor for areas of redness and/or skin breakdown  2.  Assess vascular access sites hourly  3.  Every 4-6 hours minimum:  Change oxygen saturation probe site  4.  Every 4-6 hours:  If on nasal continuous positive airway pressure, respiratory therapy assess nares and determine need for appliance change or resting period.  4/22/2024 2212 by Deysi Reddy RN  Outcome: Progressing  4/22/2024 1038 by Tata Lemus RN  Outcome: Progressing     Problem: Confusion  Goal: Confusion, delirium, dementia, or psychosis is improved or at baseline  Description: INTERVENTIONS:  1. Assess for possible contributors to thought disturbance, including medications, impaired vision or hearing, underlying metabolic abnormalities, dehydration, psychiatric diagnoses, and notify attending LIP  2. [place] high risk fall precautions, as indicated  3. Provide frequent short contacts to provide reality reorientation, refocusing and direction  4. Decrease environmental stimuli, including noise as appropriate  5. Monitor and intervene to maintain adequate nutrition, hydration, elimination, sleep and activity  6. If unable to ensure safety without constant attention obtain sitter and review sitter guidelines with assigned personnel  7. Initiate Psychosocial CNS and Spiritual Care consult, as indicated  4/22/2024 2212 by Deysi Reddy RN  Outcome: Progressing  4/22/2024 1038 by Tata Lemus RN  Outcome: Progressing     Problem: Pain  Goal: 
  Problem: Safety - Adult  Goal: Free from fall injury  4/23/2024 1030 by Tata Lemus RN  Outcome: Progressing  4/22/2024 2212 by Deysi Reddy RN  Outcome: Progressing     Problem: Discharge Planning  Goal: Discharge to home or other facility with appropriate resources  4/23/2024 1030 by Tata Lemus RN  Outcome: Progressing  4/22/2024 2212 by Deysi Reddy RN  Outcome: Progressing     Problem: Skin/Tissue Integrity  Goal: Absence of new skin breakdown  Description: 1.  Monitor for areas of redness and/or skin breakdown  2.  Assess vascular access sites hourly  3.  Every 4-6 hours minimum:  Change oxygen saturation probe site  4.  Every 4-6 hours:  If on nasal continuous positive airway pressure, respiratory therapy assess nares and determine need for appliance change or resting period.  4/23/2024 1030 by Tata Lemus RN  Outcome: Progressing  4/22/2024 2212 by Deysi Reddy RN  Outcome: Progressing     Problem: Confusion  Goal: Confusion, delirium, dementia, or psychosis is improved or at baseline  Description: INTERVENTIONS:  1. Assess for possible contributors to thought disturbance, including medications, impaired vision or hearing, underlying metabolic abnormalities, dehydration, psychiatric diagnoses, and notify attending LIP  2. [place] high risk fall precautions, as indicated  3. Provide frequent short contacts to provide reality reorientation, refocusing and direction  4. Decrease environmental stimuli, including noise as appropriate  5. Monitor and intervene to maintain adequate nutrition, hydration, elimination, sleep and activity  6. If unable to ensure safety without constant attention obtain sitter and review sitter guidelines with assigned personnel  7. Initiate Psychosocial CNS and Spiritual Care consult, as indicated  4/23/2024 1030 by Tata Lemus RN  Outcome: Progressing  4/22/2024 2212 by Deysi Reddy RN  Outcome: Progressing     Problem: Pain  Goal: 
  Problem: Safety - Adult  Goal: Free from fall injury  4/25/2024 0921 by Lang, Merlin, RN  Outcome: Progressing  4/24/2024 2252 by Effie Falcon RN  Outcome: Progressing     Problem: Discharge Planning  Goal: Discharge to home or other facility with appropriate resources  4/25/2024 0921 by Lang, Merlin, RN  Outcome: Progressing  4/24/2024 2252 by Effie Falcon RN  Outcome: Progressing     Problem: Skin/Tissue Integrity  Goal: Absence of new skin breakdown  Description: 1.  Monitor for areas of redness and/or skin breakdown  2.  Assess vascular access sites hourly  3.  Every 4-6 hours minimum:  Change oxygen saturation probe site  4.  Every 4-6 hours:  If on nasal continuous positive airway pressure, respiratory therapy assess nares and determine need for appliance change or resting period.  4/25/2024 0921 by Lang, Merlin, RN  Outcome: Progressing  4/24/2024 2252 by Effie Falcon RN  Outcome: Progressing     Problem: Pain  Goal: Verbalizes/displays adequate comfort level or baseline comfort level  Outcome: Progressing     Problem: Chronic Conditions and Co-morbidities  Goal: Patient's chronic conditions and co-morbidity symptoms are monitored and maintained or improved  Outcome: Progressing     Problem: Confusion  Goal: Confusion, delirium, dementia, or psychosis is improved or at baseline  Description: INTERVENTIONS:  1. Assess for possible contributors to thought disturbance, including medications, impaired vision or hearing, underlying metabolic abnormalities, dehydration, psychiatric diagnoses, and notify attending LIP  2. [place] high risk fall precautions, as indicated  3. Provide frequent short contacts to provide reality reorientation, refocusing and direction  4. Decrease environmental stimuli, including noise as appropriate  5. Monitor and intervene to maintain adequate nutrition, hydration, elimination, sleep and activity  6. If unable to ensure safety without constant attention obtain sitter and 
  Problem: Safety - Adult  Goal: Free from fall injury  4/3/2024 2352 by Myriam Fagan RN  Outcome: Progressing  4/3/2024 1824 by Lang, Merlin, RN  Outcome: Progressing     Problem: Discharge Planning  Goal: Discharge to home or other facility with appropriate resources  4/3/2024 2352 by Myriam Fagan RN  Outcome: Progressing  4/3/2024 1824 by Lang, Merlin, RN  Outcome: Progressing     Problem: Skin/Tissue Integrity  Goal: Absence of new skin breakdown  Description: 1.  Monitor for areas of redness and/or skin breakdown  2.  Assess vascular access sites hourly  3.  Every 4-6 hours minimum:  Change oxygen saturation probe site  4.  Every 4-6 hours:  If on nasal continuous positive airway pressure, respiratory therapy assess nares and determine need for appliance change or resting period.  4/3/2024 2352 by Myriam Fagan RN  Outcome: Progressing  4/3/2024 1824 by Lang, Merlin, RN  Outcome: Progressing     Problem: Confusion  Goal: Confusion, delirium, dementia, or psychosis is improved or at baseline  Description: INTERVENTIONS:  1. Assess for possible contributors to thought disturbance, including medications, impaired vision or hearing, underlying metabolic abnormalities, dehydration, psychiatric diagnoses, and notify attending LIP  2. [place] high risk fall precautions, as indicated  3. Provide frequent short contacts to provide reality reorientation, refocusing and direction  4. Decrease environmental stimuli, including noise as appropriate  5. Monitor and intervene to maintain adequate nutrition, hydration, elimination, sleep and activity  6. If unable to ensure safety without constant attention obtain sitter and review sitter guidelines with assigned personnel  7. Initiate Psychosocial CNS and Spiritual Care consult, as indicated  Outcome: Progressing     
  Problem: Safety - Adult  Goal: Free from fall injury  4/5/2024 2217 by Niurka Paul RN  Outcome: Progressing  Flowsheets  Taken 4/5/2024 1918 by Niurka Paul RN  Free From Fall Injury: Instruct family/caregiver on patient safety  Taken 4/5/2024 1026 by Rachael Jama RN  Free From Fall Injury: Instruct family/caregiver on patient safety  4/5/2024 1025 by Rachael Jama RN  Outcome: Progressing     Problem: Discharge Planning  Goal: Discharge to home or other facility with appropriate resources  4/5/2024 2217 by Niurka Paul RN  Outcome: Progressing  Flowsheets (Taken 4/5/2024 1918)  Discharge to home or other facility with appropriate resources: Identify barriers to discharge with patient and caregiver  4/5/2024 1025 by Rachael Jama RN  Outcome: Progressing  Flowsheets (Taken 4/5/2024 0800)  Discharge to home or other facility with appropriate resources:   Identify barriers to discharge with patient and caregiver   Arrange for needed discharge resources and transportation as appropriate   Identify discharge learning needs (meds, wound care, etc)   Refer to discharge planning if patient needs post-hospital services based on physician order or complex needs related to functional status, cognitive ability or social support system     Problem: Skin/Tissue Integrity  Goal: Absence of new skin breakdown  Description: 1.  Monitor for areas of redness and/or skin breakdown  2.  Assess vascular access sites hourly  3.  Every 4-6 hours minimum:  Change oxygen saturation probe site  4.  Every 4-6 hours:  If on nasal continuous positive airway pressure, respiratory therapy assess nares and determine need for appliance change or resting period.  4/5/2024 2217 by Niurka Paul RN  Outcome: Progressing  4/5/2024 1025 by Rachael Jama RN  Outcome: Progressing     Problem: Confusion  Goal: Confusion, delirium, dementia, or psychosis is improved or at baseline  Description: INTERVENTIONS:  1. Assess for possible 
  Problem: Safety - Adult  Goal: Free from fall injury  4/6/2024 2156 by Niurka Paul RN  Outcome: Progressing  Flowsheets (Taken 4/6/2024 1911)  Free From Fall Injury: Instruct family/caregiver on patient safety  4/6/2024 1603 by Pooja Bridges  Outcome: Progressing     Problem: Discharge Planning  Goal: Discharge to home or other facility with appropriate resources  4/6/2024 2156 by Niurka Paul RN  Outcome: Progressing  Flowsheets (Taken 4/6/2024 1911)  Discharge to home or other facility with appropriate resources: Identify barriers to discharge with patient and caregiver  4/6/2024 1603 by Pooja Bridges  Outcome: Progressing     Problem: Skin/Tissue Integrity  Goal: Absence of new skin breakdown  Description: 1.  Monitor for areas of redness and/or skin breakdown  2.  Assess vascular access sites hourly  3.  Every 4-6 hours minimum:  Change oxygen saturation probe site  4.  Every 4-6 hours:  If on nasal continuous positive airway pressure, respiratory therapy assess nares and determine need for appliance change or resting period.  4/6/2024 2156 by Niurka Paul RN  Outcome: Progressing  4/6/2024 1603 by Pooja Bridges  Outcome: Progressing     Problem: Confusion  Goal: Confusion, delirium, dementia, or psychosis is improved or at baseline  Description: INTERVENTIONS:  1. Assess for possible contributors to thought disturbance, including medications, impaired vision or hearing, underlying metabolic abnormalities, dehydration, psychiatric diagnoses, and notify attending LIP  2. [place] high risk fall precautions, as indicated  3. Provide frequent short contacts to provide reality reorientation, refocusing and direction  4. Decrease environmental stimuli, including noise as appropriate  5. Monitor and intervene to maintain adequate nutrition, hydration, elimination, sleep and activity  6. If unable to ensure safety without constant attention obtain sitter and review sitter guidelines 
  Problem: Safety - Adult  Goal: Free from fall injury  5/5/2024 0437 by Elba Forbes RN  Outcome: Progressing  5/4/2024 1514 by Tasha Oconnor RN  Outcome: Progressing     Problem: Skin/Tissue Integrity  Goal: Absence of new skin breakdown  Description: 1.  Monitor for areas of redness and/or skin breakdown  2.  Assess vascular access sites hourly  3.  Every 4-6 hours minimum:  Change oxygen saturation probe site  4.  Every 4-6 hours:  If on nasal continuous positive airway pressure, respiratory therapy assess nares and determine need for appliance change or resting period.  5/5/2024 0437 by Elba Forbes RN  Outcome: Progressing  5/4/2024 1514 by Tasha Oconnor RN  Outcome: Progressing     Problem: Confusion  Goal: Confusion, delirium, dementia, or psychosis is improved or at baseline  Description: INTERVENTIONS:  1. Assess for possible contributors to thought disturbance, including medications, impaired vision or hearing, underlying metabolic abnormalities, dehydration, psychiatric diagnoses, and notify attending LIP  2. [place] high risk fall precautions, as indicated  3. Provide frequent short contacts to provide reality reorientation, refocusing and direction  4. Decrease environmental stimuli, including noise as appropriate  5. Monitor and intervene to maintain adequate nutrition, hydration, elimination, sleep and activity  6. If unable to ensure safety without constant attention obtain sitter and review sitter guidelines with assigned personnel  7. Initiate Psychosocial CNS and Spiritual Care consult, as indicated  5/5/2024 0437 by Elba Forbes RN  Outcome: Progressing  5/4/2024 1514 by Tasha Oconnor RN  Outcome: Progressing     Problem: Pain  Goal: Verbalizes/displays adequate comfort level or baseline comfort level  5/5/2024 0437 by Elba Forbes RN  Outcome: Progressing  5/4/2024 1514 by Tasha Oconnor RN  Outcome: Progressing     
  Problem: Safety - Adult  Goal: Free from fall injury  5/5/2024 1042 by Tasha Oconnor RN  Outcome: Progressing  5/5/2024 0437 by Elba Forbes RN  Outcome: Progressing     Problem: Discharge Planning  Goal: Discharge to home or other facility with appropriate resources  5/5/2024 1042 by Tasha Oconnor RN  Outcome: Progressing  5/5/2024 0437 by Elba Forbes RN  Outcome: Progressing     Problem: Skin/Tissue Integrity  Goal: Absence of new skin breakdown  Description: 1.  Monitor for areas of redness and/or skin breakdown  2.  Assess vascular access sites hourly  3.  Every 4-6 hours minimum:  Change oxygen saturation probe site  4.  Every 4-6 hours:  If on nasal continuous positive airway pressure, respiratory therapy assess nares and determine need for appliance change or resting period.  5/5/2024 1042 by Tasha Oconnor RN  Outcome: Progressing  5/5/2024 0437 by Elba Forbes RN  Outcome: Progressing     Problem: Confusion  Goal: Confusion, delirium, dementia, or psychosis is improved or at baseline  Description: INTERVENTIONS:  1. Assess for possible contributors to thought disturbance, including medications, impaired vision or hearing, underlying metabolic abnormalities, dehydration, psychiatric diagnoses, and notify attending LIP  2. [place] high risk fall precautions, as indicated  3. Provide frequent short contacts to provide reality reorientation, refocusing and direction  4. Decrease environmental stimuli, including noise as appropriate  5. Monitor and intervene to maintain adequate nutrition, hydration, elimination, sleep and activity  6. If unable to ensure safety without constant attention obtain sitter and review sitter guidelines with assigned personnel  7. Initiate Psychosocial CNS and Spiritual Care consult, as indicated  5/5/2024 1042 by Tasha Oconnor RN  Outcome: Progressing  5/5/2024 0437 by Elba Forbes RN  Outcome: Progressing     Problem: Pain  Goal: Verbalizes/displays 
  Problem: Safety - Adult  Goal: Free from fall injury  5/7/2024 0022 by Lila Pagan RN  Outcome: Progressing  5/6/2024 1753 by Gil Dia RN  Outcome: Progressing     Problem: Discharge Planning  Goal: Discharge to home or other facility with appropriate resources  Outcome: Progressing     Problem: Skin/Tissue Integrity  Goal: Absence of new skin breakdown  Description: 1.  Monitor for areas of redness and/or skin breakdown  2.  Assess vascular access sites hourly  3.  Every 4-6 hours minimum:  Change oxygen saturation probe site  4.  Every 4-6 hours:  If on nasal continuous positive airway pressure, respiratory therapy assess nares and determine need for appliance change or resting period.  5/7/2024 0022 by Lila Pagan RN  Outcome: Progressing  5/6/2024 1753 by Gil Dia RN  Outcome: Progressing     Problem: Confusion  Goal: Confusion, delirium, dementia, or psychosis is improved or at baseline  Description: INTERVENTIONS:  1. Assess for possible contributors to thought disturbance, including medications, impaired vision or hearing, underlying metabolic abnormalities, dehydration, psychiatric diagnoses, and notify attending LIP  2. [place] high risk fall precautions, as indicated  3. Provide frequent short contacts to provide reality reorientation, refocusing and direction  4. Decrease environmental stimuli, including noise as appropriate  5. Monitor and intervene to maintain adequate nutrition, hydration, elimination, sleep and activity  6. If unable to ensure safety without constant attention obtain sitter and review sitter guidelines with assigned personnel  7. Initiate Psychosocial CNS and Spiritual Care consult, as indicated  Outcome: Progressing     Problem: Pain  Goal: Verbalizes/displays adequate comfort level or baseline comfort level  5/7/2024 0022 by Lila Pagan RN  Outcome: Progressing  5/6/2024 1753 by Gil Dia RN  Outcome: Progressing     
  Problem: Safety - Adult  Goal: Free from fall injury  5/7/2024 1039 by Nati Powell RN  Outcome: Progressing  5/7/2024 0022 by Lila Pagan RN  Outcome: Progressing     Problem: Discharge Planning  Goal: Discharge to home or other facility with appropriate resources  5/7/2024 1039 by Nati Powell RN  Outcome: Progressing  5/7/2024 0022 by Lila Pagan RN  Outcome: Progressing     Problem: Skin/Tissue Integrity  Goal: Absence of new skin breakdown  Description: 1.  Monitor for areas of redness and/or skin breakdown  2.  Assess vascular access sites hourly  3.  Every 4-6 hours minimum:  Change oxygen saturation probe site  4.  Every 4-6 hours:  If on nasal continuous positive airway pressure, respiratory therapy assess nares and determine need for appliance change or resting period.  5/7/2024 1039 by Nati Powell RN  Outcome: Progressing  5/7/2024 0022 by Lila Pagan RN  Outcome: Progressing     Problem: Confusion  Goal: Confusion, delirium, dementia, or psychosis is improved or at baseline  Description: INTERVENTIONS:  1. Assess for possible contributors to thought disturbance, including medications, impaired vision or hearing, underlying metabolic abnormalities, dehydration, psychiatric diagnoses, and notify attending LIP  2. [place] high risk fall precautions, as indicated  3. Provide frequent short contacts to provide reality reorientation, refocusing and direction  4. Decrease environmental stimuli, including noise as appropriate  5. Monitor and intervene to maintain adequate nutrition, hydration, elimination, sleep and activity  6. If unable to ensure safety without constant attention obtain sitter and review sitter guidelines with assigned personnel  7. Initiate Psychosocial CNS and Spiritual Care consult, as indicated  5/7/2024 1039 by Nati Powell RN  Outcome: Progressing  5/7/2024 0022 by Lila Pagan RN  Outcome: Progressing     Problem: Pain  Goal: 
  Problem: Safety - Adult  Goal: Free from fall injury  5/7/2024 1542 by Nati Powell RN  Outcome: Adequate for Discharge  5/7/2024 1039 by Nati Powell RN  Outcome: Progressing     Problem: Discharge Planning  Goal: Discharge to home or other facility with appropriate resources  5/7/2024 1542 by Nati Powell RN  Outcome: Adequate for Discharge  5/7/2024 1039 by Nati Powell RN  Outcome: Progressing     Problem: Skin/Tissue Integrity  Goal: Absence of new skin breakdown  Description: 1.  Monitor for areas of redness and/or skin breakdown  2.  Assess vascular access sites hourly  3.  Every 4-6 hours minimum:  Change oxygen saturation probe site  4.  Every 4-6 hours:  If on nasal continuous positive airway pressure, respiratory therapy assess nares and determine need for appliance change or resting period.  5/7/2024 1542 by Nati Powell RN  Outcome: Adequate for Discharge  5/7/2024 1039 by Nati Powell RN  Outcome: Progressing     Problem: Confusion  Goal: Confusion, delirium, dementia, or psychosis is improved or at baseline  Description: INTERVENTIONS:  1. Assess for possible contributors to thought disturbance, including medications, impaired vision or hearing, underlying metabolic abnormalities, dehydration, psychiatric diagnoses, and notify attending LIP  2. [place] high risk fall precautions, as indicated  3. Provide frequent short contacts to provide reality reorientation, refocusing and direction  4. Decrease environmental stimuli, including noise as appropriate  5. Monitor and intervene to maintain adequate nutrition, hydration, elimination, sleep and activity  6. If unable to ensure safety without constant attention obtain sitter and review sitter guidelines with assigned personnel  7. Initiate Psychosocial CNS and Spiritual Care consult, as indicated  5/7/2024 1542 by Nati Powell RN  Outcome: Adequate for Discharge  5/7/2024 1039 by Nati Powell 
  Problem: Safety - Adult  Goal: Free from fall injury  Outcome: Progressing     
  Problem: Safety - Adult  Goal: Free from fall injury  Outcome: Progressing     Problem: Discharge Planning  Goal: Discharge to home or other facility with appropriate resources  Outcome: Progressing     Problem: Skin/Tissue Integrity  Goal: Absence of new skin breakdown  Description: 1.  Monitor for areas of redness and/or skin breakdown  2.  Assess vascular access sites hourly  3.  Every 4-6 hours minimum:  Change oxygen saturation probe site  4.  Every 4-6 hours:  If on nasal continuous positive airway pressure, respiratory therapy assess nares and determine need for appliance change or resting period.  Outcome: Progressing     
  Problem: Safety - Adult  Goal: Free from fall injury  Outcome: Progressing     Problem: Discharge Planning  Goal: Discharge to home or other facility with appropriate resources  Outcome: Progressing     Problem: Skin/Tissue Integrity  Goal: Absence of new skin breakdown  Description: 1.  Monitor for areas of redness and/or skin breakdown  2.  Assess vascular access sites hourly  3.  Every 4-6 hours minimum:  Change oxygen saturation probe site  4.  Every 4-6 hours:  If on nasal continuous positive airway pressure, respiratory therapy assess nares and determine need for appliance change or resting period.  Outcome: Progressing     Problem: Confusion  Goal: Confusion, delirium, dementia, or psychosis is improved or at baseline  Description: INTERVENTIONS:  1. Assess for possible contributors to thought disturbance, including medications, impaired vision or hearing, underlying metabolic abnormalities, dehydration, psychiatric diagnoses, and notify attending LIP  2. [place] high risk fall precautions, as indicated  3. Provide frequent short contacts to provide reality reorientation, refocusing and direction  4. Decrease environmental stimuli, including noise as appropriate  5. Monitor and intervene to maintain adequate nutrition, hydration, elimination, sleep and activity  6. If unable to ensure safety without constant attention obtain sitter and review sitter guidelines with assigned personnel  7. Initiate Psychosocial CNS and Spiritual Care consult, as indicated  Outcome: Progressing     Problem: Pain  Goal: Verbalizes/displays adequate comfort level or baseline comfort level  Outcome: Progressing     Problem: Chronic Conditions and Co-morbidities  Goal: Patient's chronic conditions and co-morbidity symptoms are monitored and maintained or improved  Outcome: Progressing     
  Problem: Safety - Adult  Goal: Free from fall injury  Outcome: Progressing     Problem: Discharge Planning  Goal: Discharge to home or other facility with appropriate resources  Outcome: Progressing     Problem: Skin/Tissue Integrity  Goal: Absence of new skin breakdown  Description: 1.  Monitor for areas of redness and/or skin breakdown  2.  Assess vascular access sites hourly  3.  Every 4-6 hours minimum:  Change oxygen saturation probe site  4.  Every 4-6 hours:  If on nasal continuous positive airway pressure, respiratory therapy assess nares and determine need for appliance change or resting period.  Outcome: Progressing     Problem: Confusion  Goal: Confusion, delirium, dementia, or psychosis is improved or at baseline  Description: INTERVENTIONS:  1. Assess for possible contributors to thought disturbance, including medications, impaired vision or hearing, underlying metabolic abnormalities, dehydration, psychiatric diagnoses, and notify attending LIP  2. [place] high risk fall precautions, as indicated  3. Provide frequent short contacts to provide reality reorientation, refocusing and direction  4. Decrease environmental stimuli, including noise as appropriate  5. Monitor and intervene to maintain adequate nutrition, hydration, elimination, sleep and activity  6. If unable to ensure safety without constant attention obtain sitter and review sitter guidelines with assigned personnel  7. Initiate Psychosocial CNS and Spiritual Care consult, as indicated  Outcome: Progressing     
  Problem: Safety - Adult  Goal: Free from fall injury  Outcome: Progressing     Problem: Discharge Planning  Goal: Discharge to home or other facility with appropriate resources  Outcome: Progressing     Problem: Skin/Tissue Integrity  Goal: Absence of new skin breakdown  Description: 1.  Monitor for areas of redness and/or skin breakdown  2.  Assess vascular access sites hourly  3.  Every 4-6 hours minimum:  Change oxygen saturation probe site  4.  Every 4-6 hours:  If on nasal continuous positive airway pressure, respiratory therapy assess nares and determine need for appliance change or resting period.  Outcome: Progressing     Problem: Confusion  Goal: Confusion, delirium, dementia, or psychosis is improved or at baseline  Description: INTERVENTIONS:  1. Assess for possible contributors to thought disturbance, including medications, impaired vision or hearing, underlying metabolic abnormalities, dehydration, psychiatric diagnoses, and notify attending LIP  2. [place] high risk fall precautions, as indicated  3. Provide frequent short contacts to provide reality reorientation, refocusing and direction  4. Decrease environmental stimuli, including noise as appropriate  5. Monitor and intervene to maintain adequate nutrition, hydration, elimination, sleep and activity  6. If unable to ensure safety without constant attention obtain sitter and review sitter guidelines with assigned personnel  7. Initiate Psychosocial CNS and Spiritual Care consult, as indicated  Outcome: Progressing     Problem: Pain  Goal: Verbalizes/displays adequate comfort level or baseline comfort level  Outcome: Progressing     Problem: Chronic Conditions and Co-morbidities  Goal: Patient's chronic conditions and co-morbidity symptoms are monitored and maintained or improved  Outcome: Progressing     Problem: Nutrition Deficit:  Goal: Optimize nutritional status  Outcome: Progressing     
  Problem: Safety - Adult  Goal: Free from fall injury  Outcome: Progressing     Problem: Discharge Planning  Goal: Discharge to home or other facility with appropriate resources  Outcome: Progressing     Problem: Skin/Tissue Integrity  Goal: Absence of new skin breakdown  Description: 1.  Monitor for areas of redness and/or skin breakdown  2.  Assess vascular access sites hourly  3.  Every 4-6 hours minimum:  Change oxygen saturation probe site  4.  Every 4-6 hours:  If on nasal continuous positive airway pressure, respiratory therapy assess nares and determine need for appliance change or resting period.  Outcome: Progressing     Problem: Confusion  Goal: Confusion, delirium, dementia, or psychosis is improved or at baseline  Description: INTERVENTIONS:  1. Assess for possible contributors to thought disturbance, including medications, impaired vision or hearing, underlying metabolic abnormalities, dehydration, psychiatric diagnoses, and notify attending LIP  2. [place] high risk fall precautions, as indicated  3. Provide frequent short contacts to provide reality reorientation, refocusing and direction  4. Decrease environmental stimuli, including noise as appropriate  5. Monitor and intervene to maintain adequate nutrition, hydration, elimination, sleep and activity  6. If unable to ensure safety without constant attention obtain sitter and review sitter guidelines with assigned personnel  7. Initiate Psychosocial CNS and Spiritual Care consult, as indicated  Outcome: Progressing     Problem: Pain  Goal: Verbalizes/displays adequate comfort level or baseline comfort level  Outcome: Progressing     Problem: Chronic Conditions and Co-morbidities  Goal: Patient's chronic conditions and co-morbidity symptoms are monitored and maintained or improved  Outcome: Progressing     
  Problem: Safety - Adult  Goal: Free from fall injury  Outcome: Progressing     Problem: Discharge Planning  Goal: Discharge to home or other facility with appropriate resources  Outcome: Progressing     Problem: Skin/Tissue Integrity  Goal: Absence of new skin breakdown  Description: 1.  Monitor for areas of redness and/or skin breakdown  2.  Assess vascular access sites hourly  3.  Every 4-6 hours minimum:  Change oxygen saturation probe site  4.  Every 4-6 hours:  If on nasal continuous positive airway pressure, respiratory therapy assess nares and determine need for appliance change or resting period.  Outcome: Progressing     Problem: Confusion  Goal: Confusion, delirium, dementia, or psychosis is improved or at baseline  Description: INTERVENTIONS:  1. Assess for possible contributors to thought disturbance, including medications, impaired vision or hearing, underlying metabolic abnormalities, dehydration, psychiatric diagnoses, and notify attending LIP  2. [place] high risk fall precautions, as indicated  3. Provide frequent short contacts to provide reality reorientation, refocusing and direction  4. Decrease environmental stimuli, including noise as appropriate  5. Monitor and intervene to maintain adequate nutrition, hydration, elimination, sleep and activity  6. If unable to ensure safety without constant attention obtain sitter and review sitter guidelines with assigned personnel  7. Initiate Psychosocial CNS and Spiritual Care consult, as indicated  Outcome: Progressing     Problem: Pain  Goal: Verbalizes/displays adequate comfort level or baseline comfort level  Outcome: Progressing     Problem: Chronic Conditions and Co-morbidities  Goal: Patient's chronic conditions and co-morbidity symptoms are monitored and maintained or improved  Outcome: Progressing     Problem: Nutrition Deficit:  Goal: Optimize nutritional status  Outcome: Progressing     
  Problem: Safety - Adult  Goal: Free from fall injury  Outcome: Progressing     Problem: Discharge Planning  Goal: Discharge to home or other facility with appropriate resources  Outcome: Progressing     Problem: Skin/Tissue Integrity  Goal: Absence of new skin breakdown  Description: 1.  Monitor for areas of redness and/or skin breakdown  2.  Assess vascular access sites hourly  3.  Every 4-6 hours minimum:  Change oxygen saturation probe site  4.  Every 4-6 hours:  If on nasal continuous positive airway pressure, respiratory therapy assess nares and determine need for appliance change or resting period.  Outcome: Progressing     Problem: Confusion  Goal: Confusion, delirium, dementia, or psychosis is improved or at baseline  Description: INTERVENTIONS:  1. Assess for possible contributors to thought disturbance, including medications, impaired vision or hearing, underlying metabolic abnormalities, dehydration, psychiatric diagnoses, and notify attending LIP  2. [place] high risk fall precautions, as indicated  3. Provide frequent short contacts to provide reality reorientation, refocusing and direction  4. Decrease environmental stimuli, including noise as appropriate  5. Monitor and intervene to maintain adequate nutrition, hydration, elimination, sleep and activity  6. If unable to ensure safety without constant attention obtain sitter and review sitter guidelines with assigned personnel  7. Initiate Psychosocial CNS and Spiritual Care consult, as indicated  Outcome: Progressing     Problem: Pain  Goal: Verbalizes/displays adequate comfort level or baseline comfort level  Outcome: Progressing     Problem: Chronic Conditions and Co-morbidities  Goal: Patient's chronic conditions and co-morbidity symptoms are monitored and maintained or improved  Outcome: Progressing     Problem: Nutrition Deficit:  Goal: Optimize nutritional status  Outcome: Progressing     
  Problem: Safety - Adult  Goal: Free from fall injury  Outcome: Progressing     Problem: Discharge Planning  Goal: Discharge to home or other facility with appropriate resources  Outcome: Progressing     Problem: Skin/Tissue Integrity  Goal: Absence of new skin breakdown  Description: 1.  Monitor for areas of redness and/or skin breakdown  2.  Assess vascular access sites hourly  3.  Every 4-6 hours minimum:  Change oxygen saturation probe site  4.  Every 4-6 hours:  If on nasal continuous positive airway pressure, respiratory therapy assess nares and determine need for appliance change or resting period.  Outcome: Progressing     Problem: Confusion  Goal: Confusion, delirium, dementia, or psychosis is improved or at baseline  Description: INTERVENTIONS:  1. Assess for possible contributors to thought disturbance, including medications, impaired vision or hearing, underlying metabolic abnormalities, dehydration, psychiatric diagnoses, and notify attending LIP  2. [place] high risk fall precautions, as indicated  3. Provide frequent short contacts to provide reality reorientation, refocusing and direction  4. Decrease environmental stimuli, including noise as appropriate  5. Monitor and intervene to maintain adequate nutrition, hydration, elimination, sleep and activity  6. If unable to ensure safety without constant attention obtain sitter and review sitter guidelines with assigned personnel  7. Initiate Psychosocial CNS and Spiritual Care consult, as indicated  Outcome: Progressing     Problem: Pain  Goal: Verbalizes/displays adequate comfort level or baseline comfort level  Outcome: Progressing     Problem: Chronic Conditions and Co-morbidities  Goal: Patient's chronic conditions and co-morbidity symptoms are monitored and maintained or improved  Outcome: Progressing     Problem: Nutrition Deficit:  Goal: Optimize nutritional status  Outcome: Progressing     
  Problem: Safety - Adult  Goal: Free from fall injury  Outcome: Progressing     Problem: Discharge Planning  Goal: Discharge to home or other facility with appropriate resources  Outcome: Progressing  Flowsheets (Taken 4/20/2024 0800 by Faye Thomas RN)  Discharge to home or other facility with appropriate resources:   Identify barriers to discharge with patient and caregiver   Arrange for needed discharge resources and transportation as appropriate   Identify discharge learning needs (meds, wound care, etc)   Refer to discharge planning if patient needs post-hospital services based on physician order or complex needs related to functional status, cognitive ability or social support system     Problem: Skin/Tissue Integrity  Goal: Absence of new skin breakdown  Description: 1.  Monitor for areas of redness and/or skin breakdown  2.  Assess vascular access sites hourly  3.  Every 4-6 hours minimum:  Change oxygen saturation probe site  4.  Every 4-6 hours:  If on nasal continuous positive airway pressure, respiratory therapy assess nares and determine need for appliance change or resting period.  Outcome: Progressing     Problem: Confusion  Goal: Confusion, delirium, dementia, or psychosis is improved or at baseline  Description: INTERVENTIONS:  1. Assess for possible contributors to thought disturbance, including medications, impaired vision or hearing, underlying metabolic abnormalities, dehydration, psychiatric diagnoses, and notify attending LIP  2. [place] high risk fall precautions, as indicated  3. Provide frequent short contacts to provide reality reorientation, refocusing and direction  4. Decrease environmental stimuli, including noise as appropriate  5. Monitor and intervene to maintain adequate nutrition, hydration, elimination, sleep and activity  6. If unable to ensure safety without constant attention obtain sitter and review sitter guidelines with assigned personnel  7. Initiate Psychosocial CNS 
  Problem: Safety - Adult  Goal: Free from fall injury  Outcome: Progressing     Problem: Discharge Planning  Goal: Discharge to home or other facility with appropriate resources  Recent Flowsheet Documentation  Taken 4/7/2024 0730 by Nima Matthews RN  Discharge to home or other facility with appropriate resources: Identify barriers to discharge with patient and caregiver     Problem: Skin/Tissue Integrity  Goal: Absence of new skin breakdown  Description: 1.  Monitor for areas of redness and/or skin breakdown  2.  Assess vascular access sites hourly  3.  Every 4-6 hours minimum:  Change oxygen saturation probe site  4.  Every 4-6 hours:  If on nasal continuous positive airway pressure, respiratory therapy assess nares and determine need for appliance change or resting period.  Outcome: Progressing     Problem: Confusion  Goal: Confusion, delirium, dementia, or psychosis is improved or at baseline  Description: INTERVENTIONS:  1. Assess for possible contributors to thought disturbance, including medications, impaired vision or hearing, underlying metabolic abnormalities, dehydration, psychiatric diagnoses, and notify attending LIP  2. [place] high risk fall precautions, as indicated  3. Provide frequent short contacts to provide reality reorientation, refocusing and direction  4. Decrease environmental stimuli, including noise as appropriate  5. Monitor and intervene to maintain adequate nutrition, hydration, elimination, sleep and activity  6. If unable to ensure safety without constant attention obtain sitter and review sitter guidelines with assigned personnel  7. Initiate Psychosocial CNS and Spiritual Care consult, as indicated  Recent Flowsheet Documentation  Taken 4/7/2024 0730 by Nima Matthews RN  Effect of thought disturbance (confusion, delirium, dementia, or psychosis) are managed with adequate functional status: Provide frequent short contacts to provide reality reorientation, 
  Problem: Safety - Adult  Goal: Free from fall injury  Outcome: Progressing     Problem: Skin/Tissue Integrity  Goal: Absence of new skin breakdown  Description: 1.  Monitor for areas of redness and/or skin breakdown  2.  Assess vascular access sites hourly  3.  Every 4-6 hours minimum:  Change oxygen saturation probe site  4.  Every 4-6 hours:  If on nasal continuous positive airway pressure, respiratory therapy assess nares and determine need for appliance change or resting period.  Outcome: Progressing       Problem: Pain  Goal: Verbalizes/displays adequate comfort level or baseline comfort level  Outcome: Progressing     Problem: Chronic Conditions and Co-morbidities  Goal: Patient's chronic conditions and co-morbidity symptoms are monitored and maintained or improved  Outcome: Progressing     Problem: Nutrition Deficit:  Goal: Optimize nutritional status  Outcome: Progressing     
  Problem: Safety - Adult  Goal: Free from fall injury  Outcome: Progressing     Problem: Skin/Tissue Integrity  Goal: Absence of new skin breakdown  Description: 1.  Monitor for areas of redness and/or skin breakdown  2.  Assess vascular access sites hourly  3.  Every 4-6 hours minimum:  Change oxygen saturation probe site  4.  Every 4-6 hours:  If on nasal continuous positive airway pressure, respiratory therapy assess nares and determine need for appliance change or resting period.  Outcome: Progressing     Problem: Chronic Conditions and Co-morbidities  Goal: Patient's chronic conditions and co-morbidity symptoms are monitored and maintained or improved  Outcome: Progressing     
  Problem: Safety - Adult  Goal: Free from fall injury  Outcome: Progressing  Flowsheets (Taken 4/11/2024 0143)  Free From Fall Injury: Instruct family/caregiver on patient safety     Problem: Discharge Planning  Goal: Discharge to home or other facility with appropriate resources  Outcome: Progressing     Problem: Skin/Tissue Integrity  Goal: Absence of new skin breakdown  Description: 1.  Monitor for areas of redness and/or skin breakdown  2.  Assess vascular access sites hourly  3.  Every 4-6 hours minimum:  Change oxygen saturation probe site  4.  Every 4-6 hours:  If on nasal continuous positive airway pressure, respiratory therapy assess nares and determine need for appliance change or resting period.  Outcome: Progressing     
  Problem: Safety - Adult  Goal: Free from fall injury  Outcome: Progressing  Flowsheets (Taken 4/6/2024 1911 by Niurka Paul RN)  Free From Fall Injury: Instruct family/caregiver on patient safety     Problem: Discharge Planning  Goal: Discharge to home or other facility with appropriate resources  Outcome: Progressing  Flowsheets (Taken 4/9/2024 1823)  Discharge to home or other facility with appropriate resources:   Identify barriers to discharge with patient and caregiver   Arrange for needed discharge resources and transportation as appropriate   Identify discharge learning needs (meds, wound care, etc)     Problem: Skin/Tissue Integrity  Goal: Absence of new skin breakdown  Description: 1.  Monitor for areas of redness and/or skin breakdown  2.  Assess vascular access sites hourly  3.  Every 4-6 hours minimum:  Change oxygen saturation probe site  4.  Every 4-6 hours:  If on nasal continuous positive airway pressure, respiratory therapy assess nares and determine need for appliance change or resting period.  Outcome: Progressing     Problem: Confusion  Goal: Confusion, delirium, dementia, or psychosis is improved or at baseline  Description: INTERVENTIONS:  1. Assess for possible contributors to thought disturbance, including medications, impaired vision or hearing, underlying metabolic abnormalities, dehydration, psychiatric diagnoses, and notify attending LIP  2. [place] high risk fall precautions, as indicated  3. Provide frequent short contacts to provide reality reorientation, refocusing and direction  4. Decrease environmental stimuli, including noise as appropriate  5. Monitor and intervene to maintain adequate nutrition, hydration, elimination, sleep and activity  6. If unable to ensure safety without constant attention obtain sitter and review sitter guidelines with assigned personnel  7. Initiate Psychosocial CNS and Spiritual Care consult, as indicated  Outcome: Progressing  Flowsheets (Taken 
1900: Bedside and Verbal shift change report given to MORGAN Chou (oncoming nurse) by MORGAN Lopez (offgoing nurse). Report included the following information Nurse Handoff Report and Index.     End of Shift Note    Bedside shift change report given to Merlin, RN (oncoming nurse) by NILE FITZGERALD RN (offgoing nurse).  Report included the following information SBAR    Shift worked:  [id number]     Shift summary and any significant changes:          Concerns for physician to address:       Zone phone for oncoming shift:          Activity:     Number times ambulated in hallways past shift: 0  Number of times OOB to chair past shift: 0    Cardiac:   Cardiac Monitoring: Yes           Access:  Current line(s): PIV     Genitourinary:   Urinary status: voiding, incontinent, and external catheter    Respiratory:      Chronic home O2 use?: N/A  Incentive spirometer at bedside: YES       GI:     Current diet:  ADULT DIET; Easy to Chew; 4 carb choices (60 gm/meal); Low Fat/Low Chol/High Fiber/2 gm Na  ADULT ORAL NUTRITION SUPPLEMENT; Breakfast; Clear Liquid Oral Supplement  ADULT ORAL NUTRITION SUPPLEMENT; Lunch; Frozen Oral Supplement  DIET ONE TIME MESSAGE;  Passing flatus: YES  Tolerating current diet: YES       Pain Management:   Patient states pain is manageable on current regimen: N/A    Skin:     Interventions: float heels and internal/external urinary devices    Patient Safety:  Fall Score:    Interventions: bed/chair alarm and gripper socks       Length of Stay:  Expected LOS: 23  Actual LOS: 22      NILE FITZGERALD RN                            Problem: Safety - Adult  Goal: Free from fall injury  Outcome: Progressing     Problem: Discharge Planning  Goal: Discharge to home or other facility with appropriate resources  Outcome: Progressing     Problem: Skin/Tissue Integrity  Goal: Absence of new skin breakdown  Description: 1.  Monitor for areas of redness and/or skin breakdown  2.  Assess vascular access sites hourly  3.  
2149: TRANSFER - OUT REPORT:    Verbal report given to MORGAN Petty on Lillie Claire  being transferred to [facility] for routine progression of patient care       Report consisted of patient's Situation, Background, Assessment and   Recommendations(SBAR).     Information from the following report(s) Nurse Handoff Report was reviewed with the receiving nurse.           Lines:   Peripheral IV 04/14/24 Distal;Right;Anterior Cephalic (Active)   Site Assessment Clean, dry & intact 04/25/24 2007   Line Status Normal saline locked 04/25/24 2007   Line Care Connections checked and tightened 04/25/24 2007   Phlebitis Assessment No symptoms 04/25/24 2007   Infiltration Assessment 0 04/25/24 2007   Alcohol Cap Used Yes 04/25/24 2007   Dressing Status Clean, dry & intact 04/25/24 2007   Dressing Type Transparent w/CHG gel 04/25/24 2007        Opportunity for questions and clarification was provided.      Patient transported with:  Monitor and O2 @ 2lpm    2225: Patient left the unit         Problem: Confusion  Goal: Confusion, delirium, dementia, or psychosis is improved or at baseline  Description: INTERVENTIONS:  1. Assess for possible contributors to thought disturbance, including medications, impaired vision or hearing, underlying metabolic abnormalities, dehydration, psychiatric diagnoses, and notify attending LIP  2. [place] high risk fall precautions, as indicated  3. Provide frequent short contacts to provide reality reorientation, refocusing and direction  4. Decrease environmental stimuli, including noise as appropriate  5. Monitor and intervene to maintain adequate nutrition, hydration, elimination, sleep and activity  6. If unable to ensure safety without constant attention obtain sitter and review sitter guidelines with assigned personnel  7. Initiate Psychosocial CNS and Spiritual Care consult, as indicated  4/25/2024 2055 by Effie Falcon, RN  Outcome: Not Progressing  4/25/2024 0921 by Lang, Merlin, RN  Outcome: Not 
Dr. Norman notified of no IV access pending discharge and will change Lasix to PO form.  Problem: Safety - Adult  Goal: Free from fall injury  Outcome: Progressing     Problem: Discharge Planning  Goal: Discharge to home or other facility with appropriate resources  Outcome: Progressing  Flowsheets  Taken 4/27/2024 0945 by Feli Bergman, MORGAN  Discharge to home or other facility with appropriate resources: Identify barriers to discharge with patient and caregiver  Taken 4/26/2024 2040 by Leonardo Tariq, RN  Discharge to home or other facility with appropriate resources: Identify barriers to discharge with patient and caregiver     
Patient s alert and oriented to self. No sign of pain.  Continues on o2. No SOB. Turned and re-positioned Q 2 hrs. K repleted.  MD aware of po po intake.           Problem: Safety - Adult  Goal: Free from fall injury  Outcome: Progressing     Problem: Skin/Tissue Integrity  Goal: Absence of new skin breakdown  Description: 1.  Monitor for areas of redness and/or skin breakdown  2.  Assess vascular access sites hourly  3.  Every 4-6 hours minimum:  Change oxygen saturation probe site  4.  Every 4-6 hours:  If on nasal continuous positive airway pressure, respiratory therapy assess nares and determine need for appliance change or resting period.  Outcome: Progressing     Problem: Pain  Goal: Verbalizes/displays adequate comfort level or baseline comfort level  Outcome: Progressing     
Problem: Physical Therapy - Adult  Goal: By Discharge: Performs mobility at highest level of function for planned discharge setting.  See evaluation for individualized goals.  Description: FUNCTIONAL STATUS PRIOR TO ADMISSION: Patient was modified independent using a rolling walker for functional mobility.  Patient is a poor historian overall.    HOME SUPPORT PRIOR TO ADMISSION: The patient lived with brother and his wife per patient.  She is unable to verbalize what they assist her with.    Physical Therapy Goals  Initiated 4/8/2024  1.  Patient will move from supine to sit and sit to supine in bed with modified independence within 7 day(s).    2.  Patient will perform sit to stand with supervision/set-up within 7 day(s).  3.  Patient will transfer from bed to chair and chair to bed with supervision/set-up using the least restrictive device within 7 day(s).  4.  Patient will ambulate with supervision/set-up for 50 feet with the least restrictive device within 7 day(s).   5.  Patient will ascend/descend 4 stairs with 1 handrail(s) with contact guard assist within 7 day(s).  Outcome: Not Progressing   PHYSICAL THERAPY TREATMENT    Patient: Lillie Claire (68 y.o. female)  Date: 4/12/2024  Diagnosis: Sepsis (HCC) [A41.9] Sepsis (HCC)  Procedure(s) (LRB):  ESOPHAGOGASTRODUODENOSCOPY (N/A) 2 Days Post-Op  Precautions: Fall Risk                      ASSESSMENT:  Patient continues to benefit from skilled PT services and is not progressing towards goals. Pt received high fowlers in bed, agreeable to participate in therapy. Pt continues to demonstrate confusion and decreased orientation. Requires max encouragement for participation and consistent redirection to assigned tasks. Transferred to EOB and demonstrated fair sitting balance. Cued for STS transferred however pt became increasingly agitated and impulsively returned to supine. Pt continues to perform below baseline and would benefit from HHPT with 24/7 care or SNF at 
Pt resting in bed, eyes closed, arouses easliy with stimulation, poor po intake, will drink more than she eats, NADnoted. Plan is for hospice admission.   
Speech LAnguage Pathology EVALUATION    Patient: Lillie Claire (68 y.o. female)  Date: 4/5/2024  Primary Diagnosis: Sepsis (HCC) [A41.9]       Precautions: aspiration precautions                     ASSESSMENT :  Patient presents with h/o CVA, bipolar disorder, and dementia negatively impacting willingness to accept PO trials with SLP. Patient slumped in bed upon SLP arrival and requesting to lay back despite education for need to sit upright for PO intake. Unable to complete oral mechanism d/t patient not following commands; however, patient noted with some missing dentition (reports she has partial upper/lower dentures though not present in hospital). Patient agreeable to sips of water. Patient taking rapid, consecutive sips thin via straw initially. As session progressed, patient agreeable to sitting in full upright position with assistance repositioning from RN. Patient accepted 1 bite of saltine cracker. Patient tolerated with mildly prolonged but efficient mastication. No oral residue noted. RN present to give PO medications whole. Patient accepted multiple pills at once with liquid wash. With another presentation of thin via straw, patient again takes rapid/consecutive sips. Delayed cough following rapid, consecutive sips only.     Per chart review, concern for aspiration with CXR 4/3: \"right lower lobe mass is essentially invisible on portable chest radiography. The lungs are radiographically clear. The central airways are Patent.\" Given limited PO intake and appeared gross tolerance of PO trials at bedside, recommend cautious diet advancement to regular diet with thin liquids. However, question patient's willingness to accept PO consistently despite diet advancement. Additionally, patient may benefit from instrumental to further evaluate swallow function but question patient's willingness to participate. Palliative care continuing to follow to assist with establishing GOC. SLP will continue to follow. 
Speech LAnguage Pathology TREATMENT/DISCHARGE    Patient: Lillie Claire (68 y.o. female)  Date: 4/8/2024  Primary Diagnosis: Sepsis (HCC) [A41.9]       Precautions: aspiration precautions, Fall Risk                  ASSESSMENT :  Patient awake, reclined in bed with untouched lunch tray at bedside (soft & bite sized carrots, mashed potatoes, and pot roast with gravy). SLP positioned patient upright and was agreeable to limited PO trials of mashed potatoes, tea, and pot roast. Prolonged mastication of pot roast requiring liquid wash X2 in order to eventually clear oral cavity (suspect poor and missing dentition negatively impacting efficiency of mastication). No immediate overt clinical s/s aspiration noted across PO trials. Delayed, productive cough following thin via consecutive sips thin via cup. CXR 4/8- \"Bibasilar airspace opacities increased in interval. Mild pulmonary  edema.\" Patient continues to present with limited PO appetite and waxing/waning willingness to accept PO. Suspect patient at current functional baseline. No further acute SLP services warranted at this time. SLP will sign off.     Patient will be discharged from skilled speech-language pathology services at this time.     PLAN :  Recommendations and Planned Interventions:  Diet: Soft and bite sized and thin liquids  --Medications as tolerated (may consider larger pills, whole with puree)  --Upright all PO intake   --Alternate solids & liquids   --Slow rate  --Oral hygiene 2-3x/day  --1:1 supervision/assistance       Tips for assisting people with dementia at mealtimes  Environmental modifications  · Ensure mealtimes are relaxed and unhurried; eat together to encourage participation  · Decrease distractions (e.g. turn off the TV, provide only the utensil needed the food item)  · Use smaller plates with color contrast between plate and food to increase attention  Feeding techniques  · Try offering smaller meals more frequently with high-calorie 
Care consult, as indicated  4/12/2024 2208 by Deysi Reddy RN  Outcome: Progressing  4/12/2024 1328 by Kirsten Amor RN  Outcome: Not Progressing  4/12/2024 1213 by Nina Herrera RN  Outcome: Not Progressing  Flowsheets (Taken 4/12/2024 0725 by Kirsten Amor RN)  Effect of thought disturbance (confusion, delirium, dementia, or psychosis) are managed with adequate functional status:   Assess for contributors to thought disturbance, including medications, impaired vision or hearing, underlying metabolic abnormalities, dehydration, psychiatric diagnoses, notify [facility]   [place] high risk fall precautions, as indicated     Problem: Pain  Goal: Verbalizes/displays adequate comfort level or baseline comfort level  4/12/2024 2208 by Deysi Reddy RN  Outcome: Progressing  4/12/2024 1328 by Kirsten Amor RN  Outcome: Progressing  4/12/2024 1213 by Nina Herrera RN  Outcome: Progressing     Problem: Chronic Conditions and Co-morbidities  Goal: Patient's chronic conditions and co-morbidity symptoms are monitored and maintained or improved  4/12/2024 2208 by Deysi Reddy RN  Outcome: Progressing  4/12/2024 1328 by Kirsten Amor RN  Outcome: Progressing  4/12/2024 1213 by Nina Herrera RN  Outcome: Progressing  Flowsheets (Taken 4/12/2024 0725 by Kirsten Amor RN)  Care Plan - Patient's Chronic Conditions and Co-Morbidity Symptoms are Monitored and Maintained or Improved: Monitor and assess patient's chronic conditions and comorbid symptoms for stability, deterioration, or improvement     Problem: Discharge Planning  Goal: Discharge to home or other facility with appropriate resources  4/12/2024 2208 by Deysi Reddy RN  Outcome: Progressing  4/12/2024 1328 by Kirsten Amor RN  Outcome: Progressing  4/12/2024 1213 by Nina Herrera RN  Outcome: Not Progressing     Problem: Confusion  Goal: Confusion, delirium, dementia, or psychosis is improved or at baseline  Description: 
Verbalizes/displays adequate comfort level or baseline comfort level  4/23/2024 1950 by Deysi Reddy RN  Outcome: Progressing  4/23/2024 1030 by Tata Lemus RN  Outcome: Progressing     Problem: Chronic Conditions and Co-morbidities  Goal: Patient's chronic conditions and co-morbidity symptoms are monitored and maintained or improved  4/23/2024 1950 by Deysi Reddy RN  Outcome: Progressing  4/23/2024 1030 by Tata Lemus, RN  Outcome: Progressing     Problem: Nutrition Deficit:  Goal: Optimize nutritional status  4/23/2024 1950 by Deysi Reddy RN  Outcome: Progressing  4/23/2024 1030 by Tata Lemus, RN  Outcome: Progressing     
evaluation for individualized goals.  Description: FUNCTIONAL STATUS PRIOR TO ADMISSION: Patient was modified independent using a rolling walker for functional mobility.  Patient is a poor historian overall.    HOME SUPPORT PRIOR TO ADMISSION: The patient lived with brother and his wife per patient.  She is unable to verbalize what they assist her with.    Physical Therapy Goals  Initiated 4/8/2024  1.  Patient will move from supine to sit and sit to supine in bed with modified independence within 7 day(s).    2.  Patient will perform sit to stand with supervision/set-up within 7 day(s).  3.  Patient will transfer from bed to chair and chair to bed with supervision/set-up using the least restrictive device within 7 day(s).  4.  Patient will ambulate with supervision/set-up for 50 feet with the least restrictive device within 7 day(s).   5.  Patient will ascend/descend 4 stairs with 1 handrail(s) with contact guard assist within 7 day(s).  4/8/2024 1249 by Kirsten Swenson, PT  Outcome: Progressing     
hydration, elimination, sleep and activity  6. If unable to ensure safety without constant attention obtain sitter and review sitter guidelines with assigned personnel  7. Initiate Psychosocial CNS and Spiritual Care consult, as indicated  4/10/2024 0856 by Jodie Finn RN  Outcome: Progressing  4/9/2024 2349 by Quiana Wilkins RN  Outcome: Progressing     Problem: Pain  Goal: Verbalizes/displays adequate comfort level or baseline comfort level  4/10/2024 0856 by Jodie Finn RN  Outcome: Progressing  Flowsheets (Taken 4/10/2024 0722)  Verbalizes/displays adequate comfort level or baseline comfort level: Encourage patient to monitor pain and request assistance  4/9/2024 2349 by Quiana Wilkins RN  Outcome: Progressing     Problem: Chronic Conditions and Co-morbidities  Goal: Patient's chronic conditions and co-morbidity symptoms are monitored and maintained or improved  Outcome: Progressing  Flowsheets  Taken 4/10/2024 0754 by Jodie Finn RN  Care Plan - Patient's Chronic Conditions and Co-Morbidity Symptoms are Monitored and Maintained or Improved: Monitor and assess patient's chronic conditions and comorbid symptoms for stability, deterioration, or improvement  Taken 4/9/2024 1911 by Quiana Wilkins RN  Care Plan - Patient's Chronic Conditions and Co-Morbidity Symptoms are Monitored and Maintained or Improved: Monitor and assess patient's chronic conditions and comorbid symptoms for stability, deterioration, or improvement     
psychosis) are managed with adequate functional status: Assess for contributors to thought disturbance, including medications, impaired vision or hearing, underlying metabolic abnormalities, dehydration, psychiatric diagnoses, notify LIP  4/4/2024 1539 by Lang, Merlin, RN  Outcome: Progressing     Problem: Pain  Goal: Verbalizes/displays adequate comfort level or baseline comfort level  4/4/2024 2223 by Jessica Solano RN  Outcome: Progressing  Flowsheets (Taken 4/4/2024 2000)  Verbalizes/displays adequate comfort level or baseline comfort level: Assess pain using appropriate pain scale  4/4/2024 1539 by Lang, Merlin, RN  Outcome: Progressing     Problem: Chronic Conditions and Co-morbidities  Goal: Patient's chronic conditions and co-morbidity symptoms are monitored and maintained or improved  4/4/2024 2223 by Jessica Solano, RN  Outcome: Progressing  4/4/2024 1539 by Lang, Merlin, RN  Outcome: Progressing     
risk fall precautions, as indicated  3. Provide frequent short contacts to provide reality reorientation, refocusing and direction  4. Decrease environmental stimuli, including noise as appropriate  5. Monitor and intervene to maintain adequate nutrition, hydration, elimination, sleep and activity  6. If unable to ensure safety without constant attention obtain sitter and review sitter guidelines with assigned personnel  7. Initiate Psychosocial CNS and Spiritual Care consult, as indicated  4/9/2024 2349 by Quiana Wilkins RN  Outcome: Progressing  4/9/2024 1823 by Daron Donald RN  Outcome: Progressing  Flowsheets (Taken 4/7/2024 0730 by Nima Matthews RN)  Effect of thought disturbance (confusion, delirium, dementia, or psychosis) are managed with adequate functional status: Provide frequent short contacts to provide reality reorientation, refocusing and direction     Problem: Pain  Goal: Verbalizes/displays adequate comfort level or baseline comfort level  4/9/2024 2349 by Quiana Wilkins RN  Outcome: Progressing  4/9/2024 1823 by Daron Donald RN  Outcome: Progressing  Flowsheets (Taken 4/9/2024 1823)  Verbalizes/displays adequate comfort level or baseline comfort level:   Assess pain using appropriate pain scale   Encourage patient to monitor pain and request assistance   Administer analgesics based on type and severity of pain and evaluate response     Problem: Chronic Conditions and Co-morbidities  Goal: Patient's chronic conditions and co-morbidity symptoms are monitored and maintained or improved  Recent Flowsheet Documentation  Taken 4/9/2024 1911 by Quiana Wilkins RN  Care Plan - Patient's Chronic Conditions and Co-Morbidity Symptoms are Monitored and Maintained or Improved: Monitor and assess patient's chronic conditions and comorbid symptoms for stability, deterioration, or improvement  4/9/2024 1823 by Daron Donald RN  Outcome: Progressing  Flowsheets (Taken 4/9/2024 
Independent Practitioner if interventions unsuccessful or patient reports new pain     Problem: Chronic Conditions and Co-morbidities  Goal: Patient's chronic conditions and co-morbidity symptoms are monitored and maintained or improved  Outcome: Progressing  Flowsheets (Taken 4/19/2024 0800)  Care Plan - Patient's Chronic Conditions and Co-Morbidity Symptoms are Monitored and Maintained or Improved:   Monitor and assess patient's chronic conditions and comorbid symptoms for stability, deterioration, or improvement   Collaborate with multidisciplinary team to address chronic and comorbid conditions and prevent exacerbation or deterioration   Update acute care plan with appropriate goals if chronic or comorbid symptoms are exacerbated and prevent overall improvement and discharge     Problem: Nutrition Deficit:  Goal: Optimize nutritional status  Outcome: Progressing     
and Confused  Orientation Level: Oriented to person  Cognition: Decreased attention/concentration, Decreased command following, and Memory loss    Dysphagia:  Oral Assessment:  Oral Motor   Labial: Other (comment) (unable to complete as patient not following commands)  Dentition: Natural;Limited  Lingual:  (unable to complete as patient not following commands)  Velum: Unable to visualize  P.O. Trials:  PO Trials  Assessment Method(s): Observation  Patient Position: upright in bed  Vocal Quality: No Impairment  Consistency Presented: Thin;Pureed  How Presented: SLP-fed/Presented;Spoon;Straw  Bolus Acceptance:  (inconsistent willingness to accept PO; poor appetite (baseline))  Bolus Formation/Control: Impaired  Type of Impairment: Mastication  Aspiration Signs/Symptoms: None  Pharyngeal Phase Characteristics: No impairment, issues, or problems            Motor Speech:         Language Comprehension and Expression:                   Neuro-Linguistics:                      Voice:       Respiratory Status/Airway:  Nasal cannula, 4LPM                           Functional Oral Intake Scale (FOIS): 5--Total oral diet with multiple consistencies but requiring special preparation or compensations    After treatment:   Patient left in no apparent distress in bed, Nursing notified, and Caregiver present    COMMUNICATION/EDUCATION:   Patient was educated regarding role of SLP and aspiration precautions.     The patient's plan of care including recommendations, planned interventions, and recommended diet changes were discussed with: Registered nurse    Patient is unable to participate in goal setting and plan of care    Thank you,  KOSTA King  Minutes: 12    
and comorbid conditions and prevent exacerbation or deterioration   Update acute care plan with appropriate goals if chronic or comorbid symptoms are exacerbated and prevent overall improvement and discharge     Problem: Nutrition Deficit:  Goal: Optimize nutritional status  Outcome: Progressing     
risk fall precautions, as indicated

## 2024-05-07 NOTE — PROGRESS NOTES
Marita Capone, NP-C                       [phone] cell              Monday-Thursday 7:30 am-4:30 pm                     Friday 7:30 am-12:00 pm         GI PROGRESS NOTE        NAME:   Lillie Claire       :    1955       MRN:    [id number]     Assessment/Plan     68 y.o. female who is seen in consultation for +FOBT. Patient presented from home with AMS and admitted 24 with sepsis secondary to pneumonia.  Diagnosed with lung mass.  Is currently on 4L oxygen, down from 6-7L and O2 sats are 92%.  Does not wear oxygen at home.  We are consulted for +FOBT.  Pt's hgb dropped to 6.8 2 days ago prompting a blood transfusion.  Hgb improved to 9.6 but today dropped back to 7.5.  Iron is 8 and iron sat is 4%.  Patient's stools are dark per nursing.  History from patient is limited.  She lives with her brother.  History from him is also limited.  She has been under the care of [facility] for the past 7 years.  He is unaware of a previous colonoscopy.  Review of prior outside records show an EGD in 2018 that was negative.  He doesn't think she was using any NSAIDs.  She has had a poor appetite for years and he thinks she may have lost some weight. Their mother had throat cancer.  Patient has hx of bipolar and \"short term memory issues\". Chart mentions cognitive impairment from previous CVA.      EGD 4/10/2024 by Dr. Alberts:  Findings:      Esophagus:   -           Normal esophagus in the proximal, mid and distal esophagus.     Stomach:   +          Normal gastric mucosa.  There was no blood in stomach. No AVMs.     Duodenum:   ++        (2) nonbleeding small AVMs in the posterior duodenal bulb s/p APC cauterization.  +          In the duodenal 2nd portion there was bleeding and irrigation revealed active bleeding from an AVM on the medial wall.  Lavaging revealed persistent bleeding.  We placed (2) regular sized EndoClips 
                                                                                                 Marita Capone, NP-C                       [phone] cell              Monday-Thursday 7:30 am-4:30 pm                     Friday 7:30 am-12:00 pm         GI PROGRESS NOTE        NAME:   Lillie Claire       :    1955       MRN:    [id number]     Assessment/Plan     68 y.o. female who is seen in consultation for +FOBT. Patient presented from home with AMS and admitted 24 with sepsis secondary to pneumonia.  Diagnosed with lung mass.  Is currently on 4L oxygen, down from 6-7L and O2 sats are 92%.  Does not wear oxygen at home.  We are consulted for +FOBT.  Pt's hgb dropped to 6.8 2 days ago prompting a blood transfusion.  Hgb improved to 9.6 but today dropped back to 7.5.  Iron is 8 and iron sat is 4%.  Patient's stools are dark per nursing.  History from patient is limited.  She lives with her brother.  History from him is also limited.  She has been under the care of [facility] for the past 7 years.  He is unaware of a previous colonoscopy.  Review of prior outside records show an EGD in 2018 that was negative.  He doesn't think she was using any NSAIDs.  She has had a poor appetite for years and he thinks she may have lost some weight. Their mother had throat cancer.  Patient has hx of bipolar and \"short term memory issues\". Chart mentions cognitive impairment from previous CVA.      EGD 4/10/2024 by Dr. Alberts:  Findings:      Esophagus:   -           Normal esophagus in the proximal, mid and distal esophagus.     Stomach:   +          Normal gastric mucosa.  There was no blood in stomach. No AVMs.     Duodenum:   ++        (2) nonbleeding small AVMs in the posterior duodenal bulb s/p APC cauterization.  +          In the duodenal 2nd portion there was bleeding and irrigation revealed active bleeding from an AVM on the medial wall.  Lavaging revealed persistent bleeding.  We placed (2) regular sized EndoClips 
                                                                                                 Marita Capone, NP-C                       [phone] cell              Monday-Thursday 7:30 am-4:30 pm                     Friday 7:30 am-12:00 pm         GI PROGRESS NOTE        NAME:   Lillie Claire       :    1955       MRN:    [id number]     Assessment/Plan     68 y.o. female who is seen in consultation for +FOBT. Patient presented from home with AMS and admitted 24 with sepsis secondary to pneumonia.  Diagnosed with lung mass.  Is currently on 4L oxygen, down from 6-7L and O2 sats are 92%.  Does not wear oxygen at home.  We are consulted for +FOBT.  Pt's hgb dropped to 6.8 2 days ago prompting a blood transfusion.  Hgb improved to 9.6 but today dropped back to 7.5.  Iron is 8 and iron sat is 4%.  Patient's stools are dark per nursing.  History from patient is limited.  She lives with her brother.  History from him is also limited.  She has been under the care of [facility] for the past 7 years.  He is unaware of a previous colonoscopy.  Review of prior outside records show an EGD in 2018 that was negative.  He doesn't think she was using any NSAIDs.  She has had a poor appetite for years and he thinks she may have lost some weight. Their mother had throat cancer.  Patient has hx of bipolar and \"short term memory issues\". Chart mentions cognitive impairment from previous CVA.      EGD 4/10/2024 by Dr. Alberts:  Findings:      Esophagus:   -           Normal esophagus in the proximal, mid and distal esophagus.     Stomach:   +          Normal gastric mucosa.  There was no blood in stomach. No AVMs.     Duodenum:   ++        (2) nonbleeding small AVMs in the posterior duodenal bulb s/p APC cauterization.  +          In the duodenal 2nd portion there was bleeding and irrigation revealed active bleeding from an AVM on the medial wall.  Lavaging revealed persistent bleeding.  We placed (2) regular sized EndoClips 
                           Pulmonary, Critical Care, and Sleep Medicine~Consult Note    Name: Lillie Claire MRN: [id number]   : 1955 Hospital: [facility]   Date: 2024 12:44 PM Admission: 2024     Impression Plan   Abnormal chest imaging--mass-like consolidation of the RLL, lymphadenopathy. Ddx includes infection, ?aspiration, malignancy.   Increased kyler asdz , mild pulmonary edema and elevated pBNP - clinically improved w/ diuresis  Acute hypoxemic respiratory failure  Fevers-- ongoing aspiration?  Severe sepsis  AMS  Moderate dementia  Hypotension--resolved w/ IVF  Hyponatremia  Anemia Abx per ID - zosyn, vanc  Agree w/ diuresis  Supp O2 for goal sats >90%  Seen by SLP - cleared  Will plan for right thoracentesis - studies ordered  Dvt ppx: lovenox  Palliative care following peripherally  Recommend repeat chest CT in 4-6 weeks after completion of antibiotics to re-assess the mass-like lesion.            Daily Progression:    24 : EGD done yesterday - bleeding AVMs. Cleared by speech for soft/bite sized thin liquid diet. ID now following. Temp 101 4/10 2300. Sats 95% on 1LPM. D/W ID team.     4/10/24 : Alert. Family at bedside. Weaned to 1LPM. Temp 100 this am. Reviewed plans w/ family.     24 : Alert. No current complaints. Weaned to 3LPM. Temp 101 overnight. Cultures NGTD. HGB 5.9, receiving PRBCs. CXR increased kyler asdz > RLL. Now on Vanc. PCT 0.52 down from 1.06 4/3. pBNP 8,000.     24 : Resting in bed. On 4LPM. Tmax 100. WBC 9.2. Cultures NGTD. GI following - possible EGD/colonoscopy later this week.     : continues to spike fevers, tmax 101.7. no leukocytosis.  She c/o feeling cold and doesn't like the food here. Minimal po intake per nurse. Some coughing w/ eating.  Increased O2 requirement now on 5L w/ o2 sat 92%    : improved mentation today. Confused. Cold and asking for blankets. Limited history able to be obtained.  SLP at bedside, patient is 
                           Pulmonary, Critical Care, and Sleep Medicine~Consult Note    Name: Lillie Claire MRN: [id number]   : 1955 Hospital: [facility]   Date: 2024 12:41 PM Admission: 2024     Impression Plan   Abnormal chest imaging--mass-like consolidation of the RLL, lymphadenopathy. Ddx includes infection, ?aspiration, malignancy.   Acute hypoxemic respiratory failure  Fevers-- ongoing aspiration?  Severe sepsis  AMS  Moderate dementia  Hypotension--resolved w/ IVF  Hyponatremia  anemia Agree w/ empiric abx--zosyn  Would re-culture if febrile again  Repeat CXR  Supp O2 for goal sats >90%  F/u BCx  SLP following--patient refused po trials yesterday  Dvt ppx: lovenox  Palliative care following  Recommend repeat chest CT in 4-6 weeks after completion of antibiotics to re-assess the mass-like lesion.     Will see prn over the weekend           Daily Progression:    : continues to spike fevers, tmax 101.7. no leukocytosis.  She c/o feeling cold and doesn't like the food here. Minimal po intake per nurse. Some coughing w/ eating.  Increased O2 requirement now on 5L w/ o2 sat 92%    : improved mentation today. Confused. Cold and asking for blankets. Limited history able to be obtained.  SLP at bedside, patient is refusing PO trials.  Febrile overnight to 102.8  Last evening again hypotensive but improved today  Hgb low on am labs but improved on recheck w/o intervention  On 2L O2    Consult for lung mass    HPI: 69 y/o F with PMH HTN, CVA, HTN, anxiety, bipolar disorder, dementia presented to the ED due to AMS x 1 week.  On arrival tachy , Febrile to 101.5. She became hypotensive which responded to IVF bolus 2500cc total.  Fever again this afternoon 102.6  Hypoxic to 87%. Placed on 2L O2.    Labs: wbc 9.5 (w/ left shift), lactic acid 3.29, Na 124, procal 1.06    CT chest/abd/pelvis 4/3: 5 cm x 4.9 cm masslike partial consolidation of the superior segment of the  right 
                           Pulmonary, Critical Care, and Sleep Medicine~Consult Note    Name: Lillie Claire MRN: [id number]   : 1955 Hospital: [facility]   Date: 4/15/2024 11:19 AM Admission: 2024     Impression Plan   Abnormal chest imaging--mass-like consolidation of the RLL, lymphadenopathy. Ddx includes infection, ?aspiration, malignancy.   Right pleural effusion transudative lymphocytic; cardiac?  Increased kyler asdz , mild pulmonary edema and elevated pBNP - clinically improved w/ diuresis  Acute hypoxemic respiratory failure  Fevers-- ongoing aspiration?  Severe sepsis  AMS  Moderate dementia  Hypotension--resolved w/ IVF  Hyponatremia  Anemia Abx per ID - zosyn, vanc  Agree w/ diuresis  Wean oxygen as long as O2 saturation greater 90%   Mobilize as tolerated   Seen by SLP - cleared  Follow pleural fluid studies  Dvt ppx: lovenox  Palliative care following peripherally  Recommend repeat chest CT in 4-6 weeks after completion of antibiotics to re-assess the mass-like lesion.            Daily Progression:    4/15/2024: Persistent low-grade fevers.  Very weak.  Thoracentesis 2024.  Lymphocytic transudative effusion.  Pleural fluid cultures no growth so far.  Right pleural effusion increased on today's film after thoracentesis    24 : Persistent fevers. Plans for thoracentesis today. Resting in bed. On 2LPM.     24 : EGD done yesterday - bleeding AVMs. Cleared by speech for soft/bite sized thin liquid diet. ID now following. Temp 101 4/10 2300. Sats 95% on 1LPM. D/W ID team.     4/10/24 : Alert. Family at bedside. Weaned to 1LPM. Temp 100 this am. Reviewed plans w/ family.     24 : Alert. No current complaints. Weaned to 3LPM. Temp 101 overnight. Cultures NGTD. HGB 5.9, receiving PRBCs. CXR increased kyler asdz > RLL. Now on Vanc. PCT 0.52 down from 1.06 4/3. pBNP 8,000.     24 : Resting in bed. On 4LPM. Tmax 100. WBC 9.2. Cultures NGTD. GI following - 
                           Pulmonary, Critical Care, and Sleep Medicine~Consult Note    Name: Lillie Claire MRN: [id number]   : 1955 Hospital: [facility]   Date: 2024 10:04 AM Admission: 2024     Impression Plan   Abnormal chest imaging--mass-like consolidation of the RLL, lymphadenopathy. Ddx includes infection, ?aspiration, malignancy.   Right pleural effusion transudative lymphocytic; cardiac?  Increased kyler asdz , mild pulmonary edema and elevated pBNP - clinically improved w/ diuresis  Acute hypoxemic respiratory failure  Fevers-- ongoing aspiration?  Severe sepsis  AMS  Moderate dementia  Hypotension--resolved w/ IVF  Hyponatremia  Anemia CT scan pending  Wean oxygen as long as O2 saturation greater 90%   Mobilize as tolerated   Seen by SLP - cleared  Follow pleural fluid studies  Dvt ppx: lovenox  Palliative care following peripherally  If pt defervesces, repeat chest CT in 4-6 weeks after completion of antibiotics to re-assess the mass-like lesion.            Daily Progression:  2024: Seen by GI.  Now on 4 L/min nasal cannula oxygen.  Patient with history of bipolar disorder and short-term memory issues.  Previous CVA.  Moderate cognitive impairment.    4/15/2024: Persistent low-grade fevers.  Very weak.  Thoracentesis 2024.  Lymphocytic transudative effusion.  Pleural fluid cultures no growth so far.  Right pleural effusion increased on today's film after thoracentesis    24 : Persistent fevers. Plans for thoracentesis today. Resting in bed. On 2LPM.     24 : EGD done yesterday - bleeding AVMs. Cleared by speech for soft/bite sized thin liquid diet. ID now following. Temp 101 4/10 2300. Sats 95% on 1LPM. D/W ID team.     4/10/24 : Alert. Family at bedside. Weaned to 1LPM. Temp 100 this am. Reviewed plans w/ family.     24 : Alert. No current complaints. Weaned to 3LPM. Temp 101 overnight. Cultures NGTD. HGB 5.9, receiving PRBCs. CXR increased 
                           Pulmonary, Critical Care, and Sleep Medicine~Consult Note    Name: Lillie Claire MRN: [id number]   : 1955 Hospital: [facility]   Date: 2024 11:57 AM Admission: 2024     Impression Plan   Abnormal chest imaging--mass-like consolidation of the RLL, lymphadenopathy. Ddx includes infection, ?aspiration, malignancy.   Acute hypoxemic respiratory failure  Severe sepsis  AMS  Moderate dementia  Hypotension--resolved w/ IVF  Hyponatremia  anemia Agree w/ empiric abx--zosyn, vanc  Supp O2 for goal sats >90%  F/u mrsa screen  F/u BCx  SLP following--patient refused po trials today  Dvt ppx: lovenox  Recommend repeat chest CT in 4-6 weeks after completion of antibiotics to re-assess the mass-like lesion.            Daily Progression:    : improved mentation today. Confused. Cold and asking for blankets. Limited history able to be obtained.  SLP at bedside, patient is refusing PO trials.  Febrile overnight to 102.8  Last evening again hypotensive but improved today  Hgb low on am labs but improved on recheck w/o intervention  On 2L O2    Consult for lung mass    HPI: 69 y/o F with PMH HTN, CVA, HTN, anxiety, bipolar disorder, dementia presented to the ED due to AMS x 1 week.  On arrival tachy , Febrile to 101.5. She became hypotensive which responded to IVF bolus 2500cc total.  Fever again this afternoon 102.6  Hypoxic to 87%. Placed on 2L O2.    Labs: wbc 9.5 (w/ left shift), lactic acid 3.29, Na 124, procal 1.06    CT chest/abd/pelvis 4/3: 5 cm x 4.9 cm masslike partial consolidation of the superior segment of the  right lower lobe. Right paratracheal, subcarinal and right hilar lymphadenopathy    ROS: unable to obtain any history from the patient. History obtained from nurse and chart review.    Social hx: unable to obtain    I have reviewed the labs and previous day’s notes.    Review of systems not obtained due to patient factors.  Past Medical 
                           Pulmonary, Critical Care, and Sleep Medicine~Consult Note    Name: Lillie Claire MRN: [id number]   : 1955 Hospital: [facility]   Date: 2024 8:50 AM Admission: 2024     Impression Plan   Abnormal chest imaging--mass-like consolidation of the RLL, lymphadenopathy. Ddx includes infection, ?aspiration, malignancy.   Increased kyler asdz, mild pulmonary edema and elevated pBNP   Acute hypoxemic respiratory failure  Fevers-- ongoing aspiration?  Severe sepsis  AMS  Moderate dementia  Hypotension--resolved w/ IVF  Hyponatremia  Anemia Agree w/ empiric abx--zosyn, vanc  Agree w/ diuresis  Supp O2 for goal sats >90%  F/u BCx  SLP following  Dvt ppx: lovenox  Palliative care following  Recommend repeat chest CT in 4-6 weeks after completion of antibiotics to re-assess the mass-like lesion.            Daily Progression:    24 : Alert. No current complaints. Weaned to 3LPM. Temp 101 overnight. Cultures NGTD. HGB 5.9, receiving PRBCs. CXR increased kyler asdz > RLL. Now on Vanc. PCT 0.52 down from 1.06 4/3. pBNP 8,000.     24 : Resting in bed. On 4LPM. Tmax 100. WBC 9.2. Cultures NGTD. GI following - possible EGD/colonoscopy later this week.     : continues to spike fevers, tmax 101.7. no leukocytosis.  She c/o feeling cold and doesn't like the food here. Minimal po intake per nurse. Some coughing w/ eating.  Increased O2 requirement now on 5L w/ o2 sat 92%    : improved mentation today. Confused. Cold and asking for blankets. Limited history able to be obtained.  SLP at bedside, patient is refusing PO trials.  Febrile overnight to 102.8  Last evening again hypotensive but improved today  Hgb low on am labs but improved on recheck w/o intervention  On 2L O2    Consult for lung mass    HPI: 67 y/o F with PMH HTN, CVA, HTN, anxiety, bipolar disorder, dementia presented to the ED due to AMS x 1 week.  On arrival tachy , Febrile to 101.5. She became 
                           Pulmonary, Critical Care, and Sleep Medicine~Consult Note    Name: Lillie Claire MRN: [id number]   : 1955 Hospital: [facility]   Date: 2024 8:13 AM Admission: 2024     Impression Plan   Abnormal chest imaging--mass-like consolidation of the RLL, lymphadenopathy. Ddx includes infection, ?aspiration, malignancy.   Right pleural effusion transudative lymphocytic; cardiac?  Increased kyler asdz , mild pulmonary edema and elevated pBNP - clinically improved w/ diuresis  Acute hypoxemic respiratory failure  Fevers-- ongoing aspiration?  Severe sepsis  AMS  Moderate dementia  Hypotension--resolved w/ IVF  Hyponatremia  Anemia US guided biopsy Supraclavicular nodes per radiology.  If nondiagnostic patient will need further diagnostic studies  Wean oxygen as long as O2 saturation greater 90%   Mobilize as tolerated   Seen by SLP - cleared  Follow pleural fluid studies  Dvt ppx: lovenox  Palliative care following peripherally  If pt defervesces, repeat chest CT in 4-6 weeks          Daily Progression:  2024: Patient in no respiratory distress.  Patient groggy when awoken.  Patient reminded of biopsy and seems to nod in agreement.  Briefly described procedure.  Told that there is risk of bleeding and possibly collapsed lung but risk low due to ultrasound guidance.  Does not recall much of what happened yesterday when questioned.  Patient now has right IJ central line, ultrasound-guided biopsy supraclavicular nodes unable to be performed yesterday.  Patient had EGD.  Notes reviewed.  Activated PillCam placed.  Discussed with radiology.    24: CT C/A/P reviewed with radiology. Risks of diagnostic options considered. D/w ID, GI. EGD today. Not able to schedule EUS/ EBUS. Radiologist will try to see supraclavicular nodes by ultrasound for FNA/ biopsy. If non diagnostic will consider biopsy of subcarinal adenopathy. Called robb Pradhan to discuss and he 
                           Pulmonary, Critical Care, and Sleep Medicine~Consult Note    Name: Lillie Claire MRN: [id number]   : 1955 Hospital: [facility]   Date: 2024 11:11 AM Admission: 2024     Impression Plan   Severe mediastinal hilar lymphadenopathy with supraclavicular lymph node enlargement ; mass-like consolidation of the RLL,-preliminary positive malignancy,   Nondiagnostic thoracentesis of right pleural effusion 2024   Postobstructive pneumonia likely with completion of antibiotics per ID  Acute hypoxemic respiratory failure  Fevers-finally better   AMS  Moderate dementia Await pathology report from ultrasound-guided  biopsy Supraclavicular nodes per radiology.  Wean oxygen as long as O2 saturation greater 90%   Mobilize as tolerated   Seen by SLP - cleared  Dvt ppx: lovenox  Palliative care following closely.  Will check back Monday     Daily Progression:    2024: Patient brighter.  Actually hungry.  No shortness of breath.  Supraclavicular lymph node biopsy obtained yesterday on 2024.  Preliminary positive.  Final pending.  Discussed with attending.  Family aware of lung mass/adenopathy.  Family meeting today for palliative care.  Pleural fluid studies reviewed.  No malignant cells on 2024.    2024: Patient in no respiratory distress.  Patient groggy when awoken.  Patient reminded of biopsy and seems to nod in agreement.  Briefly described procedure.  Told that there is risk of bleeding and possibly collapsed lung but risk low due to ultrasound guidance.  Does not recall much of what happened yesterday when questioned.  Patient now has right IJ central line, ultrasound-guided biopsy supraclavicular nodes unable to be performed yesterday.  Patient had EGD.  Notes reviewed.  Activated PillCam placed.  Discussed with radiology.    11:32 AM  Ultrasound-guided supraclavicular lymph node biopsy completed.  Preliminary positive.  Sent to pathology 
                           Pulmonary, Critical Care, and Sleep Medicine~Consult Note    Name: Lillie Claire MRN: [id number]   : 1955 Hospital: [facility]   Date: 2024 12:29 PM Admission: 2024     Impression Plan   Abnormal chest imaging--mass-like consolidation of the RLL, lymphadenopathy. Ddx includes infection, ?aspiration, malignancy.   Acute hypoxemic respiratory failure  Fevers-- ongoing aspiration?  Severe sepsis  AMS  Moderate dementia  Hypotension--resolved w/ IVF  Hyponatremia  Anemia Agree w/ empiric abx--zosyn  Would re-culture if febrile again  F/U CXR  Supp O2 for goal sats >90%  F/u BCx  SLP following  Dvt ppx: lovenox  Palliative care following  Recommend repeat chest CT in 4-6 weeks after completion of antibiotics to re-assess the mass-like lesion.            Daily Progression:    24 : Resting in bed. ON 4LPM. Tmax 100. WBC 9.2. Cultures NGTD. GI following - possible EGD/colonoscopy later this week.     : continues to spike fevers, tmax 101.7. no leukocytosis.  She c/o feeling cold and doesn't like the food here. Minimal po intake per nurse. Some coughing w/ eating.  Increased O2 requirement now on 5L w/ o2 sat 92%    : improved mentation today. Confused. Cold and asking for blankets. Limited history able to be obtained.  SLP at bedside, patient is refusing PO trials.  Febrile overnight to 102.8  Last evening again hypotensive but improved today  Hgb low on am labs but improved on recheck w/o intervention  On 2L O2    Consult for lung mass    HPI: 69 y/o F with PMH HTN, CVA, HTN, anxiety, bipolar disorder, dementia presented to the ED due to AMS x 1 week.  On arrival tachy , Febrile to 101.5. She became hypotensive which responded to IVF bolus 2500cc total.  Fever again this afternoon 102.6  Hypoxic to 87%. Placed on 2L O2.    Labs: wbc 9.5 (w/ left shift), lactic acid 3.29, Na 124, procal 1.06    CT chest/abd/pelvis 4/3: 5 cm x 4.9 cm masslike 
         Gastroenterology Daily Progress Note   (CHANTELLE Ramírez for Dr. Alberts)   [facility]    Admit Date: 4/2/2024     CC: anemia    Subjective:       Hgb 7.7 after 1 unit of PRBC's yesterday.  Hgb was up to 10.1 yesterday after transfusion yesterday.  1 episode of melena overnight, 5 episodes yesterday.   Afebrile.  VSS.  1 L nasal cannula oxygen.       Current Facility-Administered Medications   Medication Dose Route Frequency    0.9 % sodium chloride infusion   IntraVENous PRN    furosemide (LASIX) injection 20 mg  20 mg IntraVENous BID    [START ON 4/10/2024] Vancomycin Level   Other RX Placeholder    pantoprazole (PROTONIX) tablet 40 mg  40 mg Oral BID AC    vancomycin (VANCOCIN) 1,000 mg in sodium chloride 0.9 % 250 mL IVPB (Reuo3Cml)  1,000 mg IntraVENous Q12H    0.9 % sodium chloride infusion   IntraVENous PRN    0.9 % sodium chloride infusion   IntraVENous PRN    [Held by provider] enoxaparin (LOVENOX) injection 40 mg  40 mg SubCUTAneous Daily    piperacillin-tazobactam (ZOSYN) 3,375 mg in sodium chloride 0.9 % 50 mL IVPB (mini-bag)  3,375 mg IntraVENous Q8H    diazePAM (VALIUM) tablet 1 mg  1 mg Oral Nightly    divalproex (DEPAKOTE) DR tablet 250 mg  250 mg Oral BID    mirtazapine (REMERON) tablet 15 mg  15 mg Oral Nightly    atorvastatin (LIPITOR) tablet 20 mg  20 mg Oral Daily    aspirin EC tablet 81 mg  81 mg Oral Daily    sennosides-docusate sodium (SENOKOT-S) 8.6-50 MG tablet 1 tablet  1 tablet Oral Daily    [Held by provider] dextromethorphan-quiNIDine (NUEDEXTA) 20-10 MG per capsule 1 capsule  1 capsule Oral BID    sodium chloride flush 0.9 % injection 5-40 mL  5-40 mL IntraVENous 2 times per day    sodium chloride flush 0.9 % injection 5-40 mL  5-40 mL IntraVENous PRN    0.9 % sodium chloride infusion   IntraVENous PRN    ondansetron (ZOFRAN-ODT) disintegrating tablet 4 mg  4 mg Oral Q8H PRN    Or    ondansetron (ZOFRAN) injection 4 mg  4 mg IntraVENous Q6H PRN    
         Gastroenterology Daily Progress Note   (CHANTELLE Ramírez for Dr. Alberts)   [facility]    Admit Date: 4/2/2024     CC: anemia    Subjective:       Hgb 7.7 (8.7, 10.1), transfused 1 unit 4/9 and 1 unit on 4/6  5 black tarry stools yesterday, 1 black tarry stool overnight  Nasal cannula down to 1L   Afebrile     Current Facility-Administered Medications   Medication Dose Route Frequency    vancomycin (VANCOCIN) 750 mg in sodium chloride 0.9 % 250 mL IVPB ([id number])  750 mg IntraVENous Q12H    magnesium sulfate 2000 mg in 50 mL IVPB premix  2,000 mg IntraVENous Once    potassium chloride 10 mEq/100 mL IVPB (Peripheral Line)  10 mEq IntraVENous Q1H    potassium chloride (KLOR-CON) extended release tablet 20 mEq  20 mEq Oral Daily    0.9 % sodium chloride infusion   IntraVENous PRN    furosemide (LASIX) injection 20 mg  20 mg IntraVENous BID    Vancomycin Level   Other RX Placeholder    pantoprazole (PROTONIX) tablet 40 mg  40 mg Oral BID AC    0.9 % sodium chloride infusion   IntraVENous PRN    0.9 % sodium chloride infusion   IntraVENous PRN    [Held by provider] enoxaparin (LOVENOX) injection 40 mg  40 mg SubCUTAneous Daily    piperacillin-tazobactam (ZOSYN) 3,375 mg in sodium chloride 0.9 % 50 mL IVPB (mini-bag)  3,375 mg IntraVENous Q8H    diazePAM (VALIUM) tablet 1 mg  1 mg Oral Nightly    divalproex (DEPAKOTE) DR tablet 250 mg  250 mg Oral BID    mirtazapine (REMERON) tablet 15 mg  15 mg Oral Nightly    atorvastatin (LIPITOR) tablet 20 mg  20 mg Oral Daily    aspirin EC tablet 81 mg  81 mg Oral Daily    sennosides-docusate sodium (SENOKOT-S) 8.6-50 MG tablet 1 tablet  1 tablet Oral Daily    [Held by provider] dextromethorphan-quiNIDine (NUEDEXTA) 20-10 MG per capsule 1 capsule  1 capsule Oral BID    sodium chloride flush 0.9 % injection 5-40 mL  5-40 mL IntraVENous 2 times per day    sodium chloride flush 0.9 % injection 5-40 mL  5-40 mL IntraVENous PRN    0.9 % sodium chloride 
         Gastroenterology Daily Progress Note   (CHANTELLE Ramírez for Dr. Diaz)   [facility]    Admit Date: 4/2/2024     CC: anemia    Subjective:     Hgb stable 8.4 (8.5)  No further melena per nursing    EGD 4/10/24 Findings:      Esophagus:   -           Normal esophagus in the proximal, mid and distal esophagus.     Stomach:   +          Normal gastric mucosa.  There was no blood in stomach. No AVMs.     Duodenum:   ++        (2) nonbleeding small AVMs in the posterior duodenal bulb s/p APC cauterization.  +          In the duodenal 2nd portion there was bleeding and irrigation revealed active bleeding from an AVM on the medial wall.  Lavaging revealed persistent bleeding.  We placed (2) regular sized EndoClips but still some oozing and we added a larger Ultra CLIP and this in addition to further APC to the site helped stop any further bleeding.  Scope was advanced to the junction of 2nd/3rd portion duodenum.       Current Facility-Administered Medications   Medication Dose Route Frequency    [START ON 4/12/2024] Vancomycin Level   Other RX Placeholder    vancomycin (VANCOCIN) 750 mg in sodium chloride 0.9 % 250 mL IVPB ([id number])  750 mg IntraVENous Q12H    potassium chloride (KLOR-CON) extended release tablet 20 mEq  20 mEq Oral Daily    0.9 % sodium chloride infusion   IntraVENous PRN    furosemide (LASIX) injection 20 mg  20 mg IntraVENous BID    pantoprazole (PROTONIX) tablet 40 mg  40 mg Oral BID AC    0.9 % sodium chloride infusion   IntraVENous PRN    0.9 % sodium chloride infusion   IntraVENous PRN    [Held by provider] enoxaparin (LOVENOX) injection 40 mg  40 mg SubCUTAneous Daily    piperacillin-tazobactam (ZOSYN) 3,375 mg in sodium chloride 0.9 % 50 mL IVPB (mini-bag)  3,375 mg IntraVENous Q8H    diazePAM (VALIUM) tablet 1 mg  1 mg Oral Nightly    divalproex (DEPAKOTE) DR tablet 250 mg  250 mg Oral BID    mirtazapine (REMERON) tablet 15 mg  15 mg Oral Nightly    atorvastatin 
         Gastroenterology Daily Progress Note   (CHANTELLE Ramírez for Dr. Diaz)   [facility]    Admit Date: 4/2/2024     CC: anemia    Subjective:       Hgb dropped overnight from 7.5 to 5.9.  She had a large black tarry stool this morning and 3 black tarry stools overnight.  Blood transfusion ordered by primary team.  Patient has antibodies and blood had to be obtained from outside hospital.      CXR 4/8/24 showed increased bilateral pulmonary infiltrates and edema.  Tm 101 overnight.  Vancomycin ordered.  Still requiring 4L oxygen.  Lasix ordered.           Current Facility-Administered Medications   Medication Dose Route Frequency    0.9 % sodium chloride infusion   IntraVENous PRN    furosemide (LASIX) injection 20 mg  20 mg IntraVENous BID    [START ON 4/10/2024] Vancomycin Level   Other RX Placeholder    pantoprazole (PROTONIX) tablet 40 mg  40 mg Oral BID AC    vancomycin (VANCOCIN) 1,000 mg in sodium chloride 0.9 % 250 mL IVPB ([id number])  1,000 mg IntraVENous Q12H    0.9 % sodium chloride infusion   IntraVENous PRN    0.9 % sodium chloride infusion   IntraVENous PRN    [Held by provider] enoxaparin (LOVENOX) injection 40 mg  40 mg SubCUTAneous Daily    piperacillin-tazobactam (ZOSYN) 3,375 mg in sodium chloride 0.9 % 50 mL IVPB (mini-bag)  3,375 mg IntraVENous Q8H    diazePAM (VALIUM) tablet 1 mg  1 mg Oral Nightly    divalproex (DEPAKOTE) DR tablet 250 mg  250 mg Oral BID    mirtazapine (REMERON) tablet 15 mg  15 mg Oral Nightly    atorvastatin (LIPITOR) tablet 20 mg  20 mg Oral Daily    aspirin EC tablet 81 mg  81 mg Oral Daily    sennosides-docusate sodium (SENOKOT-S) 8.6-50 MG tablet 1 tablet  1 tablet Oral Daily    [Held by provider] dextromethorphan-quiNIDine (NUEDEXTA) 20-10 MG per capsule 1 capsule  1 capsule Oral BID    sodium chloride flush 0.9 % injection 5-40 mL  5-40 mL IntraVENous 2 times per day    sodium chloride flush 0.9 % injection 5-40 mL  5-40 mL IntraVENous PRN 
        Hospitalist Progress Note    NAME:   Lillie Claire   : 1955   MRN: [id number]     Date/Time: 2024 1:38 PM  Patient PCP: Husam Foster, VALERIE - NP    Estimated discharge date: 2024  Barriers: Needs palliative consult        Assessment / Plan:  Severe sepsis most likely pneumonia  Acute septic encephalopathy- in the setting of infection   Right lobe lung mass  Acute pulmonary edema  Biopsy result shows: metastatic undifferentiated carcinoma     CT of the chest showed  IMPRESSION:  1. 5 cm x 4.9 cm masslike partial consolidation of the superior segment of the right lower lobe.   2. Right paratracheal, subcarinal and right hilar lymphadenopathy.  3. Bronchoscopy and/or CT PET scan can be performed for further evaluation, as indicated.     -Blood cultures negative.  Respiratory viral panel negative including COVID-19  -Continue Zosyn.  And added vancomycin due to fever of 101 and also continued hypoxia  -Evaluated by pulmonary and recommended repeating a CT scan in about 4 weeks for further evaluation of right lung mass  -Continue oxygen by nasal cannula, currently on 5 L and wean as tolerated.   On  :  Chest x-ray  shows bilateral airspace opacities increased along with mild pulmonary edema.  -proBNP elevated at 8000.  Start Lasix 20 mg IV twice daily.  Check 2D echocardiogram.     4/10  Has been spiking fever, will repeat Cx, SLP eval  Replete saud   CXR in AM for f/up  If IV access lost then consult Anesthesia for central line vs EJ       Cont to spike fever, check HIV, hepatitis panel, CRP, sedrate, PANCHO, RF . Consider Bronch  Will consult ID  Repeat Cx  Pulm recs thoracocentesis   Lost IV access, called anesthesia for central line  Updated brother over the phone        No further fever, labs pending.  Reviewed ID recommendation, continue current antibiotics.  Plan for thoracentesis today     :  Tmax-101.1   Leucocytosis resolved   CRP-21   HIV - Non reactive   S/p 
        Hospitalist Progress Note    NAME:   Lillie Claire   : 1955   MRN: [id number]     Date/Time: 2024 12:36 PM  Patient PCP: Husam Foster APRN - NP    Estimated discharge date: 2024  Barriers: Needs palliative consult        Assessment / Plan:  Severe sepsis most likely pneumonia  Acute septic encephalopathy- in the setting of infection   Right lobe lung mass  Acute pulmonary edema  Biopsy result shows: metastatic undifferentiated carcinoma     CT of the chest showed  IMPRESSION:  1. 5 cm x 4.9 cm masslike partial consolidation of the superior segment of the right lower lobe.   2. Right paratracheal, subcarinal and right hilar lymphadenopathy.  3. Bronchoscopy and/or CT PET scan can be performed for further evaluation, as indicated.     -Blood cultures negative.  Respiratory viral panel negative including COVID-19  -Continue Zosyn.  And added vancomycin due to fever of 101 and also continued hypoxia  -Evaluated by pulmonary and recommended repeating a CT scan in about 4 weeks for further evaluation of right lung mass  -Continue oxygen by nasal cannula, currently on 5 L and wean as tolerated.   On  :  Chest x-ray  shows bilateral airspace opacities increased along with mild pulmonary edema.  -proBNP elevated at 8000.  Start Lasix 20 mg IV twice daily.  Check 2D echocardiogram.     4/10  Has been spiking fever, will repeat Cx, SLP eval  Replete saud   CXR in AM for f/up  If IV access lost then consult Anesthesia for central line vs EJ       Cont to spike fever, check HIV, hepatitis panel, CRP, sedrate, PANCHO, RF . Consider Bronch  Will consult ID  Repeat Cx  Pulm recs thoracocentesis   Lost IV access, called anesthesia for central line  Updated brother over the phone        No further fever, labs pending.  Reviewed ID recommendation, continue current antibiotics.  Plan for thoracentesis today     :  Tmax-101.1   Leucocytosis resolved   CRP-21   HIV - Non reactive   S/p 
        Hospitalist Progress Note    NAME:   Lillie Claire   : 1955   MRN: [id number]     Date/Time: 2024 8:18 PM  Patient PCP: Husam Foster APRN - NP    Estimated discharge date:   Barriers: anemia improvement, respiratory improvement        Assessment / Plan:  Severe sepsis most likely pneumonia  Acute septic encephalopathy- in the setting of infection   Right lobe lung mass  Acute pulmonary edema     CT of the chest showed  IMPRESSION:  1. 5 cm x 4.9 cm masslike partial consolidation of the superior segment of the right lower lobe.   2. Right paratracheal, subcarinal and right hilar lymphadenopathy.  3. Bronchoscopy and/or CT PET scan can be performed for further evaluation, as indicated.     -Blood cultures negative.  Respiratory viral panel negative including COVID-19  -Continue Zosyn.  And added vancomycin due to fever of 101 and also continued hypoxia  -Evaluated by pulmonary and recommended repeating a CT scan in about 4 weeks for further evaluation of right lung mass  -Continue oxygen by nasal cannula, currently on 5 L and wean as tolerated.   On  :  Chest x-ray  shows bilateral airspace opacities increased along with mild pulmonary edema.  -proBNP elevated at 8000.  Start Lasix 20 mg IV twice daily.  Check 2D echocardiogram.     4/10  Has been spiking fever, will repeat Cx, SLP eval  Replete lytes   CXR in AM for f/up  If IV access lost then consult Anesthesia for central line vs EJ       Cont to spike fever, check HIV, hepatitis panel, CRP, sedrate, PANCHO, RF . Consider Bronch  Will consult ID  Repeat Cx  Pulm recs thoracocentesis   Lost IV access, called anesthesia for central line  Updated brother over the phone        No further fever, labs pending.  Reviewed ID recommendation, continue current antibiotics.  Plan for thoracentesis today     :  Tmax-101.1   Leucocytosis resolved   CRP-21   HIV - Non reactive   S/p thoracocentesis on  : transudative pleural effusion 
      Hospitalist Progress Note    NAME:   Lillie Claire   : 1955   MRN: [id number]     Date/Time: 2024 12:25 PM  Patient PCP: Husam Foster APRN - NP    Estimated discharge date:   Barriers: anemia improvement, respiratory improvement      Assessment / Plan:  Severe sepsis most likely pneumonia  Acute septic encephalopathy- in the setting of infection   Right lobe lung mass  Acute pulmonary edema     CT of the chest showed  IMPRESSION:  1. 5 cm x 4.9 cm masslike partial consolidation of the superior segment of the right lower lobe.   2. Right paratracheal, subcarinal and right hilar lymphadenopathy.  3. Bronchoscopy and/or CT PET scan can be performed for further evaluation, as indicated.     -Blood cultures negative.  Respiratory viral panel negative including COVID-19  -Continue Zosyn.  And added vancomycin due to fever of 101 and also continued hypoxia  -Evaluated by pulmonary and recommended repeating a CT scan in about 4 weeks for further evaluation of right lung mass  -Continue oxygen by nasal cannula, currently on 5 L and wean as tolerated.   On  :  Chest x-ray  shows bilateral airspace opacities increased along with mild pulmonary edema.  -proBNP elevated at 8000.  Start Lasix 20 mg IV twice daily.  Check 2D echocardiogram.     4/10  Has been spiking fever, will repeat Cx, SLP eval  Replete lytes   CXR in AM for f/up  If IV access lost then consult Anesthesia for central line vs EJ      Cont to spike fever, check HIV, hepatitis panel, CRP, sedrate, PANCHO, RF . Consider Bronch  Will consult ID  Repeat Cx  Pulm recs thoracocentesis   Lost IV access, called anesthesia for central line  Updated brother over the phone        No further fever, labs pending.  Reviewed ID recommendation, continue current antibiotics.  Plan for thoracentesis today    :  Tmax-101.1   Leucocytosis resolved   CRP-21   HIV - Non reactive   S/p thoracocentesis on  : transudative pleural effusion 
      Hospitalist Progress Note    NAME:   Lillie Claire   : 1955   MRN: [id number]     Date/Time: 4/15/2024 8:12 AM  Patient PCP: Husam Foster, VALERIE - NP    Estimated discharge date:  Barriers:       Assessment / Plan:  Severe sepsis most likely pneumonia  Acute septic encephalopathy- in the setting of infection   Right lobe lung mass  Acute pulmonary edema     CT of the chest showed  IMPRESSION:  1. 5 cm x 4.9 cm masslike partial consolidation of the superior segment of the right lower lobe.   2. Right paratracheal, subcarinal and right hilar lymphadenopathy.  3. Bronchoscopy and/or CT PET scan can be performed for further evaluation, as indicated.     -Blood cultures negative.  Respiratory viral panel negative including COVID-19  -Continue Zosyn.  And added vancomycin due to fever of 101 and also continued hypoxia  -Evaluated by pulmonary and recommended repeating a CT scan in about 4 weeks for further evaluation of right lung mass  -Continue oxygen by nasal cannula, currently on 5 L and wean as tolerated.   On  :  Chest x-ray  shows bilateral airspace opacities increased along with mild pulmonary edema.  -proBNP elevated at 8000.  Start Lasix 20 mg IV twice daily.  Check 2D echocardiogram.     4/10  Has been spiking fever, will repeat Cx, SLP eval  Replete lytes   CXR in AM for f/up  If IV access lost then consult Anesthesia for central line vs EJ      Cont to spike fever, check HIV, hepatitis panel, CRP, sedrate, PANCHO, RF . Consider Bronch  Will consult ID  Repeat Cx  Pulm recs thoracocentesis   Lost IV access, called anesthesia for central line  Updated brother over the phone        No further fever, labs pending.  Reviewed ID recommendation, continue current antibiotics.  Plan for thoracentesis today    :  Tmax-101.1   Leucocytosis resolved   CRP-21   HIV - Non reactive   S/p thoracocentesis on  : transudative pleural effusion   Fluid culture - no growth   Fluid cytology 
      Hospitalist Progress Note    NAME:   Lillie Claire   : 1955   MRN: [id number]     Date/Time: 2024 8:15 AM  Patient PCP: Husam Foster, VALERIE - NP    Estimated discharge date:  Barriers:       Assessment / Plan:  Severe sepsis most likely pneumonia  Acute septic encephalopathy- in the setting of infection   Right lobe lung mass  Acute pulmonary edema     CT of the chest showed  IMPRESSION:  1. 5 cm x 4.9 cm masslike partial consolidation of the superior segment of the  right lower lobe. 2. Right paratracheal, subcarinal and right hilar  lymphadenopathy.  3. Bronchoscopy and/or CT PET scan can be performed for further evaluation, as  indicated.     -Blood cultures negative.  Respiratory viral panel negative including COVID-19  -Continue Zosyn.  And added vancomycin due to fever of 101 and also continued hypoxia  -Evaluated by pulmonary and recommended repeating a CT scan in about 4 weeks for further evaluation of right lung mass  -Continue oxygen by nasal cannula, currently on 5 L and wean as tolerated.   On  :  Chest x-ray  shows bilateral airspace opacities increased along with mild pulmonary edema.  -proBNP elevated at 8000.  Start Lasix 20 mg IV twice daily.  Check 2D echocardiogram.     4/10  Has been spiking fever, will repeat Cx, SLP eval  Replete lytes   CXR in AM for f/up  If IV access lost then consult Anesthesia for central line vs EJ      Cont to spike fever, check HIV, hepatitis panel, CRP, sedrate, PANCHO, RF . Consider Bronch  Will consult ID  Repeat Cx  Pulm recs thoracocentesis   Lost IV access, called anesthesia for central line  Updated brother over the phone        No further fever, labs pending.  Reviewed ID recommendation, continue current antibiotics.  Plan for thoracentesis today    :  Tmax-101.1   Leucocytosis resolved   CRP-21   HIV - Non reactive   S/p thoracocentesis on  : exudative pleural effusion   Fluid culture - no growth   Fluid cytology 
      Hospitalist Progress Note    NAME:   Lillie Claire   : 1955   MRN: [id number]     Date/Time: 2024 9:03 AM  Patient PCP: Husam Foster, APRN - NP    Estimated discharge date:   Barriers: Improvement of shortness of breath, anemia, GI clearance    Assessment / Plan:  Severe sepsis most likely pneumonia  Acute septic encephalopathy- in the setting of infection   Right lobe lung mass  Acute pulmonary edema    CT of the chest showed  IMPRESSION:  1. 5 cm x 4.9 cm masslike partial consolidation of the superior segment of the  right lower lobe. 2. Right paratracheal, subcarinal and right hilar  lymphadenopathy.  3. Bronchoscopy and/or CT PET scan can be performed for further evaluation, as  indicated.    -Blood cultures negative.  Respiratory viral panel negative including COVID-19  -Continue Zosyn.  Will add vancomycin due to fever of 101 and also continued hypoxia  -Evaluated by pulmonary and recommended repeating a CT scan in about 4 weeks for further evaluation of right lung mass  -Continue oxygen by nasal cannula, currently on 5 L and wean as tolerated.  -Chest x-ray on  shows bilateral airspace opacities increased along with mild pulmonary edema.  -proBNP elevated at 8000.  Start Lasix 20 mg IV twice daily.  Check 2D echocardiogram.    4/10  EGD: 2 nonbleeding small AVMs in the posterior duodenal bulb s/p APC cauterization.In the duodenal 2nd portion there was bleeding and irrigation revealed active bleeding from an AVM on the medial wall.  Lavaging revealed persistent bleeding.  We placed (2) regular sized EndoClips but still some oozing and we added a larger Ultra CLIP and this in addition to further APC to the site helped stop any further bleeding  CBC q12h  Keep Hb >7  Has been spiking fever, will repeat Cx, SLP eval  Replete lytes   CXR in AM for f/up  If IV access lost then consult Anesthesia for central line vs EJ    Cont to spike fever, check HIV, hepatitis panel, CRP, 
      Hospitalist Progress Note    NAME:   Lillie Claire   : 1955   MRN: [id number]     Date/Time: 2024 3:05 PM  Patient PCP: Husam Foster APRN - NP    Estimated discharge date:  Barriers: Hb stability , HR stability ,lab improvement       Assessment / Plan:  Recurrent  rapid response overnight for new onset A-fib with RVR on /  Recurrent fever   Patient heart rate went up to 130s,, s/p dilt drip , stopped , increase metoprolol , discussed with cards   Might need diltiazem PO  if not controlled   Heart rate back to normal sinus rhythm and controlled this am on dilt drip   Fever 101.2  , pt already on abx   : Procalcitonin is 0.29, most likely fever due to malignancy  Afebrile today.  Blood work from count continues to trend down    Hypokalemia with potassium of 2.6  .  Repleted with IV potassium and p.o. potassium    Recurrent fever   Recurrent sepsis and pneumonia  Started on IV Zosyn and vancomycin  High risk for for further decompensation  WBC trending down    Acute on chronic anemia  Hemoglobin was 6.5 on  repeat hemoglobin few hours later was 7 without blood transfusion  Hemoglobin dropped down to 5.8 on ,  a unit of PRBC ordered, repeat H&H   : Patient received a unit of PRBC overnight repeat hemoglobin is 6.7, receiving second unit around 6 AM  Repeat H&H  : Hemoglobin stable around 7.7    Hypokalemia recurrent  Recurrent EMILY resolved  Potassium 3.1, will replete  Creatinine trending up to 1.42, renal ultrasound did not show any hydronephrosis  Creatinine trending down, encourage oral intake    Severe sepsis most likely pneumonia  Acute septic encephalopathy- in the setting of infection   Right lobe lung mass  Acute pulmonary edema  Biopsy result shows: metastatic undifferentiated carcinoma     CT of the chest showed  IMPRESSION:  1. 5 cm x 4.9 cm masslike partial consolidation of the superior segment of the right lower lobe.   2. Right 
      Hospitalist Progress Note    NAME:   Lillie Claire   : 1955   MRN: [id number]     Date/Time: 2024 2:44 PM  Patient PCP: Husam Foster APRN - NP    Estimated discharge date:   Barriers: anemia improvement, respiratory improvement      Assessment / Plan:  Severe sepsis most likely pneumonia  Acute septic encephalopathy- in the setting of infection   Right lobe lung mass  Acute pulmonary edema     CT of the chest showed  IMPRESSION:  1. 5 cm x 4.9 cm masslike partial consolidation of the superior segment of the right lower lobe.   2. Right paratracheal, subcarinal and right hilar lymphadenopathy.  3. Bronchoscopy and/or CT PET scan can be performed for further evaluation, as indicated.     -Blood cultures negative.  Respiratory viral panel negative including COVID-19  -Continue Zosyn.  And added vancomycin due to fever of 101 and also continued hypoxia  -Evaluated by pulmonary and recommended repeating a CT scan in about 4 weeks for further evaluation of right lung mass  -Continue oxygen by nasal cannula, currently on 5 L and wean as tolerated.   On  :  Chest x-ray  shows bilateral airspace opacities increased along with mild pulmonary edema.  -proBNP elevated at 8000.  Start Lasix 20 mg IV twice daily.  Check 2D echocardiogram.     4/10  Has been spiking fever, will repeat Cx, SLP eval  Replete lytes   CXR in AM for f/up  If IV access lost then consult Anesthesia for central line vs EJ      Cont to spike fever, check HIV, hepatitis panel, CRP, sedrate, PANCHO, RF . Consider Bronch  Will consult ID  Repeat Cx  Pulm recs thoracocentesis   Lost IV access, called anesthesia for central line  Updated brother over the phone        No further fever, labs pending.  Reviewed ID recommendation, continue current antibiotics.  Plan for thoracentesis today    :  Tmax-101.1   Leucocytosis resolved   CRP-21   HIV - Non reactive   S/p thoracocentesis on  : transudative pleural effusion 
      Hospitalist Progress Note    NAME:   Lillie Claire   : 1955   MRN: [id number]     Date/Time: 2024 12:06 PM  Patient PCP: Husam Foster APRN - NP    Estimated discharge date:  Barriers: Hb stability      Assessment / Plan:  Status post rapid response overnight for new onset A-fib with RVR on   Patient heart rate went up to 130s,, status post dill drip now on the drip  Heart rate back to normal sinus rhythm and controlled      Recurrent fever   Recurrent sepsis and pneumonia  Started on IV Zosyn and vancomycin  High risk for for further decompensation  WBC trending down    Acute on chronic anemia  Hemoglobin was 6.5 on  repeat hemoglobin few hours later was 7 without blood transfusion  Hemoglobin dropped down to 5.8 on ,  a unit of PRBC ordered, repeat H&H   : Patient received a unit of PRBC overnight repeat hemoglobin is 6.7, receiving second unit around 6 AM  Repeat H&H    Hypokalemia resolved  Recurrent EMILY resolved  Potassium 3.1, will replete  Creatinine trending up to 1.42, renal ultrasound did not show any hydronephrosis  Creatinine trending down, encourage oral intake    Severe sepsis most likely pneumonia  Acute septic encephalopathy- in the setting of infection   Right lobe lung mass  Acute pulmonary edema  Biopsy result shows: metastatic undifferentiated carcinoma     CT of the chest showed  IMPRESSION:  1. 5 cm x 4.9 cm masslike partial consolidation of the superior segment of the right lower lobe.   2. Right paratracheal, subcarinal and right hilar lymphadenopathy.  3. Bronchoscopy and/or CT PET scan can be performed for further evaluation, as indicated.     -Blood cultures negative.  Respiratory viral panel negative including COVID-19  -Continue Zosyn.  And added vancomycin due to fever of 101 and also continued hypoxia  -Evaluated by pulmonary and recommended repeating a CT scan in about 4 weeks for further evaluation of right lung mass  -Continue 
      Hospitalist Progress Note    NAME:   Lillie Claire   : 1955   MRN: [id number]     Date/Time: 2024 4:39 PM  Patient PCP: Husam Foster, APRN - NP    Estimated discharge date:48h  Barriers: Resolution of fever and sepsis workup      Assessment / Plan:  Recurrent fever   Recurrent sepsis and pneumonia  Started on IV Zosyn and vancomycin  High risk for for further decompensation    Acute on chronic anemia  Hemoglobin was 6.5 this morning, repeat hemoglobin few hours later was 7 without blood transfusion    Hypokalemia  Recurrent EMILY  Potassium 3.1, will replete  Creatinine trending up to 1.42, check renal ultrasound  Continue IV fluid    Severe sepsis most likely pneumonia  Acute septic encephalopathy- in the setting of infection   Right lobe lung mass  Acute pulmonary edema  Biopsy result shows: metastatic undifferentiated carcinoma     CT of the chest showed  IMPRESSION:  1. 5 cm x 4.9 cm masslike partial consolidation of the superior segment of the right lower lobe.   2. Right paratracheal, subcarinal and right hilar lymphadenopathy.  3. Bronchoscopy and/or CT PET scan can be performed for further evaluation, as indicated.     -Blood cultures negative.  Respiratory viral panel negative including COVID-19  -Continue Zosyn.  And added vancomycin due to fever of 101 and also continued hypoxia  -Evaluated by pulmonary and recommended repeating a CT scan in about 4 weeks for further evaluation of right lung mass  -Continue oxygen by nasal cannula, currently on 5 L and wean as tolerated.      : Chest x-ray  shows bilateral airspace opacities increased along with mild pulmonary edema.  -proBNP elevated at 8000.  Start Lasix 20 mg IV twice daily.  Check 2D echocardiogram.     4/10: Has been spiking fever, will repeat Cx, SLP eval  Replete lytes   CXR in AM for f/up  If IV access lost then consult Anesthesia for central line vs EJ     : Cont to spike fever, check HIV, hepatitis panel, CRP, 
      Hospitalist Progress Note    NAME:   Lillie Claire   : 1955   MRN: [id number]     Date/Time: 2024 7:25 AM  Patient PCP: Husam Foster, VALERIE - NP    Estimated discharge date:   Barriers: anemia improvement, respiratory improvement        Assessment / Plan:  Severe sepsis most likely pneumonia  Acute septic encephalopathy- in the setting of infection   Right lobe lung mass  Acute pulmonary edema     CT of the chest showed  IMPRESSION:  1. 5 cm x 4.9 cm masslike partial consolidation of the superior segment of the right lower lobe.   2. Right paratracheal, subcarinal and right hilar lymphadenopathy.  3. Bronchoscopy and/or CT PET scan can be performed for further evaluation, as indicated.     -Blood cultures negative.  Respiratory viral panel negative including COVID-19  -Continue Zosyn.  And added vancomycin due to fever of 101 and also continued hypoxia  -Evaluated by pulmonary and recommended repeating a CT scan in about 4 weeks for further evaluation of right lung mass  -Continue oxygen by nasal cannula, currently on 5 L and wean as tolerated.   On  :  Chest x-ray  shows bilateral airspace opacities increased along with mild pulmonary edema.  -proBNP elevated at 8000.  Start Lasix 20 mg IV twice daily.  Check 2D echocardiogram.     4/10  Has been spiking fever, will repeat Cx, SLP eval  Replete lytes   CXR in AM for f/up  If IV access lost then consult Anesthesia for central line vs EJ       Cont to spike fever, check HIV, hepatitis panel, CRP, sedrate, PANCHO, RF . Consider Bronch  Will consult ID  Repeat Cx  Pulm recs thoracocentesis   Lost IV access, called anesthesia for central line  Updated brother over the phone        No further fever, labs pending.  Reviewed ID recommendation, continue current antibiotics.  Plan for thoracentesis today     :  Tmax-101.1   Leucocytosis resolved   CRP-21   HIV - Non reactive   S/p thoracocentesis on  : transudative pleural effusion 
      Hospitalist Progress Note    NAME:   Lillie Claire   : 1955   MRN: [id number]     Date/Time: 2024 1:19 PM  Patient PCP: Husam Foster APRN - NP    Estimated discharge date:24  Barriers: need electrolyte improvement , pulm clearance , nephro clearance     Assessment / Plan:  Severe sepsis most likely pneumonia  Acute septic encephalopathy- in the setting of infection   Abnormal CT of the chest?Lung mass   Lactate - 3.29>>0.62  RVP - negative  CT of the chest showed  IMPRESSION:  1. 5 cm x 4.9 cm masslike partial consolidation of the superior segment of the  right lower lobe. 2. Right paratracheal, subcarinal and right hilar  lymphadenopathy.  3. Bronchoscopy and/or CT PET scan can be performed for further evaluation, as  indicated.  Patient admitted to medical [facility]   Continue with Zosyn and status post vancomycin  Cultures negative to date  MRSA screen negative  Pulmonology following-  patient will need repeat ct in 4-6 weeks   Continue o2 to maintain sats >90 %  Pulmonology order for repeat x-ray today will follow-up  Pulmonology clearance before discharge.  Palliative followed up with patient discussed with family - and patient would be continued on full care at this moment .   Will get pt eval    Hyponatremia, most likely due to dehydration  Sodium 124- on arrival   Continue  with normal saline  Nephro consulted- will discuss with renal , bmp trends   Patient placed back on IV fluids by nephrology.  Sodium improved today    Hypokalemia  Potassium 3.2 today  Order for supplementation    Hypertension  Patient is on metoprolol, BP was soft on admission, improved  Resumed metoprolol low-dose with holding parameters     Hyperlipidemia  Continue with Lipitor     Bipolar disease  Continue with Depakote, Remeron,  Valium  [facility]- pharmacy mentioned that the family could not bring the meds - hence on hold for now     Anemia - normocytic   Possible iron deficiency anemia  Iron studies with 
      Hospitalist Progress Note    NAME:   Lillie Claire   : 1955   MRN: [id number]     Date/Time: 2024 1:23 PM  Patient PCP: Husam Foster APRN - NP    Estimated discharge date:24  Barriers: need electrolyte improvement , pulm clearance , nephro clearance     Assessment / Plan:  Severe sepsis most likely pneumonia  Acute septic encephalopathy- in the setting of infection   Abnormal CT of the chest?Lung mass   Lactate - 3.29>>0.62  RVP - negative  CT of the chest showed  IMPRESSION:  1. 5 cm x 4.9 cm masslike partial consolidation of the superior segment of the  right lower lobe. 2. Right paratracheal, subcarinal and right hilar  lymphadenopathy.  3. Bronchoscopy and/or CT PET scan can be performed for further evaluation, as  indicated.  Patient admitted to medical [facility]   Continue with Zosyn and status post vancomycin  Cultures negative to date  MRSA screen negative  SLP eval- once patient is able   Pulmonology following-  patient will need repeat ct in 4-6 weeks   Continue o2 to maintain sats >90 %  Pulmonology order for repeat x-ray today will follow-up  Pulmonology clearance before discharge.  Discussed with family yesterday about the patient's clinical situation.  Palliative followed up with patient discussed with family - and patient would be continued on full care at this moment .   Will get pt eval today .    Hyponatremia, most likely due to dehydration  Sodium 124- on arrival   Continue  with normal saline  Nephro consulted- will discuss with renal , bmp trends   Waiting for labs today   Patient placed back on IV fluids by nephrology.    Hypertension  Patient is on metoprolol, BP was soft on admission, improved  Resumed metoprolol low-dose with holding parameters     Hyperlipidemia  Continue with Lipitor     Bipolar disease  Continue with Depakote, Remeron,  Valium  [facility]- pharmacy mentioned that the family could not bring the meds - hence on hold for now     Anemia - normocytic 
      Hospitalist Progress Note    NAME:   Lillie Claire   : 1955   MRN: [id number]     Date/Time: 4/10/2024 10:00 AM  Patient PCP: Husam Foster, APRN - NP    Estimated discharge date:   Barriers: Improvement of shortness of breath, anemia, GI clearance    Assessment / Plan:  Severe sepsis most likely pneumonia  Acute septic encephalopathy- in the setting of infection   Right lobe lung mass  Acute pulmonary edema    CT of the chest showed  IMPRESSION:  1. 5 cm x 4.9 cm masslike partial consolidation of the superior segment of the  right lower lobe. 2. Right paratracheal, subcarinal and right hilar  lymphadenopathy.  3. Bronchoscopy and/or CT PET scan can be performed for further evaluation, as  indicated.    -Blood cultures negative.  Respiratory viral panel negative including COVID-19  -Continue Zosyn.  Will add vancomycin due to fever of 101 and also continued hypoxia  -Evaluated by pulmonary and recommended repeating a CT scan in about 4 weeks for further evaluation of right lung mass  -Continue oxygen by nasal cannula, currently on 5 L and wean as tolerated.  -Chest x-ray on  shows bilateral airspace opacities increased along with mild pulmonary edema.  -proBNP elevated at 8000.  Start Lasix 20 mg IV twice daily.  Check 2D echocardiogram.    4/10  EGD: 2 nonbleeding small AVMs in the posterior duodenal bulb s/p APC cauterization.In the duodenal 2nd portion there was bleeding and irrigation revealed active bleeding from an AVM on the medial wall.  Lavaging revealed persistent bleeding.  We placed (2) regular sized EndoClips but still some oozing and we added a larger Ultra CLIP and this in addition to further APC to the site helped stop any further bleeding  CBC q12h  Keep Hb >7  Has been spiking fever, will repeat Cx, SLP eval  Replete lytes   CXR in AM for f/up  If IV access lost then consult Anesthesia for central line vs EJ    Hyponatremia multifactorial in etiology  -Sodium is currently 
      Hospitalist Progress Note    NAME:   Lillie Claire   : 1955   MRN: [id number]     Date/Time: 2024 1:33 PM  Patient PCP: Husam Foster APRN - NP    Estimated discharge date:24  Barriers: need electrolyte improvement , pulm clearance , nephro clearance     Assessment / Plan:  Severe sepsis most likely pneumonia  Acute septic encephalopathy- in the setting of infection   Abnormal CT of the chest?Lung mass   Lactate - 3.29>>0.62  RVP - negative  CT of the chest showed  IMPRESSION:  1. 5 cm x 4.9 cm masslike partial consolidation of the superior segment of the  right lower lobe. 2. Right paratracheal, subcarinal and right hilar  lymphadenopathy.  3. Bronchoscopy and/or CT PET scan can be performed for further evaluation, as  indicated.  Patient admitted to medical tele   Continue with Zosyn and vancomycin  Follow-up blood cultures and sputum cultures, MRSA screening   SLP eval- once patient is able   Pulmonology following-  patient will need repeat ct in 4-6 weeks   Continue o2 to maintain sats >90 %      Hyponatremia, most likely due to dehydration  Sodium 124- on arrival   Continue  with normal saline  Nephro consulted- will discuss with renal , bmp trends   NA TODAY 128     Hypertension  Patient is on metoprolol, BP was soft on admission, improved  Resume metoprolol low-dose with holding parameters     Hyperlipidemia  Continue with Lipitor     Bipolar disease  Continue with Depakote, Remeron,  Valium  [facility]- pharmacy mentioned that the family could not bring the meds - hence on hold for now     Anemia - normocytic   Will get iron studies , fobt   Goal of trans hb > 7     Abdominal pain - on rt flank this am   UA - normal   Ct cap - negative for intraabd patho   Will monitor for now     Medical Decision Making:   I personally reviewed labs: CBC BMP  I personally reviewed imaging: CT of the chest and abdomen- 1. 5 cm x 4.9 cm masslike partial consolidation of the superior segment of the 
      Hospitalist Progress Note    NAME:   Lillie Claire   : 1955   MRN: [id number]     Date/Time: 2024 8:43 AM  Patient PCP: Husam Foster, APRN - NP    Estimated discharge date:48h  Barriers: Resolution of fever and sepsis workup      Assessment / Plan:  Recurrent fever   Recurrent sepsis and pneumonia  Started on IV Zosyn and vancomycin  High risk for for further decompensation    Severe sepsis most likely pneumonia  Acute septic encephalopathy- in the setting of infection   Right lobe lung mass  Acute pulmonary edema  Biopsy result shows: metastatic undifferentiated carcinoma     CT of the chest showed  IMPRESSION:  1. 5 cm x 4.9 cm masslike partial consolidation of the superior segment of the right lower lobe.   2. Right paratracheal, subcarinal and right hilar lymphadenopathy.  3. Bronchoscopy and/or CT PET scan can be performed for further evaluation, as indicated.     -Blood cultures negative.  Respiratory viral panel negative including COVID-19  -Continue Zosyn.  And added vancomycin due to fever of 101 and also continued hypoxia  -Evaluated by pulmonary and recommended repeating a CT scan in about 4 weeks for further evaluation of right lung mass  -Continue oxygen by nasal cannula, currently on 5 L and wean as tolerated.      : Chest x-ray  shows bilateral airspace opacities increased along with mild pulmonary edema.  -proBNP elevated at 8000.  Start Lasix 20 mg IV twice daily.  Check 2D echocardiogram.     4/10: Has been spiking fever, will repeat Cx, SLP eval  Replete lytes   CXR in AM for f/up  If IV access lost then consult Anesthesia for central line vs EJ     : Cont to spike fever, check HIV, hepatitis panel, CRP, sedrate, PANCHO, RF . Consider Bronch  Will consult ID  Repeat Cx  Pulm recs thoracocentesis   Lost IV access, called anesthesia for central line  Updated brother over the phone      : No further fever, labs pending.  Reviewed ID recommendation, continue current 
      Hospitalist Progress Note    NAME:   Lillie Claire   : 1955   MRN: [id number]     Date/Time: 2024 3:08 PM  Patient PCP: Husam Foster APRN - NP    Estimated discharge date:   Barriers: Improvement of shortness of breath, anemia, GI clearance    Assessment / Plan:  Severe sepsis most likely pneumonia  Acute septic encephalopathy- in the setting of infection   Right lobe lung mass  Acute pulmonary edema    CT of the chest showed  IMPRESSION:  1. 5 cm x 4.9 cm masslike partial consolidation of the superior segment of the  right lower lobe. 2. Right paratracheal, subcarinal and right hilar  lymphadenopathy.  3. Bronchoscopy and/or CT PET scan can be performed for further evaluation, as  indicated.    -Blood cultures negative.  Respiratory viral panel negative including COVID-19  -Continue Zosyn.  Will add vancomycin due to fever of 101 and also continued hypoxia  -Evaluated by pulmonary and recommended repeating a CT scan in about 4 weeks for further evaluation of right lung mass  -Continue oxygen by nasal cannula, currently on 5 L and wean as tolerated.  -Chest x-ray on  shows bilateral airspace opacities increased along with mild pulmonary edema.  -proBNP elevated at 8000.  Start Lasix 20 mg IV twice daily.  Check 2D echocardiogram.        Hyponatremia multifactorial in etiology  -Sodium is currently improved.  Currently 133.  Check BMP in a.m.      Hypokalemia  -Potassium replaced.  Recheck in a.m.    Hypertension  -Will hold metoprolol given anemia, worsening pneumonia and due to risk of decompensation.  Resume as appropriate     Hyperlipidemia  Continue with Lipitor     Bipolar disease  Continue with Depakote, Remeron,  Valium  [facility]- pharmacy mentioned that the family could not bring the meds - hence on hold for now     Acute on chronic anemia   iron deficiency anemia  Melena  -No clear baseline hemoglobin available.  Presented with hemoglobin of 8.  Hemoglobin is even lower today 
     Left detailed voicemail for Lorne Claire, patient's HCPOA updating him on EGD findings.     CHANTELLE Ramírez  04/10/24  2:49 PM    
     Nutrition Note    Chart reviewed. Pt sleeping soundly at time of visit with no family present. Per documentation per PO intake remains quite poor, but is better with the supplements. Per notes, plan is still to discharge with hospice. RD will continue to follow peripherally to support as able. Aggressive nutrition support not indicated.     Electronically signed by Kirsten Douglas RD on 5/6/24 at 3:09 PM EDT    Contact: [phone]    
    1817: Responded to a Rapid Response on this patient for tachycardia. At this time, patient is alert and following all commands. /65. 's, RR 20, and oxygen saturation 95% on room air. Pt. Appears in no distress.     1825: Anesthesia remains at the bedside attempting IV access.     1838: EKG obtained. Anesthesia placed a 20G in the right arm via U/S.     1845: NS bolus now started per Dr. Mcpherson. TX to SD orders also received.     1900: Pt. Now transferred to 2326. Attempted to give IV metoprolol and found that patient's new PIV is infiltrated. Bolus stopped. Anesthesia now paged again.     1905: Anesthesia at the bedside calling family for consent to insert CVC.     1910: RRT nurse Sandi now assuming care of patient.       
  0400: Patient's Hb @ 10 pm was 7.1. Message to Ms. Diez to let her know.    End of Shift Note    Bedside shift change report given to  Lauryn VOGT (oncoming nurse) by Deysi Reddy RN (offgoing nurse).  Report included the following information SBAR, MAR, and Cardiac Rhythm sinus rhythm to sinus tachy    Shift worked:  7p-7.30a     Shift summary and any significant changes:     CBC check every 8 hrs.   Next blood draw for CBC would be 2pm   CVC triple lumen on rt  side   O2 continue @ 2 lit/min     Concerns for physician to address:       Zone phone for oncoming shift:          Activity:     Number times ambulated in hallways past shift: 0  Number of times OOB to chair past shift: 0    Cardiac:   Cardiac Monitoring: Yes           Access:  Current line(s): central line     Genitourinary:   Urinary status: external catheter    Respiratory:      Chronic home O2 use?: NO  Incentive spirometer at bedside: YES       GI:     Current diet:  ADULT DIET; Dysphagia - Soft and Bite Sized  ADULT ORAL NUTRITION SUPPLEMENT; Breakfast, Lunch; Standard High Calorie/High Protein Oral Supplement  Passing flatus: YES  Tolerating current diet: YES       Pain Management:   Patient states pain is manageable on current regimen: YES    Skin:     Interventions: turn team, specialty bed, float heels, PT/OT consult, internal/external urinary devices, and nutritional support    Patient Safety:  Fall Score:    Interventions: bed/chair alarm, assistive device (walker, cane. etc), gripper socks, and pt to call before getting OOB       Length of Stay:  Expected LOS: 13  Actual LOS: 10      Deysi Reddy RN                            
  [facility]  at [facility]  [address], [address] [address], [address]  [place] [id number] [phone]      Oncology Note        Patient: Lillie Claire MRN: [id number]  SSN: [id number]    YOB: 1955  Age: 68 y.o.  Sex: female      Subjective:      Lillie Claire is a 68 y.o. female who I am seeing for a new diagnosis of metastatic NSCLC. She is admitted with worsening dyspnea, weakness, anorexia and a persistent and worsening cough for a 1-2 months. CT shows RLL lung mass, RML lung collapse, right sided pleural effusion, adenopathy both above and below the diaphragm. A supraclavicular LN biopsy revealed a diagnosis of poorly differentiated carcinoma. She is nasal O2 and looks weak. She lives with her brother and sister-in-law.        Sister-in-law works for the [employer] and the brother works for [employer] @ night time.     Interval History:     4/23/2024  Patient seen at bedside.  At time of visit patient was only oriented to self.  Patient's brother Lorne is also present at the bedside, he reports that patient lives with him and his wife.  Reports since patient stroke 7 years ago this is her baseline cognition.  We had a very soniya and honest discussion regarding new diagnosis of malignancy.  Patient's brother shared concerns that she is unable to make complex medical decisions for herself or fully understand her new diagnosis.  There is concern that she would not be able to communicate symptoms from worsening malignancy or side effects from treatment.  Also, she is functionally very debilitated and would likely not tolerate treatment well.   At that time we discussed options for hospice and focusing on quality of life.  Brother verbalized understanding and agreement and was in agreement for hospice.  He is interested in placing patient in a facility as she is increasingly difficult to care for at home.    Review of 
  [facility]  at [facility]  [address], [facility] [address], [address]  [place] [address]  [phone]      Oncology Note        Patient: Lillie Claire MRN: [id number]  SSN: [id number]    YOB: 1955  Age: 68 y.o.  Sex: female      Subjective:      Lillie Claire is a 68 y.o. female who I am seeing for a new diagnosis of metastatic NSCLC. She is admitted with worsening dyspnea, weakness, anorexia and a persistent and worsening cough for a 1-2 months. CT shows RLL lung mass, RML lung collapse, right sided pleural effusion, adenopathy both above and below the diaphragm. A supraclavicular LN biopsy revealed a diagnosis of poorly differentiated carcinoma. She is nasal O2 and looks weak. She lives with her brother and sister-in-law.        Sister-in-law works for the [employer] and the brother works for [employer].     Review of Systems:  Constitutional: positive for anorexia, fatigue, fevers, and weight loss  Eyes: negative  Ears, Nose, Mouth, Throat, and Face: negative  Respiratory: positive for cough, dyspnea on exertion, pneumonia, shortness of breath, and sputum  Cardiovascular: negative  Gastrointestinal: negative  Genitourinary:negative  Integument/Breast: negative  Hematologic/Lymphatic: negative  Musculoskeletal:negative  Neurological: alert, moving all 4 extremites        Past Medical History:   Diagnosis Date    Anxiety     Bipolar 1 disorder (HCC)     CVA (cerebral vascular accident) (HCC)     Hypertension      Past Surgical History:   Procedure Laterality Date    CAPSULE ENDOSCOPY N/A 4/15/2024    ESOPHAGEAL CAPSULE ENDOSCOPY performed by Maximiliano Alberts MD at [facility] ENDOSCOPY    CAPSULE ENDOSCOPY N/A 4/17/2024    ESOPHAGEAL CAPSULE ENDOSCOPY [facility] [facility]    UPPER GASTROINTESTINAL ENDOSCOPY N/A 4/10/2024    ESOPHAGOGASTRODUODENOSCOPY performed by Maximiliano Alberts MD at [facility] 
  End of Shift Note    Bedside shift change report given to Tata MORA (oncoming nurse) by Deysi Reddy RN (offgoing nurse).  Report included the following information SBAR, MAR, and Cardiac Rhythm Normal sinus rhythm    Shift worked:  7p-7.30a     Shift summary and any significant changes:     O2 @ 3 lits / min   Every 2 hrs turn,          Concerns for physician to address:       Zone phone for oncoming shift:          Activity:     Number times ambulated in hallways past shift: 0  Number of times OOB to chair past shift: 0    Cardiac:   Cardiac Monitoring: Yes           Access:  Current line(s): PIV and central line     Genitourinary:   Urinary status: voiding and external catheter    Respiratory:      Chronic home O2 use?: NO  Incentive spirometer at bedside: YES       GI:     Current diet:  ADULT DIET; Easy to Chew; 4 carb choices (60 gm/meal); Low Fat/Low Chol/High Fiber/2 gm Na  ADULT ORAL NUTRITION SUPPLEMENT; Breakfast; Clear Liquid Oral Supplement  ADULT ORAL NUTRITION SUPPLEMENT; Lunch; Frozen Oral Supplement  Passing flatus: YES  Tolerating current diet: YES       Pain Management:   Patient states pain is manageable on current regimen: YES    Skin:     Interventions: turn team, specialty bed, float heels, increase time out of bed, PT/OT consult, internal/external urinary devices, and nutritional support    Patient Safety:  Fall Score:    Interventions: bed/chair alarm, assistive device (walker, cane. etc), gripper socks, and pt to call before getting OOB       Length of Stay:  Expected LOS: 22  Actual LOS: 20      Deysi Reddy RN                            
  ID  CT chest/abdomen/pelvis report reviewed at length.  FINDINGS:  Lung carcinoma: The right lower lobe mass superior segment was better seen on  4/3/2024. It is largely obscured by increased atelectasis today. Heterogeneous,  enlarged, mediastinal lymph nodes are again noted. The largest of these is a  subcarinal lymph node measuring 22 mm in short axis and 22 x 27 x 35 mm in 3  dimensions (versus 18 x 23 x 35 mm on 4/3/2024). Lung carcinoma and ethel  metastases are favored over pneumonia and reactive lymphadenopathy. Right  supraclavicular lymph nodes are asymmetric and would be easily amenable to  ultrasound-guided percutaneous biopsy. They are only mildly enlarged, however,  and smaller than other mediastinal lymph nodes.     Heterogeneous, enlarged, upper abdominal lymph nodes in the aortic hiatus,  suprapancreatic, gastrohepatic ligament, and left retroperitoneal regions are  likely also metastases. The largest of these is a left retroperitoneal node  anterior to the adrenal gland measuring 17 x 18 x 21 mm.     Chest: There is new collapse of the right middle lobe, with endobronchial mucous  plugging in the lobar bronchus. Atelectasis in the right lower lobe has  increased. There is mild dependent atelectasis in the left lower lobe. The left  lung and right upper lobe are otherwise clear. A small to moderate right pleural  effusion is new. No overt pleural nodularity to suggest malignant effusion. The  heart is enlarged. LAD and LCx coronary calcifications are moderate.     Abdomen: Vague hepatic hypodensity along the fissure for the falciform ligament  is in a good location for focal hepatic steatosis. See annotated key images. No  clear hepatic metastases. Incidental note is made of granulomatous hepatic  calcifications. There is an endoscopy capsule in the gastric cardia and  endoscopy clips in the proximal third portion of the duodenum. An IVC filter is  in appropriate position. The gallbladder is 
  Palliative Medicine  Per Dr. Fonseca: Lillie Claire is a 68 y.o. female with HTN, HLD, Bipolar disease, cognitive impairment related to previous CVA who was admitted on 2024 from home with a diagnosis of severe sepsis, acute encephalopathy, abnormal CT of the chest, pneumonia, hyponatremia. She is receiving antibiotics. Pulmonology was consulted--recommend repeat chest CT in 4-6 weeks after completion of antibiotics to re-assess the mass-like lesion. Nephrology was consulted for hyponatremia suspected d/t IVVD, poor PO intake. SLP attempted to evaluate patient but patient refused to participate.   4/3/2024 CT C/A/P:  IMPRESSION:  1. 5 cm x 4.9 cm masslike partial consolidation of the superior segment of the  right lower lobe. 2. Right paratracheal, subcarinal and right hilar  lymphadenopathy.  3. Bronchoscopy and/or CT PET scan can be performed for further evaluation, as  indicated.     Code Status: DNR (DDNR signed with Dr. Kay 24.)     [facility]/5/2024     1:05 PM   Demographics   Marital Status Single   No AMD on file. Patient has 1 son, Gianni, who as her legal nok would be her primary HCDM. He has deferred to her siblings and Lorne, who with his wife Orin have been caring for patient since her CVA 7 years ago, has been designated by all siblings as patient's primary HCDM. (See ACP note.)     Patient / Family Encounter Documentation     Participants (names): Lillie Claire, Lorne Claire, Dr. Kay, Irma Duckworth, [facility]     Narrative: Palliative team met with patient and brother briefly at bedside. Patient was alert with confusion and irritability. We met with Lorne in ICU conference room and reviewed her cancer diagnosis and prognosis. He expressed understanding that patient's cancer is stage IV and that she is not functioning well enough to benefit from any cancer treatment. He shared that his mother and his mother-in-law and some other family members have received 
0630 Lab called with critical results. Pt hgb 5.8. This nurse notified MORGAN Prater of critical result.  
0700- Bedside and Verbal shift change report given to Diana RN  (oncoming nurse) by Carl HORN RN (offgoing nurse). Report included the following information Nurse Handoff Report, Index, Intake/Output, MAR, and Recent Results.     0808- Shift assessment completed. Patient is taken to US for biopsy.     1000- Patient returned from biopsy.    0700- Bedside and Verbal shift change report given to Carl HORN RN  (oncoming nurse) by Diana RN (offgoing nurse). Report included the following information Nurse Handoff Report, Index, Intake/Output, MAR, and Recent Results.     
0700- Bedside and Verbal shift change report given to MORGAN Kuhn/Jakob Hadley (oncoming nurse) by MORGAN Bah (offgoing nurse). Report included the following information Nurse Handoff Report, Intake/Output, MAR, Recent Results, and Cardiac Rhythm NSR .     0730- Patient's hemoglobin is 6.9. Off going nurse murray served MD Dex to see if he wanted a repeat. Per MD wants a repeat.    1202- Repeat hemoglobin came back as 6.8, Murray Served MD Gregoria to make aware .    1326- MD Dex ordered blood transfusion and call LUISA Claire to obtain consent. MD completed blood transfusion consent electronically and is in notes.     1354- Per blood bank request, spoke with POA,  Lorne to see if patient has received blood before. Per Lorne [place] [facility] [place] Called down to blood bank and spoke with Мария and made her aware. Per blood bank waiting on [place] to verify antibody prior to starting blood transfusion.     End of Shift Note    Bedside shift change report given to MORGAN Bah (oncoming nurse) by Hattie Rodriguez RN,\/MORGAN Patton (offgoing nurse).  Report included the following information SBAR, MAR, Recent Results, and Cardiac Rhythm NSR    Shift worked:  8183-9885     Shift summary and any significant changes:     Patient on 5 L N/C, Hemoglobin 6.8 on repeat, MD Jack received consent,gave prn glycolax to help with constipation, patient had low appetite and did not each much for lunch and dinner.       Concerns for physician to address:  Waiting for [place] for blood transfusion, could not get occult stool sample as patient did not have bowel movement. PT [facility] phone for oncoming shift:   [phone]       Activity:     Number times ambulated in hallways past shift: 0  Number of times OOB to chair past shift: 0    Cardiac:   Cardiac Monitoring: Yes           Access:  Current line(s): PIV     Genitourinary:   Urinary status: voiding and external catheter    Respiratory:      Chronic home 
0700-Bedside and Verbal shift change report given to MORGAN Collazo (oncoming nurse) by MORGAN Lee (offgoing nurse). Report included the following information Nurse Handoff Report, ED SBAR, Intake/Output, MAR, and Recent Results.     End of Shift Note    Bedside shift change report given to MORGAN Arteaga (oncoming nurse) by ADOLFO SKINNER RN (offgoing nurse).  Report included the following information SBAR, Kardex, ED Summary, OR Summary, Procedure Summary, MAR, and Recent Results    Shift worked:  7a-7p     Shift summary and any significant changes:     Patient is still on 2 l/min, unable to wean O2 down. Central line placement today. Plan for Thoracentesis tomorrow.      Concerns for physician to address:       Zone phone for oncoming shift:          Activity:     Number times ambulated in hallways past shift: 0  Number of times OOB to chair past shift: 0    Cardiac:   Cardiac Monitoring: Yes           Access:  Current line(s): central line     Genitourinary:   Urinary status: incontinent    Respiratory:      Chronic home O2 use?: NO  Incentive spirometer at bedside: YES       GI:     Current diet:  ADULT DIET; Dysphagia - Soft and Bite Sized  ADULT ORAL NUTRITION SUPPLEMENT; Breakfast, Lunch; Standard High Calorie/High Protein Oral Supplement  Passing flatus: YES  Tolerating current diet: NO       Pain Management:   Patient states pain is manageable on current regimen: YES    Skin:     Interventions: float heels and nutritional support    Patient Safety:  Fall Score:    Interventions: bed/chair alarm and pt to call before getting OOB       Length of Stay:  Expected LOS: 13  Actual LOS: 8      ADOLFO SKINNER RN                            
0700: Bedside and Verbal shift change report given to MORGAN Damian (oncoming nurse) by MORGAN Sharp (offgoing nurse). Report included the following information Nurse Handoff Report, Index, Intake/Output, MAR, and Recent Results.     1017: Patient refused sucralfate, MD notified.    1230: Pt to be discharged to SNF, per MD Zosyn to be given prior to DC, RN called pharmacy to see if it could be given over a shorter amount of time. Per pharmacy can run over 30 min.     1405: Patients CVC removed.  
0705-Bedside and Verbal shift change report given to MORGAN Feliciano (oncoming nurse) by MORGAN Araya (offgoing nurse). Report included the following information Nurse Handoff Report, Index, Intake/Output, MAR, Recent Results, Cardiac Rhythm NSR, and Neuro Assessment.  Patient down to 1L NC. Temp 100.1 oral.  Removed socks and blanket.    0800-Per GI, patient to be seen by Anesthesia to see if she's appropriate for EGD today.    0845-Anesthesia (Dr. Liu), cleared patient for EGD today which will take place this afternoon per Endo MORGAN Pratt.    0900-Temp down to 99.7 oral.    1000-Mg (1.0) and K+ (3.2) replacement ordered.    1130-Bedside ECHO being performed.    1200-Patient provided a cup to provide a sputum culture as able.    1210-Patient bathed with CHG wipes.    1310-Patient to Endo for EGD.    1505-Received report from Endoscopy that the patient is in recover and can eat when she returns to her room.  Family was already updated on EGD findings.    1615-Patient back from Endoscopy.  Hook to overhead monitor, reassessed, purwick replaced.    1730-Patient agitated and tachycardic.  C/O pain in right arm.  IV assessed.  IV K+ seems to be hurting her arm.  Upon stopping IV K+ patient no longer restless or c/o pain.  Dr. Lopez at bedside said he would change IV K+ to PO route.    1830-Hbg and 1 blood culture set sent to lab.  Still trying to get 2nd set.    1900-Bedside and Verbal shift change report given to MORGAN Medeiros (oncoming nurse) by Feliciano RN (offgoing nurse). Report included the following information Nurse Handoff Report, Index, Intake/Output, MAR, Recent Results, Cardiac Rhythm NSR, and Neuro Assessment.   
0714- Bedside and Verbal shift change report given to MORGAN Kuhn/ Pooja Robert (oncoming nurse) by MORGAN Bah (offgoing nurse). Report included the following information Nurse Handoff Report, Adult Overview, Intake/Output, MAR, Recent Results, and Cardiac Rhythm NSR .      0730- off going nurse reports patients Hgb was 6.9. Notified MD Gregoria.    1202- Repeat Hgb 6.8. MD Gregoria notified.     1326- Transfusion orders placed by MD. Per MD spoke with Lorne MORAN to complete electronic blood transfusion consent.     1354- Per blood ban request, spoke with POA, Lorne to see if patient has previously received blood. [place] [facility] [place] Мария [place] blood bank.     
0730: Bedside and Verbal shift change report given to Guillermina Fuller RN (oncoming nurse) by Rosa Wilkins RN  (offgoing nurse). Report included the following information Nurse Handoff Report, Index, Adult Overview, Surgery Report, Intake/Output, MAR, Recent Results, Cardiac Rhythm NSR, Alarm Parameters, Quality Measures, and Neuro Assessment.     0800: Assessment completed. Pt repositioned in bed.     1000: Pt repositioned in bed.     1055: Bedside and Verbal shift change report given to Purnima Porter RN by Guillermina Fuller RN . Report included the following information Nurse Handoff Report, Index, Adult Overview, Surgery Report, Intake/Output, MAR, Recent Results, Cardiac Rhythm NSR, Alarm Parameters, Quality Measures, and Neuro Assessment.           
0735: Bedside shift change report given to MORGAN Brooks (oncoming nurse) by MORGAN Jo (offgoing nurse). Report included the following information Nurse Handoff Report.      1400: RIJ CVC removed. Catheter intact, 5 mins manual pressure held. No evidence of bleeding. Gauze and tegaderm dressing applied.    End of Shift Note    Bedside shift change report given to MORGAN Chou (oncoming nurse) by Tata Lemus RN (offgoing nurse).  Report included the following information SBAR, Intake/Output, Recent Results, and Cardiac Rhythm NSR    Shift worked:  7a-7p     Shift summary and any significant changes:    Pt rested comfortably in bed for majority of shift, continuing Q2 turns and encouraging PO intake. Pt is awaiting placement to hospice facility [facility].      Concerns for physician to address:       Zone phone for oncoming shift:          Activity:     Number times ambulated in hallways past shift: 0  Number of times OOB to chair past shift: 0    Cardiac:   Cardiac Monitoring: Yes           Access:  Current line(s): PIV     Genitourinary:   Urinary status: voiding, incontinent, and external catheter    Respiratory:      Chronic home O2 use?: YES  Incentive spirometer at bedside: YES       GI:     Current diet:  ADULT DIET; Easy to Chew; 4 carb choices (60 gm/meal); Low Fat/Low Chol/High Fiber/2 gm Na  ADULT ORAL NUTRITION SUPPLEMENT; Breakfast; Clear Liquid Oral Supplement  ADULT ORAL NUTRITION SUPPLEMENT; Lunch; Frozen Oral Supplement  DIET ONE TIME MESSAGE;  Passing flatus: YES  Tolerating current diet: NO - loss of appetite, encourage fluid intake       Pain Management:   Patient states pain is manageable on current regimen: YES    Skin:     Interventions: turn team, float heels, PT/OT consult, limit briefs, internal/external urinary devices, and nutritional support    Patient Safety:  Fall Score:    Interventions: bed/chair alarm and gripper socks       Length of Stay:  Expected LOS: 23  Actual LOS: 
0745: Bedside shift change report given to MORGAN Brooks (oncoming nurse) by MORGAN Jo (offgoing nurse). Report included the following information Nurse Handoff Report.     1000: Pt febrile, 100.6F, MD aware. PRN tylenol given.    1600: Blood cultures and urine culture obtained and sent to the lab.     End of Shift Note    Bedside shift change report given to MORGAN Jo (oncoming nurse) by Tata Lemus RN (offgoing nurse).  Report included the following information SBAR, Intake/Output, Recent Results, and Cardiac Rhythm NSR    Shift worked:  7a-7p     Shift summary and any significant changes:    Pt rested in bed for majority of shift, kept turned and dry. Pt febrile this am, blood cultures and urine culture sent. Palliative meeting with family tomorrow.      Concerns for physician to address:       Zone phone for oncoming shift:          Activity:     Number times ambulated in hallways past shift: 0  Number of times OOB to chair past shift: 0    Cardiac:   Cardiac Monitoring: Yes           Access:  Current line(s): PIV and central line     Genitourinary:   Urinary status: voiding, incontinent, and external catheter    Respiratory:      Chronic home O2 use?: NO  Incentive spirometer at bedside: YES       GI:     Current diet:  ADULT DIET; Easy to Chew; 4 carb choices (60 gm/meal); Low Fat/Low Chol/High Fiber/2 gm Na  ADULT ORAL NUTRITION SUPPLEMENT; Breakfast; Clear Liquid Oral Supplement  ADULT ORAL NUTRITION SUPPLEMENT; Lunch; Frozen Oral Supplement  Passing flatus: YES  Tolerating current diet: NO - reduced appetite, very poor PO intake       Pain Management:   Patient states pain is manageable on current regimen: YES    Skin:     Interventions: turn team, specialty bed, float heels, limit briefs, internal/external urinary devices, and nutritional support    Patient Safety:  Fall Score:    Interventions: bed/chair alarm and gripper socks       Length of Stay:  Expected LOS: 22  Actual LOS: 19      Tata 
0900- K repleted orally    0947- H &H collected and sent to the lab, currently on 2L NC, will titrate O2 down    1123- On RA, 02- 100%    1440- Perfected served MD, and discussed patient's Hg(7). Per MD, will recheck with morning labs and tranfuse if below 7    1530- handoff report given to MORGAN Naqvi  
1100 report received from Guillermina to include SBAR and MAR.     1130 medication administered, vitals taken, no complains or concerns from the pt at this time.      1400 pts brother called to speak with the drDonna Stewart messmichael and asked for a return call.    1445 pt resting peacefully in bed with eyes closed    1515 vitals taken, oxygen sensor changed back to sticky sensor from rubber sensor to establish a correct read.     1645 pt repositioned for dinner, medications passed, visitor bed side, vitals taken    1815 repositioned patent, changed pure wick, canister emptied    End of Shift Note    Bedside shift change report given to Allison (oncoming nurse) by SHERWIN MAHMOOD RN (offgoing nurse).  Report included the following information SBAR, Kardex, and MAR    Shift worked:  7a-7p     Shift summary and any significant changes:     No significant changes     Concerns for physician to address:       Zone phone for oncoming shift:          Activity:     Number times ambulated in hallways past shift: 0  Number of times OOB to chair past shift: 0    Cardiac:   Cardiac Monitoring: Yes           Access:  Current line(s): PIV and central line     Genitourinary:   Urinary status: voiding and external catheter    Respiratory:      Chronic home O2 use?: YES  Incentive spirometer at bedside: NO       GI:     Current diet:  ADULT DIET; Easy to Chew; 4 carb choices (60 gm/meal); Low Fat/Low Chol/High Fiber/2 gm Na  ADULT ORAL NUTRITION SUPPLEMENT; Breakfast; Clear Liquid Oral Supplement  ADULT ORAL NUTRITION SUPPLEMENT; Lunch; Frozen Oral Supplement  Passing flatus: YES  Tolerating current diet: YES       Pain Management:   Patient states pain is manageable on current regimen: N/A    Skin:     Interventions: specialty bed and internal/external urinary devices    Patient Safety:  Fall Score:    Interventions: bed/chair alarm and pt to call before getting OOB       Length of Stay:  Expected LOS: 20  Actual LOS: 16      SHERWIN MAHMOOD 
12:42; Lab reported two antibodies, and will need to request blood from [place].    1805: Called lab to check on blood acquisition. Staff indicated blood still needs to be tagged at this time.     
1310:   Discharge Summary     Patient: Lillie Claire MRN: [id number]  SSN: [id number]    YOB: 1955  Age: 68 y.o.  Sex: female       Code Status: DNR  Allergies: No Known Allergies    Admit Date: 4/2/2024    Discharge Date: 5/7/2024      Admission Diagnoses: Sepsis (HCC) [A41.9]    PMH:   Past Medical History:   Diagnosis Date    Anxiety     Bipolar 1 disorder (HCC)     CVA (cerebral vascular accident) (HCC)     Hypertension        Social History:  Social history   Social History     Tobacco Use    Smoking status: Every Day    Smokeless tobacco: Not on file   Substance Use Topics    Alcohol use: Yes     Drug History   Social History     Substance and Sexual Activity   Drug Use Not Currently     Familiy History History reviewed. No pertinent family history.    Consults:  Pulmonary    Significant Diagnostic Studies: microbiology: blood culture: pending, no growth so far and radiology: CXR:  R Lower lobe pneumonia  and CT scan: R lower lobe mass    Discharge Condition: Stable    Disposition: SNF    Discharge Medications:   Current Discharge Medication List        START taking these medications    Details   amoxicillin-clavulanate (AUGMENTIN) 875-125 MG per tablet Take 1 tablet by mouth 2 times daily for 7 days  Qty: 14 tablet, Refills: 0      furosemide (LASIX) 20 MG tablet Take 1 tablet by mouth in the morning and 1 tablet in the evening.  Qty: 60 tablet, Refills: 3      potassium chloride (K-TAB) 20 MEQ TBCR extended release tablet Take 2 tablets by mouth daily  Qty: 60 tablet, Refills: 0      pantoprazole (PROTONIX) 40 MG tablet Take 1 tablet by mouth 2 times daily (before meals)  Qty: 30 tablet, Refills: 0      sucralfate (CARAFATE) 1 GM tablet Take 1 tablet by mouth 4 times daily (before meals and nightly)  Qty: 120 tablet, Refills: 0           CONTINUE these medications which have CHANGED    Details   diazePAM (VALIUM) 2 MG tablet Take 0.5 tablets by mouth nightly for 3 days. Max Daily Amount: 1 
1900 Bedside and Verbal shift change report given to MORGAN Bah (oncoming nurse) by MORGAN Cano (offgoing nurse). Report included the following information Nurse Handoff Report, Index, Intake/Output, MAR, Recent Results, and Cardiac Rhythm NSR/ST .     End of Shift Note    Bedside shift change report given to MORGAN Ross (oncoming nurse) by Niurka Paul RN (offgoing nurse).  Report included the following information SBAR, Kardex, Intake/Output, MAR, Recent Results, and Cardiac Rhythm NSR    Shift worked:  1900 - 0700     Shift summary and any significant changes:     4-6L NC while sleeping. PRN Tylenol x1 for T 100.8 at start of shift; remained afebrile remainder of night.     Concerns for physician to address:  HGB 6.9; K 3.2     Zone phone for oncoming shift:          Activity:     Number times ambulated in hallways past shift: 0  Number of times OOB to chair past shift: 0    Cardiac:   Cardiac Monitoring: Yes           Access:  Current line(s): PIV     Genitourinary:   Urinary status: voiding and external catheter    Respiratory:      Chronic home O2 use?: YES  Incentive spirometer at bedside: YES       GI:     Current diet:  ADULT DIET; Regular  Passing flatus: YES  Tolerating current diet: YES       Pain Management:   Patient states pain is manageable on current regimen: YES    Skin:     Interventions: float heels, increase time out of bed, PT/OT consult, internal/external urinary devices, and nutritional support    Patient Safety:  Fall Score:    Interventions: bed/chair alarm, assistive device (walker, cane. etc), gripper socks, pt to call before getting OOB, and stay with me (per policy)       Length of Stay:  Expected LOS: 4  Actual LOS: 3      Niurka Paul RN                            
1900 Bedside and Verbal shift change report given to MORGAN Bah (oncoming nurse) by MORGAN Collazo (offgoing nurse). Report included the following information Nurse Handoff Report, Index, Intake/Output, MAR, Recent Results, and Cardiac Rhythm NSR/ST .     MORGAN Gomes will be charting on this pt.     I have reviewed and agree with all charting by MORGAN Gomes.     End of Shift Note    Bedside shift change report given to MORGAN Combs (oncoming nurse) by Niurka Paul RN (offgoing nurse).  Report included the following information SBAR, Kardex, Intake/Output, MAR, Recent Results, and Cardiac Rhythm NSR    Shift worked:  1900 - 0700     Shift summary and any significant changes:     Pt remains on 5-6L NC. HGB down to 7.5, no obvious s/sx of bleeding. PRN Tylenol x1.     Concerns for physician to address:  GI consult?     Zone phone for oncoming shift:          Activity:     Number times ambulated in hallways past shift: 0  Number of times OOB to chair past shift: 0    Cardiac:   Cardiac Monitoring: Yes           Access:  Current line(s): PIV     Genitourinary:   Urinary status: incontinent and external catheter    Respiratory:      Chronic home O2 use?: YES  Incentive spirometer at bedside: YES       GI:     Current diet:  ADULT ORAL NUTRITION SUPPLEMENT; Breakfast, Lunch; Standard High Calorie/High Protein Oral Supplement  ADULT DIET; Dysphagia - Soft and Bite Sized  Passing flatus: YES  Tolerating current diet: YES       Pain Management:   Patient states pain is manageable on current regimen: YES    Skin:     Interventions: specialty bed, float heels, increase time out of bed, PT/OT consult, limit briefs, internal/external urinary devices, and nutritional support    Patient Safety:  Fall Score:    Interventions: bed/chair alarm, assistive device (walker, cane. etc), gripper socks, pt to call before getting OOB, and stay with me (per policy)       Length of Stay:  Expected LOS: 5  Actual LOS: 5      Niurka Paul 
1900 Bedside and Verbal shift change report given to MORGAN Bah (oncoming nurse) by MORGAN Ross (offgoing nurse). Report included the following information Nurse Handoff Report, Index, Intake/Output, MAR, Recent Results, and Cardiac Rhythm NSR .     PRBC's x1 pending - awaiting cross match from [facility].    2140 PIV in LUE infiltrated. New access established in anticipation of pending blood transfusion.      2144 Spoke with blood bank and confirmed that blood is still pending - additional units had to be re-cross matched and may take a few more hours. Blood bank will call the unit when it's ready.     0021 PRBC's ready and started.     0330 PRBC's completed - no suspicion of transfusion reaction.     0400 PIV in LUE found to be removed. Pt cleaned and linen changed.     0447 Attempted to place PIV x2 while attempting to recheck HGB/collect AM labs. Pt currently refusing to allow another stick at this time - will reattempt prior to dayshift. Notified MARILIA Diez.     6526 Pt agreeable to be stuck for labs once more - once again unsuccessful.    End of Shift Note    Bedside shift change report given to MORGAN Collazo and MORGAN Royal (oncoming nurse) by Niurka Paul RN (offgoing nurse).  Report included the following information SBAR, Kardex, Intake/Output, MAR, Recent Results, and Cardiac Rhythm NSR    Shift worked:  1900 - 0700     Shift summary and any significant changes:     See above. Remains on 4-6L NC while sleeping. Pt had one episode where she desaturated to the low 80's, but recovered after a few minutes as she was encouraged to cough. Dayshift aware of pending labs.      Concerns for physician to address:  See above.      Zone phone for oncoming shift:          Activity:     Number times ambulated in hallways past shift: 0  Number of times OOB to chair past shift: 0    Cardiac:   Cardiac Monitoring: Yes           Access:  Current line(s): PICC     Genitourinary:   Urinary status: voiding and external 
1900: Bedside report received from MORGAN Combs  [id number]: Ultrasound guided IV placed in Left AC. Brown port on central line is leaking, no blood return noted. Contacted provider for further instructions, portable chest xray ordered    0136: Followup with NP about CXR for central line. Received order to D/c central line and day shift can reassess needing replacement line placed    0230: attempted to flush PIV to connect KVO fluids, patient grimaces and complains of pain at the site. Attempted to gain another PIV using Ultrasound x2 without success. Able to get enough blood return on last attempt and sent labs before removing IV. Reached out to provider for further guidance, firm on central line removal at this time. PICC team will have to try in AM. 1 additional nurse attempted US PIV, pt screamed and said \"I'm tired of this shit\". No more attempts made at this time d/t increased agitation.  Central line removed, small knick noted approximately 1cm from base of bifurcation on medial port.Hemostasis obtained after 5 minutes of handheld pressure, gauze and tegaderm placed over site.    0335: Lab called for re-collect on CBC, Pt refusing sticks at this time. Will pass along to day team    0600: Pt turned to right side. Pad underneath clean and dry    0700: Bedside report given to MORGAN Feliciano    
2000-Alerted by [facility]U nurse that patient was sustaining between 110-135, assessed patient and received PRN metoprolol 5mg IVP.  Medication was not effective and converted into A-fib w/RVR.    2042-RRT called, EKG was completed and confirmed rhythm, another dose of Metoprolol 5mg IVP was given  Labs obtained and patient started on Dilt gtt. Hbg returned at 5.6, K+ 2.9. Dr. TASHA Goldman and Angely RN paid nurse at the bedside    2117-1st unit of PRBCs started    2200-Received new orders to replace patients K+ from Dr. Diallo    0330-follow up Hbg 6.7 and K+3.6    0500-Dr. Goldman rounded on patient again, stated it was okay to stop Dilt gtt since patient had converted into sinus rhythm.Per Molly in CMU she converted around 2215.      0615-2nd unit of PRBCs started, patient VSS  are stable at this time.            
4 Eyes Skin Assessment     NAME:  Lillie Claire  YOB: 1955  MEDICAL RECORD NUMBER:  [id number]    The patient is being assessed for  Transfer to New Unit    I agree that at least one RN has performed a thorough Head to Toe Skin Assessment on the patient. ALL assessment sites listed below have been assessed.      Areas assessed by both nurses:    Head, Face, Ears, Shoulders, Back, Chest, Arms, Elbows, Hands, Sacrum. Buttock, Coccyx, Ischium, Legs. Feet and Heels, and Under Medical Devices         Does the Patient have a Wound? No noted wound(s)       Bandar Prevention initiated by RN: No  Wound Care Orders initiated by RN: No    Pressure Injury (Stage 3,4, Unstageable, DTI, NWPT, and Complex wounds) if present, place Wound referral order by RN under : No    New Ostomies, if present place, Ostomy referral order under : No     Nurse 1 eSignature: Electronically signed by Merlin Lang, RN on 4/3/24 at 6:43 PM EDT    **SHARE this note so that the co-signing nurse can place an eSignature**    Nurse 2 eSignature: {Esignature:[id number]}   
4/4/24 12:21 AM   [phone] Hospital or Facility: [facility] From: [facility] RE: Lillie Claire 1955 RM: [id number] Patient admitted with sepsis, AMS and fall at home. Her current blood work shows sodium of 132 and potassium3.3, Any recommendations please? Need Callback: NO CALLBACK REQ FLOAT POOL  Read 12:27 AM   4/4/24 12:23 AM   currently on continuous fluids of normal saline at 75ml/hr , her blood pressure was soft so I did not administer metoprolol  Read 12:27 AM   4/4/24 12:29 AM  Thank you for holding [facility]. IVF stopped and oral potassium ordered  4/4/24 12:46 AM   thank you  Read 12:46 AM   4/4/24 3:21 AM   patient has temperature is 102.8 just gave Tylenol as i see blood cultures already done HR is at 110 and respiration ranging from 24 to 35 per min. patient c/o feeling cold . please would like to give any recommendation or any blood work.  Read 3:22 AM   4/4/24 3:23 AM  No. Given sodium we can’t give any more fluids. And bloodwork was already completed.  4/4/24 3:23 AM   okay, thank you  Read 3:24 AM   4/4/24 3:24 AM   Will re check temp in an hour  Read 3:24 AM   4/4/24 3:24 AM  Ty      
4/4/24 3:21 AM   patient has temperature is 102.8 just gave Tylenol as i see blood cultures already done HR is at 110 and respiration ranging from 24 to 35 per min. patient c/o feeling cold . please would like to give any recommendation or any blood work.  Read 3:22 AM   4/4/24 3:23 AM  No. Given sodium we can’t give any more fluids. And bloodwork was already completed.  4/4/24 3:23 AM   okay, thank you  Read 3:24 AM   4/4/24 3:24 AM   Will re check temp in an hour  Read 3:24 AM   4/4/24 3:24 AM  Ty  4/4/24 6:24 AM   Current blood work show potassium 3.2 and sodium remain 132. Magnesium 1.3 and calcium serum is 8.8. Current vital sign BP is 85/55, HR 92, 07% on 2 liters of oxygen via nasal cannula. , RR 28 and temp is 98.1. Any recommendation please?  Read 6:34 AM   4/4/24 6:34 AM  Day team will adjust  
Attempted to schedule hospital follow up PCP appointment. Office  will contact the patient with appointment information per office protocol. Pending patient discharge. Mary Calhoun, Care Management Assistant  
Attempted to see pt for PT tx. Currently off the floor to ENDO. Will defer and continue to follow.   
Attempted to see pt for PT tx. Pt minimally interactive (behavioral rather than lethargy) and when attempting to remove covers to initiate mobility pt began to yell that she was \"cold and to put covers back on!!\" When attempting to explain and encourage the importance of mobility pt closed her eyes and refused to answer. Will continue to attempt mobility, but pt needs to be a willing participant.   
Bandar Score 13. All of the following interventions have been implemented to prevent pressure injury:    SKIN ASSESSMENT (S)  Dual skin assessment completed at shift change: Yes  Name of second RN who completed Dual Skin Assessment: MORGAN Sharp  Picture of wound uploaded to EMR: NA  Wound added as LDA in Avatar: NA  Venelex ordered for stage 1 or greater pressure injuries ONLY: NA  Wound care consulted if wounds present: NA    SURFACE (S)  Rukhsana air pump or specialty bed ordered: Yes  Type of bed: Rukhsana   Only white chux used with specialty surfaces (no green chux or bed alarm pads): Yes  Waffle cushion used for chair positioning: NA    KEEP MOVING (K)  Mobility status (Bedrest, Chairbound, UWA x 1 assist , 2 assist, Max assist): Bedrest  Q2 turn sheet placed on outside of door and initialed appropriately:Yes  Q2 hour turns documented in flowsheets: Yes  Refusals to turn and education provided documented: NA  Device used to float heels: Pillows  PT/OT consulted: No    INCONTINENCE (I)  Incontinence status assessed Q2 hours: Yes  External catheter in use: Purewick  Barrier cream in use: N/A    NUTRITION (N)  I/O's documented every 8 hours: Yes  Oral supplements ordered if appropriate: Yes  Nutrition services consulted: Yes    All concerns about new DTI's must be escalated immediately to attending MD, charge nurse, CCL and nurse director.       
Bandar Score 14. All of the following interventions have been implemented to prevent pressure injury:    SKIN ASSESSMENT (S)  Dual skin assessment completed at shift change: Yes  Name of second RN who completed Dual Skin Assessment: Tasha MORA  Picture of wound uploaded to EMR: NA  Wound added as LDA in Avatar: NA  Venelex ordered for stage 1 or greater pressure injuries ONLY: NA  Wound care consulted if wounds present: NA    SURFACE (S)  [place] air pump or specialty bed ordered: Yes  Type of bed: styker  Only white chux used with specialty surfaces (no green chux or bed alarm pads): Yes  Waffle cushion used for chair positioning: NA    KEEP MOVING (K)  Mobility status (Bedrest, Chairbound, UWA x 1 assist , 2 assist, Max assist): bedrest  Q2 turn sheet placed on outside of door and initialed appropriately:Yes  Q2 hour turns documented in flowsheets: Yes  Refusals to turn and education provided documented: NA  Device used to float heels: yes  PT/OT consulted: Yes    INCONTINENCE (I)  Incontinence status assessed Q2 hours: Yes  External catheter in use: yes  Barrier cream in use: yes    NUTRITION (N)  I/O's documented every 8 hours: Yes  Oral supplements ordered if appropriate: Yes  Nutrition services consulted: {YES/NO:19732}    All concerns about new DTI's must be escalated immediately to attending MD, charge nurse, CCL and nurse director.       
Bandar Score 14. All of the following interventions have been implemented to prevent pressure injury:    SKIN ASSESSMENT (S)  Dual skin assessment completed at shift change: Yes  Name of second RN who completed Dual Skin Assessment: Tasha MORA  Picture of wound uploaded to EMR: {YES/NO:19732}  Wound added as LDA in Avatar: {YES/NO:19732}  Venelex ordered for stage 1 or greater pressure injuries ONLY: {YES/NO:19732}  Wound care consulted if wounds present: {YES/NO:19732}    SURFACE (S)  Rukhsana air pump or specialty bed ordered: {YES/NO:19732}  Type of bed: ***  Only white chux used with specialty surfaces (no green chux or bed alarm pads): {YES/NO:19732}  Waffle cushion used for chair positioning: {YES/NO:19732}    KEEP MOVING (K)  Mobility status (Bedrest, Chairbound, UWA x 1 assist , 2 assist, Max assist): ***  Q2 turn sheet placed on outside of door and initialed appropriately:{YES/NO:19732}  Q2 hour turns documented in flowsheets: {YES/NO:19732}  Refusals to turn and education provided documented: {YES/NO:19732}  Device used to float heels: ***  PT/OT consulted: {YES/NO:19732}    INCONTINENCE (I)  Incontinence status assessed Q2 hours: {YES/NO:19732}  External catheter in use: ***  Barrier cream in use: ***    NUTRITION (N)  I/O's documented every 8 hours: {YES/NO:19732}  Oral supplements ordered if appropriate: {YES/NO:19732}  Nutrition services consulted: {YES/NO:19732}    All concerns about new DTI's must be escalated immediately to attending MD, charge nurse, CCL and nurse director.       
Bandar Score 14. All of the following interventions have been implemented to prevent pressure injury:    SKIN ASSESSMENT (S)  Dual skin assessment completed at shift change: {YES/NO:19732}  Name of second RN who completed Dual Skin Assessment: ***  Picture of wound uploaded to EMR: {YES/NO:19732}  Wound added as LDA in Avatar: {YES/NO:19732}  Venelex ordered for stage 1 or greater pressure injuries ONLY: {YES/NO:19732}  Wound care consulted if wounds present: {YES/NO:19732}    SURFACE (S)  Rukhsana air pump or specialty bed ordered: {YES/NO:19732}  Type of bed: ***  Only white chux used with specialty surfaces (no green chux or bed alarm pads): {YES/NO:19732}  Waffle cushion used for chair positioning: {YES/NO:19732}    KEEP MOVING (K)  Mobility status (Bedrest, Chairbound, UWA x 1 assist , 2 assist, Max assist): ***  Q2 turn sheet placed on outside of door and initialed appropriately:{YES/NO:19732}  Q2 hour turns documented in flowsheets: {YES/NO:19732}  Refusals to turn and education provided documented: {YES/NO:19732}  Device used to float heels: ***  PT/OT consulted: {YES/NO:19732}    INCONTINENCE (I)  Incontinence status assessed Q2 hours: {YES/NO:19732}  External catheter in use: ***  Barrier cream in use: ***    NUTRITION (N)  I/O's documented every 8 hours: {YES/NO:19732}  Oral supplements ordered if appropriate: {YES/NO:19732}  Nutrition services consulted: {YES/NO:19732}    All concerns about new DTI's must be escalated immediately to attending MD, charge nurse, CCL and nurse director.      
Bedside and Verbal shift change report given to Elba MORA (oncoming nurse) by Tasha MORA (offgoing nurse). Report included the following information Nurse Handoff Report, MAR, and Cardiac Rhythm NSR .     2225: This RN called RT for breathing treatment due to patient expiratory wheezing. RT to patient bedside, no treatment given.     0510: Hygiene care completed. Linens changed, hien care completed, purewick and brief changed.     0650: Morning blood work drawn and sent to lab    End of Shift Note    Bedside shift change report given to Gil MORA(oncoming nurse) by Elba Forbes RN (offgoing nurse).  Report included the following information SBAR, MAR, Recent Results, and Cardiac Rhythm NSR/ sinus tachy    Shift worked:  0120-1432     Shift summary and any significant changes:     See above     Concerns for physician to address:    Zone phone for oncoming shift:       Activity:     Number times ambulated in hallways past shift: 0  Number of times OOB to chair past shift: 0    Cardiac:   Cardiac Monitoring: Yes           Access:  Current line(s): central line     Genitourinary:   Urinary status: voiding and external catheter    Respiratory:      Chronic home O2 use?: NO  Incentive spirometer at bedside: YES       GI:     Current diet:  ADULT ORAL NUTRITION SUPPLEMENT; Breakfast; Clear Liquid Oral Supplement  ADULT ORAL NUTRITION SUPPLEMENT; Lunch; Frozen Oral Supplement  ADULT ORAL NUTRITION SUPPLEMENT; Dinner; Standard High Calorie/High Protein Oral Supplement  ADULT DIET; Easy to Chew; LIKES: ice tea (no ice), greens, tuna salad, fish  Passing flatus: YES  Tolerating current diet: YES       Pain Management:   Patient states pain is manageable on current regimen: YES    Skin:     Interventions: turn team, specialty bed, float heels, increase time out of bed, PT/OT consult, internal/external urinary devices, and nutritional support    Patient Safety:  Fall Score:    Interventions: bed/chair alarm, assistive device 
Bedside and Verbal shift change report given to Elba MORA (oncoming nurse) by Tasha RN (offgoing nurse). Report included the following information Nurse Handoff Report, MAR, Recent Results, and Cardiac Rhythm NSR .     2140: Vanc trough sent to lab prior to administration of IV vanc dose    0020: Patient HR sustaining 130s, going between regular and irregular rhythms. This RN messaging NP when HR jumped to 200s very briefly and went back down. This RN called rapid response. NP, RT, and additional Rns to patient bedside. Patient sustaining in 150s. EKG completed, labs drawn, POC blood glucose 154, dilt bolus administered and dilt gtt initiated. See MAR.     0051: One time dose furosemide administered. Chest xray completed at patient bedside.     RT to patient bedside for bipap initiation. Patient unable to tolerate and pulled mask off. Bipap aborted.     0139: One time dose IV lorazepam administered for anxiety    0150: Patient converted back to sinus tachy/ NSR rhythm.     0202: NP notified with interventions completed and patient status.      0220: Received single order for blood cultures. Patient has IJ for limited peripheral access. Requested clarification if intended test is for a peripheral site or to test IJ.      0235: One time dose magnesium initiated. Incentive spirometer provided and teaching provided. Needs reinforcement, no current evidence of understanding.      0255: One failed attempt at PIV    0300: This RN called RT for stat ABG and to retry bipap per NP, administration of one time lorazepam mentioned    0303: One time potassium hung and blood culture completed by charge RN.    0453: Bipap order discontinued per NP    0455: This RN called RT for stat ABG and notified RT of bipap order discontinued    0653: AM protonix and sucralfate held due to patient drowsiness. Day shift RN notified.     End of Shift Note    Bedside shift change report given to Tasha MORA (oncoming nurse) by Elba Forbes RN 
Bedside and Verbal shift change report given to Vivian by MORGAN Bah(offgoing nurse). Report included the following information Nurse Handoff Report, ED SBAR, Intake/Output, MAR, Recent Results, and Cardiac Rhythm   .   10AM patient had an episode of loose motion which was large,stool culture sample taken and presented to lab for analysis,    11am ,had a loose motion again, patient was cleaned and settled well in bed,still desturating on and off to 80s although later picked up within  ashort period of time    1500hrs Has difficult peripheral vascular access although has one on the left arm which is active    End of Shift Note    Bedside shift change report given to Niurka MORA(oncoming nurse) by Vale Matthews RN (offgoing nurse).  Report included the following information SBAR, Intake/Output, MAR, Recent Results, and Cardiac Rhythm      Shift worked:  2233-1675 hrs   Shift summary and any significant changes:    Patient still on 5L/min nc. Patient has a large loose stool during this shift. Occult stool is positive. H&H 9.6. MD Christian notified of patient loose stool, however it did not meet a criteria for c-diff. Per MINOR Christian is patient has more loose stool, needs to be sent to rule out.    Concerns for physician to address:  Has a poor appetite,plus plus has a productive cough     [facility]   [phone]       Activity:     Number times ambulated in hallways past shift: 0  Number of times OOB to chair past shift: 0    Cardiac:   Cardiac Monitoring: Yes           Access:  Current line(s): PIV     Genitourinary:   Urinary status: voiding and external catheter    Respiratory:      Chronic home O2 use?: NO  Incentive spirometer at bedside: YES       GI:     Current diet:  ADULT ORAL NUTRITION SUPPLEMENT; Breakfast, Lunch; Standard High Calorie/High Protein Oral Supplement  ADULT DIET; Dysphagia - Soft and Bite Sized  Passing flatus: YES  Tolerating current diet: YES   
Bedside and Verbal shift change report received from MORGAN Fay(offgoing nurse). Report included the following information Nurse Handoff Report, Index, ED Encounter Summary, ED SBAR, Adult Overview, Intake/Output, MAR, Recent Results, Med Rec Status, Cardiac Rhythm sinus rhythm, Alarm Parameters, Quality Measures, and Neuro Assessment. She is resting with her eye closed.   0900:She refused her breakfast; I fed her the banana, and she spit it out.  1200:Full bath completed with linen changes; she tolerated it well.   1330:She drank the milk, but refused the food.  1630:Lorne, her brother, and his wife assisted her with her meal. She ate 75% without c/o nausea or regurgitation.  1730:Noted with a productive cough.   1930: Bedside and Verbal shift change report given to Kirsten VEGA (oncoming nurse) by myself (offgoing nurse). Report included the following information Nurse Handoff Report, Index, ED Encounter Summary, ED SBAR, Adult Overview, Intake/Output, MAR, Recent Results, Med Rec Status, Cardiac Rhythm sinus rhythm, Alarm Parameters, Quality Measures, and Neuro Assessment.                      
Bedside and Verbal shift change report received from MORGAN Guan(offgoing nurse). Report included the following information Nurse Handoff Report, Index, ED Encounter Summary, ED SBAR, Adult Overview, Intake/Output, MAR, Recent Results, Med Rec Status, Cardiac Rhythm sinus tachycardia with 1st degree AV block, Alarm Parameters, Quality Measures, and Neuro Assessment. Noted with a congested cough, however, no respiratory distress or facial grimaces.   0900:Transferred OOB with a two man assistance, her gait is slow and steady. Incontinent of a large amount of tarry soft stool.  1100:She became very agitated and impulsive while in the chair, therefore assisted back to bed.   1200:Calmer, repositioned for comfort.   1400:She refused lunch.  1600:Incontinent, however, pleasant and cooperative with turning from side to side.  1800:She accepted a couple of bites of the carrots and beef for dinner. No c/o nausea or regurgitation.  1930: Bedside and Verbal shift change report given to MORGAN Araya (oncoming nurse)  (offgoing nurse). Report included the following information Nurse Handoff Report, Index, ED Encounter Summary, ED SBAR, Adult Overview, Intake/Output, MAR, Recent Results, Med Rec Status, Cardiac Rhythm sinus rhythm, Alarm Parameters, Quality Measures, and Neuro Assessment.                           
Bedside shift change report given to MORGAN Wood (oncoming nurse) by MORGAN Carlisle (offgoing nurse). Report included the following information Nurse Handoff Report, Recent Results, Cardiac Rhythm NSR to sinus tachycardia, and Quality Measures.      1011--Per Dr. Marquez with nephrology, hold Lasix.    1135--Blood transfusion started. MD and this nurse both called to get consent.     1425--Blood transfusion completed. Vital signs stable.     End of Shift Note    Bedside shift change report given to MORGAN Haas (oncoming nurse) by Nina Herrera RN (offgoing nurse).  Report included the following information SBAR, Kardex, and Cardiac Rhythm NSR to sinus tachycardia    Shift worked:  Day shift     Shift summary and any significant changes:     1 unit of blood transfused. Patient is a very hard stick and multiple attempts were made to get labs and a second IV access. IV fluids had to be delayed while blood transfused (nephrology made aware).   ER tech placed an IV, but IV blew before it could be used.   Per PICC team, no veins good enough for IV access.  Speech consult placed per MD as patient has been coughing more with drinking water and swallowing pills. Patient still not wanting PO intake besides water.   Patient had 2 GI bleeds today--MD made aware.   Camera pill study in progress.    Concerns for physician to address:  --Still coughing up thick white phlegm  --GI bleeds   Zone phone for oncoming shift:          Activity:     Number times ambulated in hallways past shift: 0  Number of times OOB to chair past shift: 0    Cardiac:   Cardiac Monitoring: Yes           Access:  Current line(s): PIV     Genitourinary:   Urinary status: voiding and external catheter    Respiratory:      Chronic home O2 use?: NO  Incentive spirometer at bedside: YES       GI:     Current diet:  ADULT DIET; Easy to Chew  Passing flatus: YES  Tolerating current diet: YES       Pain Management:   Patient states pain is manageable on current 
Bedside shift change report given to MORGAN Wood and MORGAN Curtis (oncoming nurse) by MORGAN Haas (offgoing nurse). Report included the following information Nurse Handoff Report, Recent Results, Cardiac Rhythm NSR, and Quality Measures.      0720--MORGAN Curtis will be charting on this patient.    2020--This RN agrees with MORGAN Curtis's charting.     End of Shift Note    Bedside shift change report given to MORGAN Lee (oncoming nurse) by Nina Herrera RN  and MORGAN Curtis (offgoing nurse).  Report included the following information SBAR, Kardex, Recent Results, and Cardiac Rhythm NSR to sinus tachycardia    Shift worked:  Day shift     Shift summary and any significant changes:     Patient to have EGD tomorrow.   1 unit of blood transfused--repeat Hgb 9.4.   Central line pending placement. IR was consulted, and per IR, they cannot place central lines, so MD consulted anesthesia.   Anesthesia would not come up to floor to place line and wanted this nurse to get consent for patient going down to OR. Per hospitalist, the MD doing the procedure is to do consent. Per OR team, hospitalist is able to do consent. Escalated this to unit director and charge nurse. Patient still has an IV for now that has sluggish blood return, but per PICC team, really needs a central line for lab draws and any fluids given (see note from yesterday from PICC team).  Chest CT done. Patient had 2 tarry stools this shift.    Concerns for physician to address:  --Continues to have fevers off and on  --Tachycardia  --Still needs central line placed  --Family has questions about lung mass and test results  --Oncology consult?  --CT scan results (night nurse to contact nocturnist about results that came back)   Zone phone for oncoming shift:   [phone]       Activity:     Number times ambulated in hallways past shift: 0  Number of times OOB to chair past shift: 0    Cardiac:   Cardiac Monitoring: Yes           Access:  Current line(s): PIV     Genitourinary: 
Bedside shift change report given to Rosa MORA (oncoming nurse) by Diana MORA (offgoing nurse). Report included the following information Nurse Handoff Report, Index, Intake/Output, MAR, Recent Results, Cardiac Rhythm  , Neuro Assessment, and Event Log.        2100: Visited by brother and sis in law Orin Claire who left her number in case [place] cannot be reached  - #[phone] or [phone]      End of Shift Note    Bedside shift change report given to Guillermina MORA (oncoming nurse) by Rosa Wilkins RN (offgoing nurse).  Report included the following information SBAR, Kardex, Intake/Output, MAR, Recent Results, and Cardiac Rhythm      Shift worked:  7pm-7am     Shift summary and any significant changes:     O2 remains @3L via nasal cannula, lab works done. Na 131, hospitalist informed     Concerns for physician to address:  xxx     Zone phone for oncoming shift:   xxx       Activity:     Number times ambulated in hallways past shift: 0  Number of times OOB to chair past shift: 0    Cardiac:   Cardiac Monitoring: Yes           Access:  Current line(s): PIV and central line     Genitourinary:   Urinary status: voiding and external catheter    Respiratory:      Chronic home O2 use?: NO  Incentive spirometer at bedside: YES       GI:     Current diet:  Diet NPO Exceptions are: Sips of Water with Meds  Passing flatus: YES  Tolerating current diet: YES       Pain Management:   Patient states pain is manageable on current regimen: YES    Skin:     Interventions: turn team, specialty bed, float heels, increase time out of bed, PT/OT consult, limit briefs, internal/external urinary devices, and nutritional support    Patient Safety:  Fall Score:    Interventions: bed/chair alarm, assistive device (walker, cane. etc), gripper socks, pt to call before getting OOB, and stay with me (per policy)       Length of Stay:  Expected LOS: 17  Actual LOS: 15      Rosa Wilkins RN                           
Bedside shift change report given to Rosa MORA (oncoming nurse) by Faye RN (offgoing nurse). Report included the following information Nurse Handoff Report, Index, Intake/Output, MAR, Recent Results, Cardiac Rhythm  , Neuro Assessment, and Event Log.          End of Shift Note    Bedside shift change report given to Faye RN (oncoming nurse) by Rosa Wilkins RN (offgoing nurse).  Report included the following information SBAR, Kardex, Intake/Output, MAR, Recent Results, Cardiac Rhythm  , and Alarm Parameters     Shift worked:  7pm-7am     Shift summary and any significant changes:     Remains on O2 3L via nasal cannula, labworks done,     Concerns for physician to address:  xxx     Zone phone for oncoming shift:   xxx       Activity:     Number times ambulated in hallways past shift: 0  Number of times OOB to chair past shift: 0    Cardiac:   Cardiac Monitoring: Yes           Access:  Current line(s): PIV and central line     Genitourinary:   Urinary status: external catheter    Respiratory:      Chronic home O2 use?: NO  Incentive spirometer at bedside: YES       GI:     Current diet:  ADULT DIET; Easy to Chew; 4 carb choices (60 gm/meal); Low Fat/Low Chol/High Fiber/2 gm Na  ADULT ORAL NUTRITION SUPPLEMENT; Breakfast; Clear Liquid Oral Supplement  ADULT ORAL NUTRITION SUPPLEMENT; Lunch; Frozen Oral Supplement  Passing flatus: YES  Tolerating current diet: YES       Pain Management:   Patient states pain is manageable on current regimen: YES    Skin:     Interventions: turn team, specialty bed, float heels, increase time out of bed, PT/OT consult, limit briefs, internal/external urinary devices, and nutritional support    Patient Safety:  Fall Score:    Interventions: bed/chair alarm, assistive device (walker, cane. etc), gripper socks, pt to call before getting OOB, and stay with me (per policy)       Length of Stay:  Expected LOS: 20  Actual LOS: 18      Rosa Wilkins RN                           
Bedside shift change report given to Rosa MORA (oncoming nurse) by Jodie MARTIN RN (offgoing nurse). Report included the following information Nurse Handoff Report, Index, ED Encounter Summary, ED SBAR, Intake/Output, MAR, Recent Results, Cardiac Rhythm  , Neuro Assessment, and Event Log.              End of Shift Note    Bedside shift change report given to Vale MORA (oncoming nurse) by Rosa Wilkins RN (offgoing nurse).  Report included the following information SBAR, Kardex, Intake/Output, MAR, Recent Results, Med Rec Status, Cardiac Rhythm  , and Dual Neuro Assessment    Shift worked:  7PM-7AM     Shift summary and any significant changes:     Lab works done, fair shift spent      Concerns for physician to address:  xxx     Zone phone for oncoming shift:   xxx       Activity:     Number times ambulated in hallways past shift: 0  Number of times OOB to chair past shift: 0    Cardiac:   Cardiac Monitoring: Yes           Access:  Current line(s): PICC     Genitourinary:   Urinary status: external catheter    Respiratory:      Chronic home O2 use?: NO  Incentive spirometer at bedside: NO       GI:     Current diet:  ADULT DIET; Dysphagia - Soft and Bite Sized  ADULT ORAL NUTRITION SUPPLEMENT; Breakfast, Lunch; Standard High Calorie/High Protein Oral Supplement  Passing flatus: YES  Tolerating current diet: NO       Pain Management:   Patient states pain is manageable on current regimen: YES    Skin:     Interventions: turn team, float heels, increase time out of bed, PT/OT consult, internal/external urinary devices, and nutritional support    Patient Safety:  Fall Score:    Interventions: bed/chair alarm, assistive device (walker, cane. etc), gripper socks, pt to call before getting OOB, and stay with me (per policy)       Length of Stay:  Expected LOS: 10  Actual LOS: 8      Rosa Wilkins RN                           
Bedside shift change report given to Rosa MORA (oncoming nurse) by Jodie MORA (offgoing nurse). Report included the following information Nurse Handoff Report, Index, Intake/Output, MAR, Med Rec Status, Cardiac Rhythm  , Alarm Parameters, Neuro Assessment, and Event Log.          End of Shift Note    Bedside shift change report given to Diana MORA (oncoming nurse) by Rosa Wilkins RN (offgoing nurse).  Report included the following information SBAR, ED Summary, Intake/Output, MAR, Recent Results, and Cardiac Rhythm      Shift worked:  7pm-7am     Shift summary and any significant changes:     Visited by family members, NPO for biopsy today,      Concerns for physician to address:  xxx     Zone phone for oncoming shift:   xxx       Activity:     Number times ambulated in hallways past shift: 0  Number of times OOB to chair past shift: 0    Cardiac:   Cardiac Monitoring: Yes           Access:  Current line(s): PIV     Genitourinary:   Urinary status: external catheter    Respiratory:      Chronic home O2 use?: NO  Incentive spirometer at bedside: NO       GI:     Current diet:  ADULT DIET; Easy to Chew  Passing flatus: YES  Tolerating current diet: YES       Pain Management:   Patient states pain is manageable on current regimen: YES    Skin:     Interventions: turn team, specialty bed, float heels, increase time out of bed, PT/OT consult, limit briefs, internal/external urinary devices, and nutritional support    Patient Safety:  Fall Score:    Interventions: bed/chair alarm, assistive device (walker, cane. etc), gripper socks, pt to call before getting OOB, and stay with me (per policy)       Length of Stay:  Expected LOS: 17  Actual LOS: 14      Rosa Wilkins RN                           
Bedside shift change report given to Rosa MORA (oncoming nurse) by Nina MORA (offgoing nurse). Report included the following information Nurse Handoff Report, Index, ED SBAR, Intake/Output, MAR, Recent Results, Med Rec Status, Cardiac Rhythm  , Alarm Parameters, Neuro Assessment, and Event Log.            End of Shift Note    Bedside shift change report given to Jodie MROA (oncoming nurse) by Rosa Wilkins RN (offgoing nurse).  Report included the following information SBAR, Intake/Output, MAR, Accordion, Med Rec Status, Cardiac Rhythm  , and Alarm Parameters     Shift worked:  7pm-7am     Shift summary and any significant changes:     Hospitalist informed about new CT report, no further orders made, Had fever on and off for shift, received PRN acetaminophen suppository. Lab works done     Concerns for physician to address:  xxx     Zone phone for oncoming shift:   xxx       Activity:     Number times ambulated in hallways past shift: 0  Number of times OOB to chair past shift: 0    Cardiac:   Cardiac Monitoring: Yes           Access:  Current line(s): midline     Genitourinary:   Urinary status: voiding and external catheter    Respiratory:      Chronic home O2 use?: NO  Incentive spirometer at bedside: NO       GI:     Current diet:  Diet NPO Exceptions are: Sips of Water with Meds  Passing flatus: YES  Tolerating current diet: NO       Pain Management:   Patient states pain is manageable on current regimen: YES    Skin:     Interventions: turn team, specialty bed, float heels, increase time out of bed, PT/OT consult, limit briefs, internal/external urinary devices, and nutritional support    Patient Safety:  Fall Score:    Interventions: bed/chair alarm, assistive device (walker, cane. etc), gripper socks, pt to call before getting OOB, and stay with me (per policy)       Length of Stay:  Expected LOS: 16  Actual LOS: 13      Rosa Wilkins RN                           
Brief brief summary of visit(full note to follow)    Patient is alert oriented x 2, does not have insight into her condition, lacks capacity to make medical decisions.    Met with patient brother Lorne//legal next of kin/surrogate decision maker.    Plan is discharged home with hospice however patient is enrolled in [facility], so I will not hospice consult , will defer to  to coordinate with [facility] program for comfort focused care at home internally through [facility].    Lorne/brother/legal next of kin agrees for DNR and DNI, he signed a durable DNR.    We are available to support as needed.    Thank you for allowing us to be part of [facility].  
Called to have US guide IV access. Ultrasound assessment of Left upper arm findings: Cephalic vein not visualized. Basilic vein non-compressible. Brachial veins too small and non-compressible. Ultrasound assessment of  Right upper arm findings: Cephalic is extremely tiny. Basilic is non-compressible and both branches of the brachial vein are non-compressible until the axilla. Recommend EJ or IJ for better access.    Yessica Soto RN, BSN, CRNI, [facility]-BC  Vascular Access Team    
Central Line Procedure Note    Indication: Inadequate venous access    Risks, benefits, alternatives explained and patient agrees to proceed.    Patient positioned in Trendelenburg.    7-Step Sterility Protocol followed.  (cap, mask sterile gown, sterile gloves, large sterile sheet, hand hygiene, 2% chlorhexidine for cutaneous antisepsis)  5 mL 1% Lidocaine placed at insertion site.      Right internal jugular cannulated x 1 attempt(s) utilizing the Seldinger technique.  7French 16cm triple lumen placed under US guidance. Guidewire visualized in lumen of Right IJ and blood return via all ports confirmed.    Catheter secured & Tegaderm applied         Quincy Finn MD  Anesthesiology  CXR pending.    Care turned over to covering Attending MD.    
Chart reviewed for follow-up, I spoke to Dr. Mcpherson patient is febrile and discharge has been on hold, I spoke to the  [facility] Pacheco, patient has Medicaid in place and she is accepted for [facility] and rehab /LTC     I spoke to her brother Lorne//legal next of kin, plan is to treat her fever, medically stabilize her and sent her to long-term care with hospice services     I will place a hospice consult,  [facility] P[facility] will coordinate with the [facility] and rehab facility, to check what hospice agency they use.  Of note patient is off [facility] program as of today per .    Goals are clear.    I will sign off.    Thank you for allowing me to take care of Ms. Lillie Dakotah..  
Comprehensive Nutrition Assessment    Type and Reason for Visit:  Initial, RD Nutrition Re-Screen/LOS    Nutrition Recommendations/Plan:   Advance back to soft and bite sized diet as tolerated  Add high kcal/high protein ONS BID once diet advanced     Malnutrition Assessment:  Malnutrition Status:  Insufficient data (04/10/24 1119)      Nutrition Assessment:    Patient medically noted for sepsis, pneumonia, hyponatremia, lung mass, anemia, and melena. PMH HTN, bipolar, and stroke. Chart reviewed for length of stay. Patient sleeping soundly at time of visit. NPO today for EGD. Poor PO intake noted per flowsheets. SLP following and recommended a soft and bite sized diet. Patient would benefit from supplements if agreeable. Will monitor progress and plan of care. Encourage intake of meals and assist with PO as needed once diet advanced.     Patient Vitals for the past 120 hrs:   PO Meals Eaten (%)   04/08/24 1800 1 - 25%   04/08/24 1402 0%   04/08/24 0928 0%   04/07/24 1001 1 - 25%   04/06/24 1723 1 - 25%   04/06/24 1400 0%   04/06/24 0932 51 - 75%     Nutrition Related Findings:    na 132, K+ 3.2, BG 94   BM 4/9   Atorvastatin, Lasix, Remeron, Protonix, Zosyn, Senokot         Current Nutrition Intake & Therapies:          Diet NPO Exceptions are: Sips of Water with Meds    Anthropometric Measures:  Height: 160 cm (5' 2.99\")  Ideal Body Weight (IBW): 115 lbs (52 kg)       Current Body Weight: 62.1 kg (136 lb 14.5 oz),   IBW.    Current BMI (kg/m2): 24.3                          BMI Categories: Normal Weight (BMI 18.5-24.9)    Estimated Daily Nutrient Needs:  Energy Requirements Based On: Formula  Weight Used for Energy Requirements: Current  Energy (kcal/day): 1451 kcals (BMR x 1.3AF)  Weight Used for Protein Requirements: Current  Protein (g/day): 62-75g (1.0-1.2 g/kg bw)  Method Used for Fluid Requirements: 1 ml/kcal  Fluid (ml/day): 1450 mL    Nutrition Diagnosis:   Inadequate protein-energy intake related to 
Comprehensive Nutrition Assessment    Type and Reason for Visit:  Reassess    Nutrition Recommendations/Plan:   Advance to a soft and bite sized diet per SLP recommendations  Resume ensure clear and magic cups daily once diet ordered     Malnutrition Assessment:  Malnutrition Status:  Insufficient data (04/10/24 1119)      Nutrition Assessment:    Chart reviewed; patient made NPO this morning for biopsy of supraclavicular nodes. SLP with recommendations for a soft and bite sized diet. Difficult to assess PO intake as patient has been NPO at least 3 of the last 4 days for tests and procedures. Ensure clear and magic cups were added at time of last assessment. Patient sleeping soundly at time of visit; no family present. Resume diet and supplements as able. Encourage intake of meals and assist with PO as needed.     Nutrition Related Findings:    Na 133, K+ 3.7, Phos 4.8,    BM 4/18   Atorvastatin, Lasix, Remeron, Protonix, KCl, Prednisone, Senokot   Wound Type: None       Current Nutrition Intake & Therapies:          Diet NPO Exceptions are: Sips of Water with Meds    Anthropometric Measures:  Height: 160 cm (5' 2.99\")  Ideal Body Weight (IBW): 115 lbs (52 kg)       Current Body Weight: 61.7 kg (136 lb 0.4 oz),   IBW.    Current BMI (kg/m2): 24.1                          BMI Categories: Normal Weight (BMI 18.5-24.9)    Estimated Daily Nutrient Needs:  Energy Requirements Based On: Formula  Weight Used for Energy Requirements: Current  Energy (kcal/day): 1444 kcals (BMR x 1.3AF)  Weight Used for Protein Requirements: Current  Protein (g/day): 62-74g (1.0-1.2 g/kg bw)  Method Used for Fluid Requirements: 1 ml/kcal  Fluid (ml/day): 1450 mL    Nutrition Diagnosis:   Inadequate protein-energy intake related to cognitive or neurological impairment, altered GI function as evidenced by  (varying PO, off/on NPO status)    Nutrition Interventions:   Food and/or Nutrient Delivery: Start Oral Diet, Start Oral Nutrition 
Comprehensive Nutrition Assessment    Type and Reason for Visit:  Reassess    Nutrition Recommendations/Plan:   Advance to soft and bite sized diet as tolerated  Add magic cups and ensure clear daily for patient to try once diet advanced     Malnutrition Assessment:  Malnutrition Status:  Insufficient data (04/10/24 1119)      Nutrition Assessment:    Chart reviewed; patient made NPO today for pill cam study. Patient unable to answer questions regarding appetite, recent PO intake, or UBW at time of visit. She was previously receiving a soft and bite sized diet with ensure plus high protein. RN reports she couldn't get the patient to drink the ensure shake last week. Will adjust ONS once diet advanced. Palliative care reconsulted for goals of care discussion.     Nutrition Related Findings:    Na 131,K+ 3.5, BG 93, Hg 6.7   BM 4/15   Atorvastatin, Lasix, remeron, Protonix, KCl, Senokot, Carafate   Wound Type: None       Current Nutrition Intake & Therapies:          Diet NPO Exceptions are: Sips of Water with Meds    Anthropometric Measures:  Height: 160 cm (5' 2.99\")  Ideal Body Weight (IBW): 115 lbs (52 kg)       Current Body Weight: 60.2 kg (132 lb 11.5 oz),   IBW.    Current BMI (kg/m2): 23.5                          BMI Categories: Normal Weight (BMI 18.5-24.9)    Estimated Daily Nutrient Needs:  Energy Requirements Based On: Formula  Weight Used for Energy Requirements: Current  Energy (kcal/day): 1426 kcals (BMR x 1.3AF)  Weight Used for Protein Requirements: Current  Protein (g/day): 60-72g (1.0-1.2 g/kg bw)  Method Used for Fluid Requirements: 1 ml/kcal  Fluid (ml/day): 1400 mL    Nutrition Diagnosis:   Inadequate protein-energy intake related to cognitive or neurological impairment, altered GI function as evidenced by  (varying PO intake)    Nutrition Interventions:   Food and/or Nutrient Delivery: Start Oral Diet, Start Oral Nutrition Supplement  Nutrition Education/Counseling: No recommendation at this 
Discussed with nursing who requested to hold at this time. Pt receiving blood transfusion.    
End of Shift Note    Bedside shift change report given to  MORGAN Feliciano(oncoming nurse) by Quiana Wilkins RN (offgoing nurse).  Report included the following information SBAR, Kardex, Intake/Output, MAR, and Recent Results    Shift worked:  night     Shift summary and any significant changes:     Hgb 7.7, weaned O2 2.5     Concerns for physician to address:       Zone phone for oncoming shift:          Activity:     Number times ambulated in hallways past shift: 0  Number of times OOB to chair past shift: 0    Cardiac:   Cardiac Monitoring: Yes           Access:  Current line(s): PIV     Genitourinary:   Urinary status: voiding and external catheter    Respiratory:      Chronic home O2 use?: NO  Incentive spirometer at bedside: NO       GI:     Current diet:  Diet NPO Exceptions are: Sips of Water with Meds  Passing flatus: YES  Tolerating current diet: YES       Pain Management:   Patient states pain is manageable on current regimen: YES    Skin:     Interventions: specialty bed, float heels, increase time out of bed, foam dressing, and PT/OT consult    Patient Safety:  Fall Score:    Interventions: bed/chair alarm, assistive device (walker, cane. etc), gripper socks, and pt to call before getting OOB       Length of Stay:  Expected LOS: 10  Actual LOS: 6      Quiana Wilkins RN                              
End of Shift Note    Bedside shift change report given to  MORGAN Garcia(oncoming nurse) by Quiana Wilkins RN (offgoing nurse).  Report included the following information SBAR, Kardex, Intake/Output, MAR, and Recent Results    Shift worked:  night     Shift summary and any significant changes:    Pt hgb came back critical at 5.9; NP notified blood ordered, dayshift will administer has special antibodies will take time to come to hospital.     Concerns for physician to address:    Zone phone for oncoming shift:          Activity:     Number times ambulated in hallways past shift: 0  Number of times OOB to chair past shift: 0    Cardiac:   Cardiac Monitoring: Yes           Access:  Current line(s): PIV     Genitourinary:   Urinary status: voiding and external catheter    Respiratory:      Chronic home O2 use?: YES  Incentive spirometer at bedside: NO       GI:     Current diet:  ADULT ORAL NUTRITION SUPPLEMENT; Breakfast, Lunch; Standard High Calorie/High Protein Oral Supplement  ADULT DIET; Dysphagia - Soft and Bite Sized  Passing flatus: YES  Tolerating current diet: YES       Pain Management:   Patient states pain is manageable on current regimen: YES    Skin:     Interventions: specialty bed, float heels, increase time out of bed, foam dressing, and PT/OT consult    Patient Safety:  Fall Score:    Interventions: bed/chair alarm, assistive device (walker, cane. etc), gripper socks, and pt to call before getting OOB       Length of Stay:  Expected LOS: 7  Actual LOS: 6      Quiana Wilkins RN                            
End of Shift Note    Bedside shift change report given to Effie (oncoming nurse) by Merlin Lang, RN (offgoing nurse).  Report included the following information SBAR, ED Summary, Procedure Summary, Intake/Output, MAR, Recent Results, and Cardiac Rhythm NSR    Shift worked:  7a-7p     Shift summary and any significant changes:    0715: Pt has fever of 102.6 at shift change. Acetaminophen PO administered. Pt continues to be Alert to self only with expressive aphasia     0930: Temp 100.3       Concerns for physician to address:  Poor PO intake     Zone phone for oncoming shift:   [phone]       Activity:     Number times ambulated in hallways past shift: 0  Number of times OOB to chair past shift: 0    Cardiac:   Cardiac Monitoring: Yes           Access:  Current line(s): PIV     Genitourinary:   Urinary status: voiding, incontinent, and external catheter    Respiratory:      Chronic home O2 use?: NO  Incentive spirometer at bedside: YES       GI:     Current diet:  ADULT DIET; Easy to Chew; 4 carb choices (60 gm/meal); Low Fat/Low Chol/High Fiber/2 gm Na  ADULT ORAL NUTRITION SUPPLEMENT; Breakfast; Clear Liquid Oral Supplement  ADULT ORAL NUTRITION SUPPLEMENT; Lunch; Frozen Oral Supplement  DIET ONE TIME MESSAGE;  Passing flatus: YES  Tolerating current diet: YES       Pain Management:   Patient states pain is manageable on current regimen: YES    Skin:     Interventions: specialty bed, float heels, and internal/external urinary devices    Patient Safety:  Fall Score:    Interventions: bed/chair alarm, gripper socks, and pt to call before getting OOB       Length of Stay:  Expected LOS: 23  Actual LOS: 22      Merlin Lang, RN                            
End of Shift Note    Bedside shift change report given to MORGAN Araya (oncoming nurse) by Daron Donald RN (offgoing nurse).  Report included the following information SBAR, Procedure Summary, Intake/Output, MAR, Recent Results, Med Rec Status, Cardiac Rhythm SR/ST, and Alarm Parameters     Shift worked:  7a-7p     Shift summary and any significant changes:     I unit PRBC given hbg up, continues tarry stools x5, endoscopy cancelled, attempted wean of O2     Concerns for physician to address:  none     Zone phone for oncoming shift:          Activity:     Number times ambulated in hallways past shift: 0  Number of times OOB to chair past shift: 0    Cardiac:   Cardiac Monitoring: Yes           Access:  Current line(s): PIV    Genitourinary:   Urinary status: voiding and external catheter    Respiratory:      Chronic home O2 use?: NO  Incentive spirometer at bedside: YES       GI:     Current diet:  ADULT DIET; Dysphagia - Soft and Bite Sized  Diet NPO Exceptions are: Sips of Water with Meds  Passing flatus: YES  Tolerating current diet: YES       Pain Management:   Patient states pain is manageable on current regimen: YES    Skin:     Interventions: specialty bed, float heels, foam dressing, PT/OT consult, limit briefs, internal/external urinary devices, and nutritional support    Patient Safety:  Fall Score:    Interventions: bed/chair alarm, assistive device (walker, cane. etc), gripper socks, pt to call before getting OOB, and stay with me (per policy)       Length of Stay:  Expected LOS: 10  Actual LOS: 6      Daron Donald RN                            
End of Shift Note    Bedside shift change report given to MORGAN Carlisle (oncoming nurse) by Jodie Elizabeth RN (offgoing nurse).  Report included the following information SBAR, Intake/Output, and Cardiac Rhythm Normal Sinus    Shift worked:  7a-7p     Shift summary and any significant changes:     New USG 18g IV placed by anesthesia today. D/t lack of access IV abx delayed.  Vanc & Potassium verified to be compatible to run together this evening.   Low appetite, but will eat and drink w encouragement from family.   Repositioned with night nurse     Concerns for physician to address:  Poor po intake     Zone phone for oncoming shift:   [phone]         Activity:     Number times ambulated in hallways past shift: 0  Number of times OOB to chair past shift: 0    Cardiac:   Cardiac Monitoring: Yes           Access:  Current line(s): PIV     Genitourinary:   Urinary status: voiding, incontinent, and external catheter    Respiratory:      Chronic home O2 use?: YES  Incentive spirometer at bedside: YES       GI:     Current diet:  ADULT DIET; Dysphagia - Soft and Bite Sized  ADULT ORAL NUTRITION SUPPLEMENT; Breakfast, Lunch; Standard High Calorie/High Protein Oral Supplement  Passing flatus: YES  Tolerating current diet: YES       Pain Management:   Patient states pain is manageable on current regimen: YES    Skin:     Interventions: PT/OT consult    Patient Safety:  Fall Score:    Interventions: gripper socks       Length of Stay:  Expected LOS: 13  Actual LOS: 11      Jodie Elizabeth RN                            
End of Shift Note    Bedside shift change report given to MORGAN Cooper  (oncoming nurse) by Jodie Elizabeth RN (offgoing nurse).  Report included the following information SBAR, Intake/Output, and Cardiac Rhythm Normal Sinus, Sinus Tach    Shift worked:  7a-7p     Shift summary and any significant changes:     Central Line placed today. EGD and Pill cam placed today, Endo will come to collect supplies for pill cam in am.   US biopsy tomorrow, consent is in chart.      Concerns for physician to address:  Lung mass.  Plan of care     Zone phone for oncoming shift:   [phone]       Activity:     Number times ambulated in hallways past shift: 0  Number of times OOB to chair past shift: 0    Cardiac:   Cardiac Monitoring: Yes           Access:  Current line(s): PIV and central line     Genitourinary:   Urinary status: voiding and external catheter    Respiratory:      Chronic home O2 use?: YES  Incentive spirometer at bedside: NO       GI:     Current diet:  ADULT DIET; Clear Liquid  ADULT DIET; Easy to Chew  Passing flatus: YES  Tolerating current diet: NO       Pain Management:   Patient states pain is manageable on current regimen: YES    Skin:     Interventions: increase time out of bed    Patient Safety:  Fall Score:    Interventions: pt to call before getting OOB       Length of Stay:  Expected LOS: 17  Actual LOS: 14      Jodie Elizabeth RN                            
End of Shift Note    Bedside shift change report given to MORGAN Curtis (oncoming nurse) by Malinda Mondragon RN (offgoing nurse).  Report included the following information SBAR    Shift worked:  4929-2443     Shift summary and any significant changes:    Received pt from PCU  Oriented to self  VS stable  Denies pain  Bed alarm on  No labs this shift  Was able to get some rest  Uneventful shift   Concerns for physician to address:      Zone phone for oncoming shift:             Malinda Mondragon RN                            
End of Shift Note    Bedside shift change report given to MORGAN Jo (oncoming nurse) by BALTA WHEATLEY RN (offgoing nurse).  Report included the following information SBAR, Kardex, ED Summary, Procedure Summary, Intake/Output, MAR, Recent Results, and Cardiac Rhythm Nsr    Shift worked:  7-7     Shift summary and any significant changes:     Pt refused almost all food even a homemade milkshake. Eats better with family they bring food from home. Maybe eats once a day. Family supportive. Kept pt cleaned ane turned . Dual skin end of shift   Concerns for physician to address:  Work with family, Palliative to follow     Zone phone for oncoming shift:          Activity:     Number times ambulated in hallways past shift: 0  Number of times OOB to chair past shift: 0    Cardiac:   Cardiac Monitoring: Yes           Access:  Current line(s): PIV and central line     Genitourinary:   Urinary status: voiding, incontinent, and external catheter    Respiratory:      Chronic home O2 use?: NO  Incentive spirometer at bedside: YES       GI:     Current diet:  ADULT DIET; Easy to Chew; 4 carb choices (60 gm/meal); Low Fat/Low Chol/High Fiber/2 gm Na  ADULT ORAL NUTRITION SUPPLEMENT; Breakfast; Clear Liquid Oral Supplement  ADULT ORAL NUTRITION SUPPLEMENT; Lunch; Frozen Oral Supplement  Passing flatus: NO  Tolerating current diet: NO       Pain Management:   Patient states pain is manageable on current regimen: YES    Skin:     Interventions: specialty bed, float heels, internal/external urinary devices, and nutritional support    Patient Safety:  Fall Score:    Interventions: bed/chair alarm, gripper socks, and pt to call before getting OOB       Length of Stay:  Expected LOS: 20  Actual LOS: 18      BALTA WHEATLEY RN                              
End of Shift Note    Bedside shift change report given to Myriam (oncoming nurse) by Merlin Lang, RN (offgoing nurse).  Report included the following information SBAR, ED Summary, Procedure Summary, Intake/Output, MAR, Recent Results, and Cardiac Rhythm NSR    Shift worked:  7a-7p       Shift summary and any significant changes:    Received pt from ED ~1600. Pt was alert to self only and weak. On RA in ED, but placed on 2L NC due to O2 sat being in the 80's after transport.    Pressures intermittently soft.    Pt had episode of diarrhea that was watery, black, and sweetly. Attending notified, C-diff test ordered.      Concerns for physician to address:       Zone phone for oncoming shift:          Activity:     Number times ambulated in hallways past shift: 0  Number of times OOB to chair past shift: 0    Cardiac:   Cardiac Monitoring: Yes           Access:  Current line(s): PIV     Genitourinary:   Urinary status: incontinent and external catheter    Respiratory:      Chronic home O2 use?: N/A  Incentive spirometer at bedside: N/A       GI:     Current diet:  ADULT DIET; Dysphagia - Soft and Bite Sized  Passing flatus: YES  Tolerating current diet: YES       Pain Management:   Patient states pain is manageable on current regimen: YES    Skin:     Interventions: float heels and internal/external urinary devices    Patient Safety:  Fall Score:    Interventions: bed/chair alarm, gripper socks, and pt to call before getting OOB       Length of Stay:  Expected LOS: 4  Actual LOS: 0      Merlin Lang, RN                            
End of Shift Note    Bedside shift change report given to Nina (oncoming nurse) by Blessed Vidal RN (offgoing nurse).  Report included the following information SBAR, Kardex, and Cardiac Rhythm normal sinus rhythm    Shift worked:  7p-7a     Shift summary and any significant changes:     Patient had one dark stool overnight remains on sodium level checks every 8hours and this morning hemoglobin is 6.7     Concerns for physician to address:  Patient's hemoglobin level is 6.7     Zone phone for oncoming shift:   [phone]       Activity:     Number times ambulated in hallways past shift: 0  Number of times OOB to chair past shift: 0    Cardiac:   Cardiac Monitoring: Yes           Access:  Current line(s): PIV     Genitourinary:   Urinary status: external catheter    Respiratory:      Chronic home O2 use?: NO  Incentive spirometer at bedside: YES       GI:     Current diet:  ADULT DIET; Easy to Chew  Passing flatus: YES  Tolerating current diet: YES       Pain Management:   Patient states pain is manageable on current regimen: YES    Skin:     Interventions: internal/external urinary devices    Patient Safety:  Fall Score:    Interventions: gripper socks       Length of Stay:  Expected LOS: 15  Actual LOS: 13      Blessed Vidal RN                            
End of Shift Note    Bedside shift change report given to Quinton MORA  (oncoming nurse) by Kirsten Amor RN (offgoing nurse).  Report included the following information SBAR    Shift worked:  2573-0980     Shift summary and any significant changes:     0800 Some confusion off and on. Can't verbalize wants seems frustrated. Temp increased tylenol given will reevaluate. Becomes agitated and irritable.    1105 PT had a coughing fit with a copious amount of clear sputum.   1323 PT transported for thoracentesis   1545 PT returned    Concerns for physician to address:       Zone phone for oncoming shift:          Activity:     Number times ambulated in hallways past shift: 0  Number of times OOB to chair past shift: 0    Cardiac:   Cardiac Monitoring: Yes           Access:  Current line(s): central line     Genitourinary:   Urinary status: voiding, incontinent, and external catheter           GI:     Current diet:  ADULT DIET; Dysphagia - Soft and Bite Sized  ADULT ORAL NUTRITION SUPPLEMENT; Breakfast, Lunch; Standard High Calorie/High Protein Oral Supplement    Tolerating current diet: NO        Pain Management:   Patient states pain is manageable on current regimen: N/A    Skin:     Interventions: specialty bed, float heels, internal/external urinary devices, and nutritional support    Patient Safety:  Fall Score:    Interventions: bed/chair alarm, gripper socks, pt to call before getting OOB, and stay with me (per policy)           Kirsten Amor RN                          
End of Shift Note    Bedside shift change report given to Rosa (oncoming nurse) by BALTA WHEATLEY RN (offgoing nurse).  Report included the following information SBAR, Kardex, ED Summary, Procedure Summary, Intake/Output, MAR, Recent Results, and Cardiac Rhythm NSR    Shift worked:  7-7     Shift summary and any significant changes:     Pt has very poor appetite and hates our hospital food says it always seems like the same things. Made milkshake with ensure ice cream and banna and she took 1/2 and the was full. Called new oncology consult. Pt went down for CT brain.     Concerns for physician to address:  Support family and work with pt.     Zone phone for oncoming shift:          Activity:     Number times ambulated in hallways past shift: 0  Number of times OOB to chair past shift: 0    Cardiac:   Cardiac Monitoring: Yes           Access:  Current line(s): PIV and central line     Genitourinary:   Urinary status: voiding, incontinent, and external catheter    Respiratory:      Chronic home O2 use?: NO  Incentive spirometer at bedside: YES       GI:     Current diet:  ADULT DIET; Easy to Chew; 4 carb choices (60 gm/meal); Low Fat/Low Chol/High Fiber/2 gm Na  ADULT ORAL NUTRITION SUPPLEMENT; Breakfast; Clear Liquid Oral Supplement  ADULT ORAL NUTRITION SUPPLEMENT; Lunch; Frozen Oral Supplement  Passing flatus: NO  Tolerating current diet: NO       Pain Management:   Patient states pain is manageable on current regimen: YES    Skin:     Interventions: specialty bed, float heels, PT/OT consult, internal/external urinary devices, and nutritional support    Patient Safety:  Fall Score:    Interventions: bed/chair alarm, gripper socks, pt to call before getting OOB, and stay with me (per policy)       Length of Stay:  Expected LOS: 20  Actual LOS: 17      BALTA WHEATLEY, RN                            
End of Shift Note    Bedside shift change report given to Rosa MORA  (oncoming nurse) by Kirsten Amor RN (offgoing nurse).  Report included the following information SBAR    Shift worked:  6022-7529     Shift summary and any significant changes:     1109 Start of blood transfusion. Notified Dr Mendel of possible plural effusion of right lung when visiting PT.    1400 Transfusion finished tolerated well   1530 Leave floor for CT  1630 PT return to floor  1700 Order in for central line was not able to obtain consent. I was told IR does not put central lines in and consulted anesthesiology and they could not consent was told that attending would need to consent. Charge notified central line needs to be placed. PT does have IV access but per PICC team does not have any other available veins.   PT had two tarry stools     Concerns for physician to address:       Zone phone for oncoming shift:          Activity:     Number times ambulated in hallways past shift: 0  Number of times OOB to chair past shift: 0    Cardiac:   Cardiac Monitoring: Yes           Access:  Current line(s): PIV     Genitourinary:   Urinary status: voiding, incontinent, and external catheter      GI:     Current diet:  ADULT DIET; Easy to Chew    Tolerating current diet: NO       Pain Management:   Patient states pain is manageable on current regimen: N/A    Skin:     Interventions: float heels, internal/external urinary devices, and nutritional support    Patient Safety:  Fall Score:    Interventions: bed/chair alarm, gripper socks, pt to call before getting OOB, and stay with me (per policy)           Kirsten Amor RN                          
End of Shift Note    Bedside shift change report given to Tata MORA  (oncoming nurse) by Deysi Reddy RN (offgoing nurse).  Report included the following information SBAR, MAR, and Cardiac Rhythm sinus Rhythm    Shift worked:  7p-7.30a     Shift summary and any significant changes:     O2 @ 3 lit /min    Am lab works done.   Could not weigh patient because bed scale is not working.     Concerns for physician to address:       Zone phone for oncoming shift:          Activity:     Number times ambulated in hallways past shift: 0  Number of times OOB to chair past shift: 0    Cardiac:   Cardiac Monitoring: Yes           Access:  Current line(s): PIV and central line     Genitourinary:   Urinary status: voiding and external catheter    Respiratory:      Chronic home O2 use?: NO  Incentive spirometer at bedside: YES       GI:     Current diet:  ADULT DIET; Easy to Chew; 4 carb choices (60 gm/meal); Low Fat/Low Chol/High Fiber/2 gm Na  ADULT ORAL NUTRITION SUPPLEMENT; Breakfast; Clear Liquid Oral Supplement  ADULT ORAL NUTRITION SUPPLEMENT; Lunch; Frozen Oral Supplement  Passing flatus: YES  Tolerating current diet: YES       Pain Management:   Patient states pain is manageable on current regimen: YES    Skin:     Interventions: turn team, specialty bed, float heels, increase time out of bed, limit briefs, internal/external urinary devices, and nutritional support    Patient Safety:  Fall Score:    Interventions: bed/chair alarm, assistive device (walker, cane. etc), gripper socks, and pt to call before getting OOB       Length of Stay:  Expected LOS: 20  Actual LOS: 19      Deysi Reddy RN                            
End of Shift Note    Bedside shift change report given to Tata MORA (oncoming nurse) by Deysi Reddy RN (offgoing nurse).  Report included the following information SBAR, MAR, and Cardiac Rhythm Normal Sinus Rhythm    Shift worked:  7p-7.30a     Shift summary and any significant changes:     -Tylenol X 1 for abdominal pain.   - O2 @ 3 lit / min at night   - Patient weight could not be taken because bed scale is not functioning.       Concerns for physician to address:       Zone phone for oncoming shift:          Activity:     Number times ambulated in hallways past shift: 0  Number of times OOB to chair past shift: 0    Cardiac:   Cardiac Monitoring: Yes           Access:  Current line(s): PIV and central line     Genitourinary:   Urinary status: voiding and external catheter    Respiratory:      Chronic home O2 use?: NO  Incentive spirometer at bedside: YES       GI:     Current diet:  ADULT DIET; Easy to Chew; 4 carb choices (60 gm/meal); Low Fat/Low Chol/High Fiber/2 gm Na  ADULT ORAL NUTRITION SUPPLEMENT; Breakfast; Clear Liquid Oral Supplement  ADULT ORAL NUTRITION SUPPLEMENT; Lunch; Frozen Oral Supplement  DIET ONE TIME MESSAGE;  Passing flatus: YES  Tolerating current diet: YES       Pain Management:   Patient states pain is manageable on current regimen: YES    Skin:     Interventions: turn team, specialty bed, float heels, increase time out of bed, PT/OT consult, limit briefs, internal/external urinary devices, and nutritional support    Patient Safety:  Fall Score:    Interventions: bed/chair alarm, assistive device (walker, cane. etc), gripper socks, and pt to call before getting OOB       Length of Stay:  Expected LOS: 22  Actual LOS: 21      Deysi Reddy RN                            
End of Shift Note    Bedside shift change report given to Yaima (oncoming nurse) by Merlin Lang, RN (offgoing nurse).  Report included the following information SBAR, Procedure Summary, Intake/Output, MAR, Accordion, Recent Results, and Cardiac Rhythm Sinus Tach    Shift worked:  7a-7p     Shift summary and any significant changes:     Pt continues to be alert to self only. Experiencing chills and shivering, febrile during nightshift, but afebrile for this RN. Right sided flank pain present.     Family brought in Nuedexta last night, but due to lack of packaging with barcode, pharmacy is unable to accept it. Family visited during afternoon and explained reason for not being able to give medication- verbalized understanding.       Pt is not eating meals, despite attempting different foods/snacks     Pt febrile at end of shift 101.2         Concerns for physician to address:  Malnutrition- Pt is not eating meals       Zone phone for oncoming shift:          Activity:     Number times ambulated in hallways past shift: 0  Number of times OOB to chair past shift: 0    Cardiac:   Cardiac Monitoring: Yes           Access:  Current line(s): PIV     Genitourinary:   Urinary status: voiding, incontinent, and external catheter    Respiratory:      Chronic home O2 use?: NO  Incentive spirometer at bedside: YES       GI:     Current diet:  ADULT DIET; Dysphagia - Soft and Bite Sized  Passing flatus: NO  Tolerating current diet: NO       Pain Management:   Patient states pain is manageable on current regimen: YES    Skin:     Interventions: specialty bed, float heels, and internal/external urinary devices    Patient Safety:  Fall Score:    Interventions: bed/chair alarm, gripper socks, and pt to call before getting OOB       Length of Stay:  Expected LOS: 5  Actual LOS: 1      Merlin Lang, RN                            
Endoscope was pre-cleaned at bedside immediately following procedure by ANTONIO Zhang    TRANSFER - OUT REPORT:    Verbal report given to Jodie on Lillie Claire  being transferred to PCU room 2301 for routine progression of patient care       Report consisted of patient's Situation, Background, Assessment and   Recommendations(SBAR).     Information from the following report(s) Surgery Report was reviewed with the receiving nurse.           Lines:   Peripheral IV 04/10/24 Right Antecubital (Active)        Opportunity for questions and clarification was provided.      Patient transported with:  O2 @ 1lpm        
Endoscopy Case End Note:    Procedure scope was pre-cleaned, per protocol, at bedside by Yaima.      Report received from anesthesia.  See anesthesia flowsheet for intra-procedure vital signs and events.    Belongings returned to patient.    
Endoscopy recovery  Patient returned to baseline, vital signs stable (see vital sign flowsheet). Patient offered liquids and tolerated well. Respiratory status within defined limits. Abdomen soft not tender. Skin with in defined limits.         1553-     Patient returned to room via transport post procedure. On 1 lpm of oxygen and tele monitor.     
GI progress note    Notified Ms. Claire' brother, Lorne Claire ([phone]), of results of capsule study and the need for EGD with capsule placement tomorrow. I discussed the procedures with him including risks and he is agreeable. Will make NPO past midnight.   
I have discussed with the brother (POA-Lorne) the rationale for blood component transfusion; its benefits in treating or preventing fatigue, organ damage, or death; and its risk which includes mild transfusion reactions, rare risk of blood borne infection, or more serious but rare reactions. I have discussed the alternatives to transfusion, including the risk and consequences of not receiving transfusion. The brother (IVAN Pradhan) had an opportunity to ask questions and had agreed to proceed with transfusion of blood components.   
I have reviewed and am in agreement with the assessment and documentation as performed by my orientee, MORGAN Damian. Please refer to Nati's progress note for update on pt's daily events.     
Infectious Disease Progress     IMPRESSION:   Sepsis  Lactic acidosis; resolved     Acute respiratory failure due to  CAP  Consolidation of RLL  Lymphadenopathy  Right pleural effusion, s/p thoracentesis (4/12)  - T-max 99.7, wbc 11.6    Procal 0.52, resp panel (-)    PANCHO and RF (-)    CRP 21.3, sed rate 128    Blood cx (4/3) no growth, (4/10, 11, 14) no growth so far    MRSA screen (-)    U/A (-), HIV (-)    Pleural fluid (4/12) 915 nucleated cells, 58 lymphs, cx - no growth so far    Fungitel pending    CXR (4/11) Worsened moderate right pleural effusion and right basilar airspace disease.     Pulmonology following;     Melena  Anemia due to bleeding AVM, s/p tmt  - hgb 6.7; transfusion order in place    GI following; recommend colonoscopy as outpatient     Hyponatremia  - nephrology following; Na 132     Bipolar disease  - continue with POA medication; depakote, remoron, valium     Hypertension  Hyperlipidemia  CVA  - continue with POA medication    PLAN:   - continue with IV zosyn and vancomycin until pleural fluid cx gets finalized    Pharmacy to dose per creatinine clearance.     Monitor renal function closely    Hep C ab +; send HCV RNA (-)    Pharmacy to dose per creatinine clearance.     Monitor renal function closely     Differential dx include but not limited to malignancy, bacterial or fungal infection, asp PNA etc     Evaluated by SPL; recommend 1:1 supervision, upright with PO intake     Consider bronchoscopy with lung bx     Not high risk for TB     Pt has been staying mostly indoor; tickborne illness unlikely             ID team will continue to follow     Plan of care d/w pt and Dr. Garcia     History of Present Illness   68 year old female with medical hx of hypertension, hyperlipidemia, bipolar disease, CVA, substance abuse (injection and snorting) was brought to ER via EMS on 4/2 with AMS, tremors, and falls.     In ER, temp was 101.5, lactic acid 3.29, wbc 9.5, U/A (-), blood cx on admission 
Infectious Disease progress          Impression     Fever, persistent  Lowgrade.  Negative blood cultures, negative RVP/ Covid  Fungitell (-)    Acute blood loss anemia  Melena  Nonbleeding AVM on EGD.  S/p hemoglobin drop 6.7  For PRBC transfusion  per primary team.  GI bleed could also cause fevers.      Acute hypoxic respiratory failure  RLL masslike consolidation  ?  Infection?  Aspiration?  Malignancy  Lymphadenopathy- R/paratracheal, subcarinal and  R/hilar lymphadenopathy.  Concern for malignancy high with ongoing blood loss    R/pleural effusion  S/p ultrasound-guided thoracocentesis  300 mL of fluid evacuated  Cultures -NG, no organisms on GS.         HIV negative  Hep C ab+, probable chronic hepatitis C  HCV RNA-not detected.     Hyponatremia, hypokalemia  Improving  Management per primary team.     Bipolar disorder  Continue on home meds.        4/18  Afebrile, WBC 11  Aspergillus pending  S/p EGD (4/18) At 7:00 hours and 10 minutes, ingested food contents were seen in the stomach, which obscured visualization. No blood was seen in the stomach. No black material or red blood. This is therefore likely a bleed distal to stomach.     Plan  Continue to monitor off abx  S/p ultrasound guided right supraclavicular lymph node biopsy. (4/18)  Pathology results are pending.  Procal (4/3) 1.0->0.6  CRP (4/11) 21.6->16.7  Sed rat (4/11) 128->127    GI following; 27 total AVMs, recommend deep enteroscopy, start on SQ octreotide    D/w Dr Garrido- Outpatient follow-up with pulmonary  & repeat CT in 4 to 6 weeks.May need to have  repeat CT    now given all that is going on  & since she has completed antibiotic therapy  Aspiration precautions.    Antibiotic history  Zosyn -4/3 to 4/16  Vancomycin/3, 4/4, 4/8- 4/16    Extensive review of chart notes, labs, imaging, cultures done  Additionally review of done: Recent reports-Labs, cultures, imaging  D/w - pulmonology, GI team, RN, and Dr. Garcia    Pt has not been 
Infectious Disease progress          Impression     Fever; resolved  Negative blood cultures, negative RVP/ Covid  Fungitell (-)  Procal (4/3) 1.0->0.6  CRP (4/11) 21.6->16.7  Sed rat (4/11) 128->127    Acute blood loss anemia  Melena  Nonbleeding AVM on EGD.  S/p hemoglobin drop 6.7 (4/16), hgb 8.0 (4/19)  For PRBC transfusion  per primary team.  GI bleed could also cause fevers.  Capsule study result (4/18) 27 total AVMs, recommend deep enteroscopy, start on SQ octreotide  Acute hypoxic respiratory failure; resolved  RLL masslike consolidation   Aspiration &/or Malignancy; infectious work up negative  Lymphadenopathy- R/paratracheal, subcarinal and  R/hilar lymphadenopathy.  Concern for malignancy high with ongoing blood loss  S/p  ultrasound guided right supraclavicular lymph node biopsy (4/18); pathology pending    R/pleural effusion  S/p ultrasound-guided thoracocentesis  300 mL of fluid evacuated  Cultures -NG, no organisms on GS.         HIV negative  Hep C ab+, probable chronic hepatitis C  HCV RNA-not detected.     Hyponatremia, hypokalemia  Improving  Management per primary team.     Bipolar disorder  Continue on home meds.      Plan  Continue to monitor off abx    GI following; continue with SQ octreotide, consider referral to [facility] for possible enteroscopy    Pulmonary following  ; Outpatient follow-up with pulmonary & repeat CT in 4 to 6 weeks.May need to have  repeat CT    now given all that is going on  & since she has completed antibiotic therapy  Aspiration precautions.    No active infectious process.  Continue with malignancy work up    Pt will be seen per request. Please contact us with any questions.       Antibiotic history  Zosyn -4/3 to 4/16  Vancomycin/3, 4/4, 4/8- 4/16    Extensive review of chart notes, labs, imaging, cultures done  Additionally review of done: Recent reports-Labs, cultures, imaging  D/w - pulmonology, GI team, RN, and Dr. Garcia    Pt very talkative.     Past Medical 
Name of Procedure: image guided thoracentesis right sided      Vital Signs:  VSS throughout     Fluids Removed: 300 ml yellow clear     Samples sent to lab: yes      Any complications related to procedure: none identified at this time    
Nephrology Progress Note  [facility] / [place] Office  [address], [address]  [place] [address]  Phone - [phone]  Fax - [phone]                 Patient: Lillie Claire                     YOB: 1955        Date- 4/17/2024                                     Admit Date: 4/2/2024   CC: Follow up for  hyponatremia          IMPRESSION & PLAN:   Hyponatremia (suspect secondary to IVVD, poor p.o. intake)  Sepsis  Pneumonia  Right lower lobe lung mass with lymphadenopathy (high possibility malignancy)  Altered mental state  Hypotension      PLAN-  Sodium remained stable  Continue with Lasix  Encourage p.o. intake  Nothing much to add from renal standpoint, renal team will sign off call if needed     Subjective:  Interval History:   Sodium better at 132  CT chest shows right lung mass with lymphadenopathy    Objective:   Vitals:    04/17/24 0552 04/17/24 0730 04/17/24 0800 04/17/24 1000   BP:  124/70 128/74 124/68   Pulse:  87 95 94   Resp:  24 27 (!) 31   Temp:  98.6 °F (37 °C)     TempSrc:  Axillary     SpO2:  96% (!) 82% 95%   Weight: 61.5 kg (135 lb 9.3 oz)      Height:          I/O last 3 completed shifts:  In: 2748.7 [P.O.:880; I.V.:1215; Blood:309.8; IV Piggyback:343.9]  Out: 2701 [Urine:2700; Stool:1]  No intake/output data recorded.      Physical exam:    GEN: Resting  NECK- no mass  RESP: no wheezing  NEURO: Confused      Chart reviewed.         Pertinent Notes reviewed.     Data Review :  Lab Results   Component Value Date/Time     04/17/2024 04:30 AM    K 3.7 04/17/2024 04:30 AM    CL 98 04/17/2024 04:30 AM    CO2 27 04/17/2024 04:30 AM    BUN 13 04/17/2024 04:30 AM    CREATININE 0.88 04/17/2024 04:30 AM    GLUCOSE 122 04/17/2024 04:30 AM    CALCIUM 9.5 04/17/2024 04:30 AM       Lab Results   Component Value Date    WBC 11.0 04/17/2024    HGB 9.7 (L) 04/17/2024    HCT 30.8 (L) 04/17/2024    MCV 92.2 04/17/2024    
Nephrology Progress Note  [facility] / [place] Office  [address], [address]  [place] [address]  Phone - [phone]  Fax - [phone]                 Patient: Lillie Claire                     YOB: 1955        Date- 4/4/2024                                     Admit Date: 4/2/2024   CC: Follow up for hyponatremia          IMPRESSION & PLAN:   Hyponatremia(suspect secondary to IVVD, poor p.o. intake)  Sepsis  Pneumonia  Right lower lobe lung mass/pneumonia  Altered mental state  Hypotension      PLAN-  Sodium much better at 132 meq, appropriate correction.  Encourage p.o. intake  Check BMP daily  Consider IV fluids if p.o. intake is not adequate  IV antibiotics per primary team  Renal team will sign off call if needed     Subjective:  Interval History:   -Seen and examined today  -Feels much better    Objective:   Vitals:    04/04/24 0725 04/04/24 0730 04/04/24 0745 04/04/24 0957   BP:    106/61   Pulse: 90 88 81 87   Resp: 21 27 19    Temp:       TempSrc:       SpO2: 92%      Weight:       Height:          I/O last 3 completed shifts:  In: 2948.6 [P.O.:750; I.V.:1368.8; IV Piggyback:829.9]  Out: 1550 [Urine:1550]  I/O this shift:  In: 265.5 [IV Piggyback:265.5]  Out: 225 [Urine:225]      Physical exam:    GEN: NAD  NECK- no mass  RESP: No wheezing, decreased BS b/l  CVS: S1,S2  RRR  NEURO: Normal speech, Non focal  EXT: No Edema       Chart reviewed.         Pertinent Notes reviewed.     Data Review :  Lab Results   Component Value Date/Time     04/04/2024 04:11 AM    K 3.2 04/04/2024 04:11 AM    CL 99 04/04/2024 04:11 AM    CO2 26 04/04/2024 04:11 AM    BUN 7 04/04/2024 04:11 AM    CREATININE 0.57 04/04/2024 04:11 AM    GLUCOSE 86 04/04/2024 04:11 AM    CALCIUM 8.0 04/04/2024 04:11 AM       Lab Results   Component Value Date    WBC 6.7 04/04/2024    HGB 6.1 (L) 04/04/2024    HCT 18.9 (L) 04/04/2024    MCV 85.5 04/04/2024 
Nephrology Progress Note  [facility] / [place] Office  [address]  [place] [address]  Phone - [phone]  Fax - [phone]                 Patient: Lillie Claire                     YOB: 1955        Date- 4/8/2024                                     Admit Date: 4/2/2024   CC: Follow up for  hyponatremia          IMPRESSION & PLAN:   Hyponatremia (suspect secondary to IVVD, poor p.o. intake)  Sepsis  Pneumonia  Right lower lobe lung mass/pneumonia  Altered mental state  Hypotension      PLAN-  No more ivf   Follow bmp  We will sign off. Please call if needed     Subjective:  Interval History:   Na improved to 136    Objective:   Vitals:    04/08/24 0700 04/08/24 0715 04/08/24 0924 04/08/24 1027   BP:  (!) 145/87 130/80    Pulse: 81 100 (!) 104 87   Resp: 23 27 28 26   Temp:  97.8 °F (36.6 °C)     TempSrc:  Oral     SpO2: 94% 94%     Weight:       Height:          I/O last 3 completed shifts:  In: 4583.5 [P.O.:860; I.V.:2566.9; Blood:378.8; IV Piggyback:777.9]  Out: 2700 [Urine:2700]  I/O this shift:  In: -   Out: 350 [Urine:350]      Physical exam:    GEN: nad  NECK- no mass  RESP: no wheezing  NEURO: Normal speech,non focal      Chart reviewed.         Pertinent Notes reviewed.     Data Review :  Lab Results   Component Value Date/Time     04/08/2024 02:20 AM    K 3.6 04/08/2024 02:20 AM     04/08/2024 02:20 AM    CO2 26 04/08/2024 02:20 AM    BUN 7 04/08/2024 02:20 AM    CREATININE 0.61 04/08/2024 02:20 AM    GLUCOSE 101 04/08/2024 02:20 AM    CALCIUM 7.5 04/08/2024 02:20 AM       Lab Results   Component Value Date    WBC 9.2 04/08/2024    HGB 7.5 (L) 04/08/2024    HCT 23.8 (L) 04/08/2024    MCV 87.8 04/08/2024     04/08/2024      Recent Labs     04/05/24  1416 04/06/24  0534 04/08/24  0220   * 133* 136   K 3.4* 3.2* 3.6   CL 98 102 103   CO2 28 27 26   GLUCOSE 105* 82 101*   BUN 11 9 7 
Nephrology Progress Note  [facility] / [place] Office  [address]  [place] [address]  Phone - [phone]  Fax - [phone]                 Patient: Lillie Claire                     YOB: 1955        Date- 4/5/2024                                     Admit Date: 4/2/2024   CC: Follow up for hyponatremia          IMPRESSION & PLAN:   Hyponatremia(suspect secondary to IVVD, poor p.o. intake)  Sepsis  Pneumonia  Right lower lobe lung mass/pneumonia  Altered mental state  Hypotension      PLAN-  Restarted IV fluids  Ordered labs for today  Encourage p.o. intake  IV device per primary team     Subjective:  Interval History:   -Seen and examined today  -Remains confused  -Labs were drawn this morning, late in reporting.    Objective:   Vitals:    04/04/24 2315 04/05/24 0000 04/05/24 0310 04/05/24 0715   BP: 121/74 (!) 83/64 95/62 (!) 142/105   Pulse: 84 70 70 92   Resp: 24 18 18 20   Temp: 97.8 °F (36.6 °C)  98 °F (36.7 °C) 98.2 °F (36.8 °C)   TempSrc: Oral  Oral Oral   SpO2: 100%  99% 95%   Weight:       Height:          I/O last 3 completed shifts:  In: 3264.1 [P.O.:800; I.V.:1368.8; IV Piggyback:1095.3]  Out: 1775 [Urine:1775]  No intake/output data recorded.      Physical exam:    GEN: NAD  NECK- no mass  RESP: No wheezing, decreased BS b/l  CVS: S1,S2  RRR  NEURO: Normal speech, Non focal  EXT: No Edema       Chart reviewed.         Pertinent Notes reviewed.     Data Review :  Lab Results   Component Value Date/Time     04/04/2024 11:08 AM    K 3.5 04/04/2024 11:08 AM    CL 96 04/04/2024 11:08 AM    CO2 25 04/04/2024 11:08 AM    BUN 9 04/04/2024 11:08 AM    CREATININE 0.74 04/04/2024 11:08 AM    GLUCOSE 83 04/04/2024 11:08 AM    CALCIUM 9.3 04/04/2024 11:08 AM       Lab Results   Component Value Date    WBC 7.4 04/05/2024    HGB 7.8 (L) 04/05/2024    HCT 23.9 (L) 04/05/2024    MCV 86.3 04/05/2024     
No labs today  Serum sodium stable yesterday  Will check back again tomorrow  
Nurse called to give patient breathing because \"she sounded a little wheezy\".  Assessed patient and BBS diminished and clear and patient is asleep.  No breathing treatment given at this time. SPO2 96%.  
Nursing contacted Nocturnist/cross cover provider and notified patient central line has been leaking throughout the day, states line was redressed by dayshift, states that nurse when trying to use attempted to draw back blood from one of the ports and got air. No other concerns reported. Asked nurse to establish other piv access, stat cxr confirm placement- pending, asked nurse to pls msg me when returns so I may review. Nurse reported other piv access w/ u/s already established now. VSS. Will defer further evaluation/management to the day shift primary care team. Patient denies any further complaints or concerns. No acute distress reported. Nursing to notify Hospitalist for further/continued concerns. Will remain available overnight for further concerns if nursing/patient needs.     Update  Nurse instructed to remove the central line, as iv access presently in place, able to confirm with cxr no complicating findings with above reported. Asked nurse to msg dayshift for eval for possible new central line placement if needed.    Update:  Nurse reported piv now no longer working, I confirmed to nurse to please remove the central line as shouldn't be used with above reported concerns. Asked to please have another nurse obtain the piv if she is unable as pt is reported to be a very difficult stick. No other concerns reported.    Non-billable note.   
Nursing contacted Nocturnist/cross cover provider and notified patient having difficulty to tolerate the biap. I had d/w the son/poa risk vs benefits on the phone and his wife about the bipap- they are presently in agreement as d/w them to start bipap therapy. No other concerns reported. No acute distress reported. VSS. Ordered ativan 0.5mg iv x1 and sitter at bedside to be able to hopefully tolerate the bipap. Will defer further evaluation/management to the day shift primary attending care team. Patient denies any further complaints or concerns. Nursing to notify Hospitalist for further/continued concerns. Will remain available overnight for further concerns if nursing/patient needs.     Update  0154 mag 1.4 and k 3.1- ordered 10meq kcl iv x1 and mag 1gm iv x1. T 101.2. ordered blood culture x1 set now. Bmp already ordered for the am, added cbc and mag. No other concerns reported. Hemodynamically stable.    Update  0400 checked back on pt is now on 2lpm, still having some mild belly breathing, appears more comfortable than earlier, is resting at this time. Appears abg was not done earlier when ordered. Called nurse to confirm if not done to please draw now. D/c bipap order at this time, as pt appears stable and is having less work of breathing, and on less o2 than she was earlier, also as pt was not allowing to wear the bipap earlier. Nurse reported they will call RT again to draw the ABG.    Non-billable note.       
Nursing contacted Nocturnist/cross cover provider and notified patient hgb 6.7. no active bleeding reported. No other concerns reported. VSS. On review of labs that have returned thus far this am k 3.5. Ordered 1 unit prbcs with transfusion protocol, kcl 40meq po x1. Will defer further evaluation/management to the day shift primary care team. Patient denies any further complaints or concerns. No acute distress reported. Nursing to notify Hospitalist for further/continued concerns. Will remain available overnight for further concerns if nursing/patient needs.     Non-billable note.   
Nursing contacted Nocturnist/cross cover provider and notified patient lab result of hgb 5.9, no active bleeding reported. VSS. No other reported concerns at this time. Ordered transfusion 1 unit prbcs with transfusion protocol ordered, k 2.9, kcl 40meq po x2. Ordered telemetry. Patient denies any further complaints or concerns. No acute distress reported. Nursing to notify Hospitalist for further/continued concerns. Will remain available overnight for further concerns if nursing/patient needs. Will defer further evaluation/management to the day shift primary care team.    Non-billable note.    
Nutrition: Chart reviewed for follow up. Plans for discharge with comfort focused care; placement pending. Nutrition will sign off but RD available by consult if needs arise. Thanks.  Bettina Morris, RD  Ext [phone]     
PILL CAM report:    (27) total AVMs scattered throughout the entirety of the SB.  Nonbleeding.    Plan:  Consider referral for deep enteroscopy  Can be started on SQ octreotide as alternative treatment    Report dictated # [id number]  
PULMONARY NOTE:  Chart reviewed  VSS afebrile  Sats 96% on 3L NC, room to wean  Final Dx NSCLC  Followed by oncology  Nothing more to add  Will sign off  Ez Estrada MD    
Palliative Medicine  Patient Name: Lillie Claire  YOB: 1955  MRN: [id number]  Age: 68 y.o.  Gender: female    Date of Initial Consult: 4/4/2024  Date of Service: 4/9/2024  Time: 10:38 AM  Provider: Ciara Kay MD  Hospital Day: 8  Admit Date: 4/2/2024  Referring Provider: Dr. Rodas       Reasons for Consultation:  Goals of Care    HISTORY OF PRESENT ILLNESS (HPI):   Lillie Claire is a 68 y.o. female with HTN, HLD, Bipolar disease, cognitive impairment related to previous CVA who was admitted on 4/2/2024 from home with a diagnosis of severe sepsis, acute encephalopathy, abnormal CT of the chest, pneumonia, hyponatremia. She is receiving antibiotics. Pulmonology was consulted--recommend repeat chest CT in 4-6 weeks after completion of antibiotics to re-assess the mass-like lesion. Nephrology was consulted for hyponatremia suspected d/t IVVD, poor PO intake. SLP attempted to evaluate patient but patient refused to participate.    4/3/2024 CT C/A/P:  IMPRESSION:  1. 5 cm x 4.9 cm masslike partial consolidation of the superior segment of the  right lower lobe. 2. Right paratracheal, subcarinal and right hilar  lymphadenopathy.  3. Bronchoscopy and/or CT PET scan can be performed for further evaluation, as  indicated.    Psychosocial: She is not . She has one son named Gianni Claire. She has 5 siblings in total. She has been living with her brother Ellen Sr since her stroke in 2017. Her physical function has been relatively good since her stroke, but she has had cognitive impairment since her stroke in 2017. She is able to walk at baseline. She knows her name and where she lives but is not able to comprehend information and have complex discussions. She enjoys music, especially R&B and Gospel. 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 She is part of the [employer] program. Lorne and Orin are looking into other options for her living.     PALLIATIVE 
Palliative Medicine  Patient Name: Lillie Claire  YOB: 1955  MRN: [id number]  Age: 68 y.o.  Gender: female     Did not hear back from [place] today  Will have Dr Kay who knows this patient well, reach out again on Monday    VALERIE Damon - NP    
Palliative Medicine  Patient Name: Lillie Claire  YOB: 1955  MRN: [id number]  Age: 68 y.o.  Gender: female    Date of Initial Consult: 4/4/2024  Date of Service: 4/5/2024  Time: 1:06 PM  Provider: Nini Fonseca MD  Hospital Day: 4  Admit Date: 4/2/2024  Referring Provider: Dr. Rodas       Reasons for Consultation:  Goals of Care    HISTORY OF PRESENT ILLNESS (HPI):   Lillie Claire is a 68 y.o. female with HTN, HLD, Bipolar disease, cognitive impairment related to previous CVA who was admitted on 4/2/2024 from home with a diagnosis of severe sepsis, acute encephalopathy, abnormal CT of the chest, pneumonia, hyponatremia. She is receiving antibiotics. Pulmonology was consulted--recommend repeat chest CT in 4-6 weeks after completion of antibiotics to re-assess the mass-like lesion. Nephrology was consulted for hyponatremia suspected d/t IVVD, poor PO intake. SLP attempted to evaluate patient but patient refused to participate.    4/3/2024 CT C/A/P:  IMPRESSION:  1. 5 cm x 4.9 cm masslike partial consolidation of the superior segment of the  right lower lobe. 2. Right paratracheal, subcarinal and right hilar  lymphadenopathy.  3. Bronchoscopy and/or CT PET scan can be performed for further evaluation, as  indicated.    Psychosocial: She is not . She has one son named Gianni Claire. She has 5 siblings in total. She has been living with her brother Ellen Sr since her stroke in 2017. Her physical function has been relatively good since her stroke, but she has had cognitive impairment since her stroke in 2017. She is able to walk at baseline. She knows her name and where she lives but is not able to comprehend information and have complex discussions. She enjoys music, especially R&B and Gospel. 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 She is part of the [employer] program. Lorne and Orin are looking into other options for her living.     PALLIATIVE 
Palliative Medicine  Patient Name: Lillie Claire  YOB: 1955  MRN: [id number]  Age: 68 y.o.  Gender: female    Date of Initial Consult: 4/4/2024  Date of Service: 4/23/2024  Time: 12:30 PM  Provider: Ciara Kay MD  Hospital Day: 22  Admit Date: 4/2/2024  Referring Provider: Dr. Rodas       Reasons for Consultation:  Goals of Care    HISTORY OF PRESENT ILLNESS (HPI):   Lillie Claire is a 68 y.o. female with HTN, HLD, Bipolar disease, cognitive impairment related to previous CVA who was admitted on 4/2/2024 from home with a diagnosis of severe sepsis, acute encephalopathy, abnormal CT of the chest, pneumonia, hyponatremia. She is receiving antibiotics. Pulmonology was consulted--recommend repeat chest CT in 4-6 weeks after completion of antibiotics to re-assess the mass-like lesion. Nephrology was consulted for hyponatremia suspected d/t IVVD, poor PO intake. SLP attempted to evaluate patient but patient refused to participate.    4/3/2024 CT C/A/P:  IMPRESSION:  1. 5 cm x 4.9 cm masslike partial consolidation of the superior segment of the  right lower lobe. 2. Right paratracheal, subcarinal and right hilar  lymphadenopathy.  3. Bronchoscopy and/or CT PET scan can be performed for further evaluation, as  indicated.    Psychosocial: She is not . She has one son named Gianni Claire. She has 5 siblings in total. She has been living with her brother Ellen Sr since her stroke in 2017. Her physical function has been relatively good since her stroke, but she has had cognitive impairment since her stroke in 2017. She is able to walk at baseline. She knows her name and where she lives but is not able to comprehend information and have complex discussions. She enjoys music, especially R&B and Gospel. 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 Lorne and Orin are looking into other options for her living.     Interim hx : 
Palliative Medicine  Per Dr. Fonseca: Lillie Claire is a 68 y.o. female with HTN, HLD, Bipolar disease, cognitive impairment related to previous CVA who was admitted on 2024 from home with a diagnosis of severe sepsis, acute encephalopathy, abnormal CT of the chest, pneumonia, hyponatremia. She is receiving antibiotics. Pulmonology was consulted--recommend repeat chest CT in 4-6 weeks after completion of antibiotics to re-assess the mass-like lesion. Nephrology was consulted for hyponatremia suspected d/t IVVD, poor PO intake. SLP attempted to evaluate patient but patient refused to participate.   4/3/2024 CT C/A/P:  IMPRESSION:  1. 5 cm x 4.9 cm masslike partial consolidation of the superior segment of the  right lower lobe. 2. Right paratracheal, subcarinal and right hilar  lymphadenopathy.  3. Bronchoscopy and/or CT PET scan can be performed for further evaluation, as  indicated.     Code Status: Full Code     [facility]/5/2024     1:05 PM   Demographics   Marital Status Single   No AMD on file. Patient has 1 son, Gianni, who as her legal nok would be her primary HCDM. He is deferring to patient's brother Lorne, who with his wife Orin have been caring for patient since her CVA a few years ago.      Patient / Family Encounter Documentation     Participants (names): Lillie Claire, assigned nurse,Irma Combs, [facility]     Narrative: Palliative SW reviewed chart and checked in on patient, who was alert and expressed that she was hungry, but didn't like the hospital food. Patient was pleasant and cooperative. Her nurse, Virginia was setting her up to draw blood so this writer offered to play some Gospel music for patient, who was calling on the Lord. When asked if she saw her family over the weekend, she smiled and said she had seen her son Gianni and her brother Lorne (\"Truong\"). This writer called her son Gianni, who said he was able to get updates from doctors over the weekend and he 
Palliative Medicine  Per Dr. Fonseca: Lillie Claire is a 68 y.o. female with HTN, HLD, Bipolar disease, cognitive impairment related to previous CVA who was admitted on 2024 from home with a diagnosis of severe sepsis, acute encephalopathy, abnormal CT of the chest, pneumonia, hyponatremia. She is receiving antibiotics. Pulmonology was consulted--recommend repeat chest CT in 4-6 weeks after completion of antibiotics to re-assess the mass-like lesion. Nephrology was consulted for hyponatremia suspected d/t IVVD, poor PO intake. SLP attempted to evaluate patient but patient refused to participate.   4/3/2024 CT C/A/P:  IMPRESSION:  1. 5 cm x 4.9 cm masslike partial consolidation of the superior segment of the  right lower lobe. 2. Right paratracheal, subcarinal and right hilar  lymphadenopathy.  3. Bronchoscopy and/or CT PET scan can be performed for further evaluation, as  indicated.     Code Status: Full Code    [facility]/5/2024     1:05 PM   Demographics   Marital Status Single   No AMD on file. Patient has 1 son, Gianni, who as her legal nok would be her primary HCDM.     Patient / Family Encounter Documentation    Participants (names): Lillie ClaireST, Dr. Fonseca, assigned nurse, Irma Duckworth LCSW    Narrative: Palliative team met with patient, who was concluding session with  as we joined in. As Dr. Fonseca was getting update from , this writer introduced myself and offered words of comfort to patient, who appeared anxious and irritated as evidenced by her tone of voice and facial expression. As this writer was asking her about her family, she started to become tearful, saying, \"I'm tired,\" so this writer expressed respect for her feelings and privacy and the fact that there were several people in the room at the time. MT approached and was agreeable to coming back a little later if possible, as her nurse was giving her medications.  We called patient's brother and sister-in-law 
Palliative Medicine SW Note    LCSW called and left  for brother/primary HCDM Lorne Claire, requested return call to schedule GOC meeting in person or by phone. Oncology input is appreciated.    Thank you for including Palliative team in the care of Ms. Lillie Claire.    Irma Duckworth, [facility]  Palliative Medicine [phone]  
Palliative Medicine SW Note    This writer called brother Lorne after review of chart. He shared that he was able to visit patient over the weekend with their brother from [place]. He is aware that patient has cancer diagnosis and that \"doctors are getting together to come up with a plan\" for her treatment. He has transportation challenges so this writer gave him the [facility]LINK resource and he is working on getting to the hospital. He will call this afternoon and if unable to make it today, he will arrange a time for tomorrow morning to meet with Dr. Kay, Palliative SW, and oncologist to review patient's condition and GOC/treatment options.    This writer stopped in to see patient and she was resting very comfortably, so did not disturb, but she awakened when nurses came in and her phone rang. Patient was alert to self, pleasant, was able to talk on the phone when it was handed it to her. LCSW left Palliative Medicine business card and let her know her brother is working on transportation to come see her.    Thank you for including Palliative team in the care of Ms. Lillie Claire.     Irma Duckworth, [facility]  Palliative Medicine [phone]  
Palliative medicine was re-consulted due to decline in the patient's condition.  Called and left message for the patient's medical decision maker, Lorne.  No return call.  Will ask colleague to follow up tomorrow.   
Palliative note:      I visited patient this afternoon. She was awake. She attempted to answer my questions but repeated herself and ultimately didn't say much besides that she is feeling okay today.  I spoke with bedside RN regarding patient care. Discussed that patient had oxygen sats that were fluctuating while I was in the room, and RN went to assess patient and titrate oxygen.  I attempted to reach Shiva at the numbers on file. I received VM for two numbers and a not in service message for the [phone] number.  I called Orin again. Introduced myself and the role of Palliative.  Orin gave me the correct number for Lorne and told me the other numbers were old--I have updated the contact information.  Lorne joined Orin on the phone. They provided the social history above.  Shiva are able to discuss events of hospitalization and their previous conversation with Dr. Kinney.  Shiva wish to continue current treatments for patient. They are open to rehab after discharge. They are interested in long-term care for patient.  Shiva state there is not a written AMD for patient. Discussed legal NOK hierarchy for determining healthcare surrogate decision maker. They say her son Gianni is not responsible and has expressed that he does not wish to be involved in his mother's care. They also say her other siblings are unable to participate in her care. Acknowledged that they have certainly done a lot to help Ms. Claire over the years but that we may need to discuss including other family members in future discussions if they wish to be involved. They indicate that other family members will not want to be involved in her care based on their past behaviors.  Shiva offer that they \"want to do everything\" if her heart stops. We will try to discuss in more detail in a follow up conversation.  Our team will try to follow up with family 
Patient agitated sitting at the edge of the bed attempting to pull everything off. HR up to 130s. Patient reoriented to place and situation. Patient had pulled off external catheter and brief was wet. Patient changed and cleaned up. Patient dressed in new gown. EKG leads reapplied to patient as some were missing. Patient provided with new linens and a warm blanket. HR went down to low 100s (currently 110).   
Patient arrived to endo department for scheduled procedure. Patient placed on monitor at this time, 02 97% on 1LNC. Patient lethargic but alert, unable to state name and  at this time. Patient feels warm- temperature checked at this time and is 99.7. Dr. Liu with anesthesia aware- okay to proceed.     Patient unable to answer any pre-op questions, consents obtained prior to arrival by primary RN and RACHEL- ISAAC LOCKHART.     Patient offered warm blanket, patient shook head but no verbal response at this time.     1406- PIV is infiltrated- new PIV placed under US with Dr. Liu, anesthesia.    
Patient has not been able to provide stool sample for c-diff r/o.  Notified Dr. Kinney.  C-Diff testing canceled.  Enteric precautions lifted.   
Patient seen in the endoscopy as concern for rash.  She has macular rash on her back, right thigh, which was noted when she came down to [facility], before receiving any anesthesia or medication.    Likely has urticaria  Will give benadryl 25 iv x 1 and oral scheduled and also prednisone.    Doesn't have any facial, mouth rash or swelling currently, if she develops this, will need epinephrine stat.  
Pharmacy Antimicrobial Kinetic Dosing    Indication for Antimicrobials:  PNA?    Current Regimen of Each Antimicrobial:  Vancomycin - Pharmacy to Dose - Restart  - Day 1    Previous Antimicrobial Therapy:  Vancomycin - pharmacy dosing; Start Date 4/3; Day # 2  Zosyn 4.5 g IV then 3.375 g IV Q8H; Start Date 4/3; Day # 2    Goal Level: Vancomycin -600    Date Dose & Interval Measured (mcg/mL) Predicted AUC   24 0411 750 mg IV Q12H 11.1 385                 Significant Cultures:   4/3 - bcx pending  4/3 - MRSA screen - Negative    Labs:  Recent Labs     Units 24  0220 24  1156 24  0534   CREATININE MG/DL 0.61  --  0.75   BUN MG/DL 7  --  9   WBC K/uL 9.2 12.3* 5.9   BANDS % 2  --   --      Temp (24hrs), Av.2 °F (36.8 °C), Min:97.3 °F (36.3 °C), Max:100.1 °F (37.8 °C)    Conditions for Dosing Consideration: None    Creatinine Clearance (mL/min): Estimated Creatinine Clearance: 83 mL/min (based on SCr of 0.61 mg/dL).       Impression/Plan:   Reload vancomycin and resume previous regimen of 1000 mg IV Q12H  Zosyn dosing okay  BMP daily  Antimicrobial stop date TBD     Pharmacy will follow daily and adjust medications as appropriate for renal function and/or serum levels.    Thank you,  Karan Yates [facility]        
Pharmacy Antimicrobial Kinetic Dosing    Indication for Antimicrobials:  PNA?    Current Regimen of Each Antimicrobial:  Vancomycin - Pharmacy to Dose - Restart  - Day 3  Piperacillin-Tazobactam 3.375 g IV Q8H Start Date 4/3; Day # 8    Previous Antimicrobial Therapy:  Vancomycin - pharmacy dosing; Start Date 4/3; Day # 2  Zosyn 4.5 g IV then 3.375 g IV Q8H; Start Date 4/3; Day # 2    Goal Level: Vancomycin -600    Date Dose & Interval Measured (mcg/mL) Predicted AUC   24 0411 750 mg IV Q12H 11.1 385   4/10 0121 1000 mg IV Q12H 20.8 548           Significant Cultures:   4/3 - bcx NG  4/3 - MRSA screen - Negative    Labs:  Recent Labs     Units 04/10/24  0121 24  0418 24  0220 24  1156   CREATININE MG/DL 0.76 0.68 0.61  --    BUN MG/DL 7 9 7  --    PROCAL ng/mL  --  0.52  --   --    WBC K/uL 10.9 9.1 9.2 12.3*   BANDS % 1 3 2  --      Temp (24hrs), Av.1 °F (36.7 °C), Min:97.4 °F (36.3 °C), Max:100.1 °F (37.8 °C)    Conditions for Dosing Consideration: None    Creatinine Clearance (mL/min): Estimated Creatinine Clearance: 67 mL/min (based on SCr of 0.76 mg/dL).       Impression/Plan:   Adjust vancomycin to 750 mg IV Q12H for estimated AUC of 548  Continue Piperacillin-Tazobactam for now.   dosing okay  BMP daily  Antimicrobial stop date TBD     Pharmacy will follow daily and adjust medications as appropriate for renal function and/or serum levels.    Thank you,  Karan Yates [facility]              
Pharmacy Antimicrobial Kinetic Dosing    Indication for Antimicrobials:  PNA?    Current Regimen of Each Antimicrobial:  Vancomycin - Pharmacy to Dose - Restart  - Day 5 of 7  Piperacillin-Tazobactam 3.375 g IV Q8H Start Date 4/3; Day # 10    Previous Antimicrobial Therapy:  Vancomycin - pharmacy dosing; Start Date 4/3; Day # 2  Zosyn 4.5 g IV then 3.375 g IV Q8H; Start Date 4/3; Day # 2    Goal Level: Vancomycin -600    Date Dose & Interval Measured (mcg/mL) Predicted AUC   24 0411 750 mg IV Q12H 11.1 385   4/10 0121 1000 mg IV Q12H 20.8 548    0336 750mg q 12  8.4 460     Significant Cultures:   4/3 - bcx NG  4/3 - MRSA screen - Negative    Labs:  Recent Labs     Units 24  0336 24  0551 04/10/24  1844 04/10/24  0121   CREATININE MG/DL  --   --   --  0.76   BUN MG/DL  --   --   --  7   WBC K/uL 10.4 10.7 9.8 10.9   BANDS %  --   --   --  1     Temp (24hrs), Av.6 °F (37 °C), Min:97.4 °F (36.3 °C), Max:99.8 °F (37.7 °C)    Conditions for Dosing Consideration: None    Creatinine Clearance (mL/min): Estimated Creatinine Clearance: 63 mL/min (based on SCr of 0.76 mg/dL).       Impression/Plan:   Patient still with fevers.  Vancomycin level is therapeutic with projected , continue current regimen of 750mg q 12   Continue Piperacillin-Tazobactam for now.  BMP daily  Antimicrobial stop date TBD     Pharmacy will follow daily and adjust medications as appropriate for renal function and/or serum levels.    Thank you,  Jodie Hurst [facility]                  
Pharmacy Antimicrobial Kinetic Dosing    Indication for Antimicrobials:  PNA?    Current Regimen of Each Antimicrobial:  Vancomycin - Pharmacy to Dose - Restart  - Day 7 of 8  Piperacillin-Tazobactam 3.375 g IV Q8H Start Date 4/3; Day # 12    Previous Antimicrobial Therapy:  Vancomycin - pharmacy dosing; Start Date 4/3; Day # 2  Zosyn 4.5 g IV then 3.375 g IV Q8H; Start Date 4/3; Day # 2    Goal Level: Vancomycin -600    Date Dose & Interval Measured (mcg/mL) Predicted AUC   24 0411 750 mg IV Q12H 11.1 385   4/10 0121 1000 mg IV Q12H 20.8 548    0336 750mg q 12  8.4 460     Significant Cultures:   4/3 - bcx NG  4/3 - MRSA screen - Negative    Labs:  Recent Labs     Units 24  0232 24  1339 24  0623 24  2155 24  1243   CREATININE MG/DL 0.87  --   --   --   --    BUN MG/DL 12  --   --   --   --    WBC K/uL  --  10.4 10.8 11.2* 9.9     Temp (24hrs), Av.8 °F (36.6 °C), Min:97.4 °F (36.3 °C), Max:98 °F (36.7 °C)    Conditions for Dosing Consideration: None    Creatinine Clearance (mL/min): Estimated Creatinine Clearance: 50 mL/min (based on SCr of 0.87 mg/dL).       Impression/Plan:   Patient afebrile  Vancomycin level is therapeutic with projected , continue current regimen of 750mg q 12   Continue Piperacillin-Tazobactam for now.  BMP daily  Antimicrobial stop date      Pharmacy will follow daily and adjust medications as appropriate for renal function and/or serum levels.    Thank you,  Mac Hensley RP      
Pharmacy Antimicrobial Kinetic Dosing    Indication for Antimicrobials:  PNA?    Current Regimen of Each Antimicrobial:  Vancomycin - Pharmacy to Dose - Restart  - Day 7 of 8  Piperacillin-Tazobactam 3.375 g IV Q8H Start Date 4/3; Day # 12    Previous Antimicrobial Therapy:  Vancomycin - pharmacy dosing; Start Date 4/3; Day # 2  Zosyn 4.5 g IV then 3.375 g IV Q8H; Start Date 4/3; Day # 2    Goal Level: Vancomycin -600    Date Dose & Interval Measured (mcg/mL) Predicted AUC   24 0411 750 mg IV Q12H 11.1 385   4/10 0121 1000 mg IV Q12H 20.8 548    0336 750mg q 12  8.4 460    1434 750 q 12 h 8.9 488     Significant Cultures:   4/3 - bcx NG  4/3 - MRSA screen - Negative    Labs:  Recent Labs     Units 24  1434 24  0232 24  1339 24  0623 24  2155   CREATININE MG/DL 0.83 0.87  --   --   --    BUN MG/DL 12 12  --   --   --    WBC K/uL 12.4*  --  10.4 10.8 11.2*     Temp (24hrs), Av.8 °F (36.6 °C), Min:97.4 °F (36.3 °C), Max:98.1 °F (36.7 °C)    Conditions for Dosing Consideration: None    Creatinine Clearance (mL/min): Estimated Creatinine Clearance: 53 mL/min (based on SCr of 0.83 mg/dL).       Impression/Plan:   Patient afebrile  Vancomycin level is therapeutic with projected , continue current regimen of 750mg q 12   Continue Piperacillin-Tazobactam for now.  BMP daily  Antimicrobial stop date      Pharmacy will follow daily and adjust medications as appropriate for renal function and/or serum levels.    Thank you,  Kartik Mendoza, [facility]        
Pharmacy Antimicrobial Kinetic Dosing    Indication for Antimicrobials:  PNA?    Current Regimen of Each Antimicrobial:  Vancomycin - pharmacy dosing; Start Date 4/3; Day # 2  Zosyn 4.5 g IV then 3.375 g IV Q8H; Start Date 4/3; Day # 2    Previous Antimicrobial Therapy:  NA     Goal Level: Vancomycin -600    Date Dose & Interval Measured (mcg/mL) Predicted AUC   24 0411 750 mg IV Q12H 11.1 385                 Significant Cultures:   4/3 - bcx pending  4/3 - MRSA screen - In process    Labs:  Recent Labs     Units 24  0411 24  2135 24  0342 24  0246 24  0124   CREATININE MG/DL 0.57 0.69 0.6  --  1.03*   BUN MG/DL 7 8  --   --  11   PROCAL ng/mL  --   --   --  1.06  --    WBC K/uL 6.7  --   --   --  9.5     Temp (24hrs), Av.7 °F (37.6 °C), Min:97.9 °F (36.6 °C), Max:102.8 °F (39.3 °C)    Conditions for Dosing Consideration: None    Creatinine Clearance (mL/min): Estimated Creatinine Clearance: 89 mL/min (based on SCr of 0.57 mg/dL).       Impression/Plan:   Increase vancomycin to 1000 mg IV Q12H for estimated AUC of 509  Zosyn dosing okay  BMP daily  Antimicrobial stop date TBD     Pharmacy will follow daily and adjust medications as appropriate for renal function and/or serum levels.    Thank you,  Karan Yates [facility]      
Pharmacy Antimicrobial Kinetic Dosing    Indication for Antimicrobials: HAP     Current Regimen of Each Antimicrobial:  Vancomycin - pharmacy dosing; Start Date ; Day # 1  Zosyn 4.5 g IV once then 3.375 g IV q8H; Start Date ; Day # 1    Previous Antimicrobial Therapy:     Goal Level: Vancomycin -600    Date Dose & Interval Measured (mcg/mL) Predicted AUC                       Significant Cultures:   Blood- pending  4/3 MRSA not present  Procalcitonin 1.37    Labs:  Recent Labs     Units 24  1607 24  0935   CREATININE MG/DL  --  1.20*   BUN MG/DL  --  39*   PROCAL ng/mL 1.37  --    WBC K/uL 18.6*  --      Temp (24hrs), Av.7 °F (37.6 °C), Min:97.7 °F (36.5 °C), Max:101.5 °F (38.6 °C)      Conditions for Dosing Consideration: None    Creatinine Clearance (mL/min): Estimated Creatinine Clearance: 37 mL/min (A) (based on SCr of 1.2 mg/dL (H)).       Impression/Plan:   Vanc 1500 mg x1 then 1000 mg IV Q24H, AUC at   Zosyn okay  BMP x7days  Antimicrobial stop date 7 days     Pharmacy will follow daily and adjust medications as appropriate for renal function and/or serum levels.    Thank you,  Jacob Tay, [facility]    
Pharmacy Antimicrobial Kinetic Dosing    Indication for Antimicrobials: HAP     Current Regimen of Each Antimicrobial:  Vancomycin - pharmacy dosing; Start Date ; Day # 2  Zosyn 4.5 g IV once then 3.375 g IV q8H; Start Date ; Day # 2    Previous Antimicrobial Therapy:     Goal Level: Vancomycin -600    Date Dose & Interval Measured (mcg/mL) Predicted AUC                       Significant Cultures:    blood: NGTD    Labs:  Recent Labs     Units 24  0150 24  1607 24  0935   CREATININE MG/DL 1.42*  --  1.20*   BUN MG/DL 44*  --  39*   PROCAL ng/mL  --  1.37  --    WBC K/uL 16.1* 18.6*  --      Temp (24hrs), Av.5 °F (37.5 °C), Min:98.5 °F (36.9 °C), Max:101.4 °F (38.6 °C)      Conditions for Dosing Consideration: None    Creatinine Clearance (mL/min): Estimated Creatinine Clearance: 31 mL/min (A) (based on SCr of 1.42 mg/dL (H)).       Impression/Plan:   Vanc 1500 mg   Renal function worsened today so will reduce vanc dose to 750 mg q24h for projected AUC of 512  Vanc level 5/3 PM  Zosyn dose appropriate  Ordered repeat MRSA screen  Daily BMP ordered  Antimicrobial stop date 7 days     Pharmacy will follow daily and adjust medications as appropriate for renal function and/or serum levels.    Thank you,  Quincy Elaine [facility]      
Pharmacy Antimicrobial Kinetic Dosing    Indication for Antimicrobials: HAP     Current Regimen of Each Antimicrobial:  Vancomycin - pharmacy dosing; Start Date ; Day # 4  Zosyn 4.5 g IV once then 3.375 g IV q8H; Start Date ; Day # 4    Previous Antimicrobial Therapy:     Goal Level: Vancomycin -600    Date Dose & Interval Measured (mcg/mL) Predicted AUC   5/3  13.7    5/5 750mg q 24h             Significant Cultures:    blood: NGTD    Labs:  Recent Labs     Units 24  0318 243 24  0523 24  0150 24  1607 24  1607   CREATININE MG/DL 0.87 1.01 1.22* 1.42*   < >  --    BUN MG/DL 23* 27* 38* 44*   < >  --    PROCAL ng/mL  --   --   --   --   --  1.37   WBC K/uL 15.5* 15.3* 15.2* 16.1*  --  18.6*    < > = values in this interval not displayed.     Temp (24hrs), Av.8 °F (37.1 °C), Min:97.9 °F (36.6 °C), Max:99.5 °F (37.5 °C)      Conditions for Dosing Consideration: None    Creatinine Clearance (mL/min): Estimated Creatinine Clearance: 50 mL/min (based on SCr of 0.87 mg/dL).       Impression/Plan:   Renal function improved today  Continue vanc 750 mg q24h  Vanc level  prior to 2100 dose  Zosyn dose appropriate  Ordered repeat MRSA screen  Daily BMP ordered  Antimicrobial stop date 7 days     Pharmacy will follow daily and adjust medications as appropriate for renal function and/or serum levels.    Thank you,  Mac Hensley RPH          
Pharmacy Antimicrobial Kinetic Dosing    Indication for Antimicrobials: HAP     Current Regimen of Each Antimicrobial:  Vancomycin - pharmacy dosing; Start Date ; Day # 5  Zosyn 4.5 g IV once then 3.375 g IV q8H; Start Date ; Day # 5    Previous Antimicrobial Therapy:     Goal Level: Vancomycin -600    Date Dose & Interval Measured (mcg/mL) Predicted AUC   5/3  13.7    5 750mg q 24h 10.5 338           Significant Cultures:    blood: NGTD    Labs:  Recent Labs     Units 24  0650 24  0038 24  0318 24  2053   CREATININE MG/DL  --  0.84 0.87 1.01   BUN MG/DL  --  12 23* 27*   WBC K/uL 13.9* 15.8* 15.5* 15.3*   BANDS % 2  --   --   --      Temp (24hrs), Av.6 °F (37 °C), Min:97.8 °F (36.6 °C), Max:101.2 °F (38.4 °C)      Conditions for Dosing Consideration: None    Creatinine Clearance (mL/min): Estimated Creatinine Clearance: 52 mL/min (based on SCr of 0.84 mg/dL).       Impression/Plan:   Renal function improved today  Vancomycin level subtherapeutic, increase dose to 1000mg q 18h  Vanc level   Zosyn dose appropriate  Ordered repeat MRSA screen  Daily BMP ordered  Antimicrobial stop date 7 days     Pharmacy will follow daily and adjust medications as appropriate for renal function and/or serum levels.    Thank you,  Mac Hensley RPH      
Pharmacy Antimicrobial Kinetic Dosing    Indication for Antimicrobials: HAP     Current Regimen of Each Antimicrobial:  Vancomycin - pharmacy dosing; Start Date ; Day # 6  Zosyn 4.5 g IV once then 3.375 g IV q8H; Start Date ; Day # 6    Previous Antimicrobial Therapy:     Goal Level: Vancomycin -600    Date Dose & Interval Measured (mcg/mL) Predicted AUC   5/3  13.7    5/5 750mg q 24h 10.5 338   5/7 1000 mg q18h 13.9 538     Significant Cultures:    blood: NGTD    Labs:  Recent Labs     Units 24  0954 24  0404 24  0650 24  0650 24  0038   CREATININE MG/DL  --  0.74 0.74  --  0.84   BUN MG/DL  --  16 10  --  12   PROCAL ng/mL  --   --  0.29  --   --    WBC K/uL 12.2*  --   --  13.9* 15.8*   BANDS %  --   --   --  2  --      Temp (24hrs), Av.8 °F (37.1 °C), Min:98.2 °F (36.8 °C), Max:99.7 °F (37.6 °C)      Conditions for Dosing Consideration: None    Creatinine Clearance (mL/min): Estimated Creatinine Clearance: 59 mL/min (based on SCr of 0.74 mg/dL).       Impression/Plan:   Vancomycin level of 13.9 is therapeutic. Continue vanc 1000 mg q18h  Zosyn dose appropriate  Daily BMP ordered through   Antimicrobial stop date      Pharmacy will follow daily and adjust medications as appropriate for renal function and/or serum levels.    Thank you,  Quincy Elaine [facility]        
Pharmacy Antimicrobial Kinetic Dosing    Indication for Antimicrobials: sepsi     Current Regimen of Each Antimicrobial:  Vancomycin - pharmacy dosing; Start Date 4/3; Day # 1  Zosyn 4.5 g IV then 3.375 g IV Q8H; Start Date 4/3; Day # 1    Previous Antimicrobial Therapy:  NA     Goal Level: Vancomycin -600    Date Dose & Interval Measured (mcg/mL) Predicted AUC                       Significant Cultures:   4/3 - bcx pending    Labs:  Recent Labs     Units 24  0124 24  0122   CREATININE MG/DL 1.03* 0.5*   BUN MG/DL 11  --    WBC K/uL 9.5  --      Temp (24hrs), Av.5 °F (38.6 °C), Min:101.5 °F (38.6 °C), Max:101.5 °F (38.6 °C)    Conditions for Dosing Consideration: None    Creatinine Clearance (mL/min): Estimated Creatinine Clearance: 49 mL/min (A) (based on SCr of 1.03 mg/dL (H)).       Impression/Plan:   Vancomycin 1750 mg IV once then 750 mg IV Q12H, expected   Zosyn dosing okay  BMP daily  Antimicrobial stop date TBD     Pharmacy will follow daily and adjust medications as appropriate for renal function and/or serum levels.    Thank you,  Jacob Tay [facility]    
Physical Therapy  Chart reviewed. Hgb is now 6.8 and patient awaiting blood transfusion. Will defer therapy today and follow back tomorrow as appropriate.  Thank you,  Lillie Prado, PT    
Physical Therapy  Consult received and chart reviewed. Patient noted to have Hgb of 6.9. nursing present to re-draw labs. Will defer therapy at this time and continue to follow.  Thank you,  Lillie Prado, PT    
Physical Therapy Note:    11:28 PT treatment deferred. Pt is receiving treatment from another service and is not available to participate.    11:59 Pt adamantly declining participation in PT intervention. Offered encouragement and education regarding importance of participation. She remains unwilling to engage in PT interventions.  present on PT exit.    Will continue to follow per POC.    Kirsten Abarca, PT, DPT, CEEAA    
Physical Therapy:    Attempted to see this pt for PT session. Pt resting comfortably and sleeping however easy to arouse. Began set up for bed mobility however pt became increasingly agitated and frustrated, requesting to lay back down. Redirected pt and positioned back to comfort. Pt positioned to comfort semi fowlers in bed, all needs within reach. Aborted this session and will attempt again as appropriate. Thanks.    Feli Bolivar, PTA    10:54AM update:   Attempted second time to see pt for PT session. Pt continues to become agitated upon awakening with inability to stay awake despite multi modal stimulus. Pt refusing any and all mobility this date despite max encouragement and education regarding importance of continued mobility. Will defer and continue to follow as appropriate. Thanks.    Feli Bolivar, PTA  
Pill Cam (Capsule) Progress Note    Capsule education was completed by:  AbimaelRNC.  Patient verbalizes understanding.    Capsule consent form obtained from eliu over the phone with witness-MORGAN Wood.    Capsule sensor leads were applied.    Capsule swallowed or placed without difficulty.    Patient condition post procedure:  Patient is alert and oriented and verbalizes no complaints at this time.    Post procedure instructions:    Time capsule was ingested (start of procedure): 1425    Time patient may have clear liquids:  1625    Time patient may have light snack/meal: 1825    Time patient's capsule endoscopy procedure is completed: 2225    Equipment may be removed at 2225 and placed at beside for Endoscopy to .     Call for any questions ext. [phone]    
Pill Cam (Capsule) Progress Note    Capsule sensor leads were applied.    Capsule swallowed or placed during EGD without difficulty.    Post procedure instructions:    Time capsule was ingested (start of procedure): 1438    Time patient may have clear liquids (4hr):  1838    Time patient may have light snack/meal (6hr): 2038    Time patient's capsule endoscopy procedure is completed: 2238    Equipment may be removed at 2238 and placed at beside for Endoscopy to .     Call for any questions ext. [phone]    
Pt came to unit from [facility] at 1852. HR sustaining 150s, RR 25, o2 90% -- put on 2L NC, pt febrile in [facility] at 1746 with 100.6 F. IV fluid bolus to be given but IV infiltrated upon patient arrival to [facility]. Anesthesia called to bedside by [facility] charge nurse for placement of a central line. Unable to give IV fluid bolus or IV metop at this time d/t no IV access. Shift change report given to night shift nurse Olvin.   
Pt irritated this morning, after much stimulation. Pt not agreeable to taking medication and becoming aggressive. Try again a little later.   
Pt not participating with therapy. Noted palliative is being consulted. Will sign off.   
RAPID RESPONSE TEAM    Overhead rapid response paged to room # 2326/01  at 0023    Reason for rapid response: A Fib RVR     Initial assessment:   Upon arrival patient is A&Ox1 and follows some commands which is her baseline. Patient has no complaints of chest pain at this time. EKG being completed on arrival. Patient's vital signs are as follows; HR:146(A Fib), BP:163/107, RR:38, O2 sat: 91 (4L NC), T: 101.2. Upon auscultation, patient has rhonchi bilaterally. Patient using accessory muscles to breathe.    MARILIA Diez at bedside, orders received for the following Interventions:   -12 lead EKG  -10mg IV diltiazem once  -Restart diltiazem drip at 5 mg/hr  -20mg IV lasix once  -STAT chest xray  -BIPAP  -PRN nebs and chest PT  -CBC, BMP, Mag, Phos    Outcome:   pt to remain in room # 2326/01     0230: Rounded on patient and patient HR now in the 90s and work of breathing has improved. Electrolytes replaced by MARILIA Diez.    Please call with any questions or concerns    Angely Alonzo RN  Rapid Response Team  Ext [phone]     Recent Results (from the past 8 hour(s))   Vancomycin Level, Random    Collection Time: 05/04/24  9:40 PM   Result Value Ref Range    Vancomycin Rm 10.5 UG/ML   POCT Glucose    Collection Time: 05/05/24 12:28 AM   Result Value Ref Range    POC Glucose 154 (H) 65 - 117 mg/dL    Performed by: Andrei Arreola RN    Basic Metabolic Panel    Collection Time: 05/05/24 12:38 AM   Result Value Ref Range    Sodium 143 136 - 145 mmol/L    Potassium 3.1 (L) 3.5 - 5.1 mmol/L    Chloride 111 (H) 97 - 108 mmol/L    CO2 26 21 - 32 mmol/L    Anion Gap 6 5 - 15 mmol/L    Glucose 159 (H) 65 - 100 mg/dL    BUN 12 6 - 20 MG/DL    Creatinine 0.84 0.55 - 1.02 MG/DL    Bun/Cre Ratio 14 12 - 20      Est, Glom Filt Rate 75 >60 ml/min/1.73m2    Calcium 8.9 8.5 - 10.1 MG/DL   CBC with Auto Differential    Collection Time: 05/05/24 12:38 AM   Result Value Ref Range    WBC 15.8 (H) 3.6 - 11.0 K/uL    RBC 3.05 (L) 3.80 - 5.20 
Rapid response for sinus tachycardia   HR went up to 170's , pt seen and reevaluated   Stat EKG   Iv metoprolol 5mg q6hprn   IVF bolus   Chk lactic acid   Discussed with rapid response RN   Patient upgraded to stepdown   
Responded to Rapid Response call and upon arrival patient received on 2L NC; Per RN RRT called due to increased HR. Spo2 peobe placed on right hand and sats reading 87% on 2L; increased O2 to 4L and sats improved to 94%. No additional intervention needed at this time.  
Shift report received from RN     Shift assessment complete, see flow sheet     0500: Np paged, Hbg 6.7    0540: Received call from the lab ([facility]), Blood will be sent to [place] due to antibodies, charge nurse informed     Shift report given to RN   
Speech pathology  Orders received, chart reviewed and note chest CT showing mass-like consolidation of the RLL and Ddx included infection, aspiration or malignancy. Cleared by RN to see patient. WBC WFL.   Attempted to see; however, patient adamantly refusing all PO trials despite max encouragement and rationale for swallow evaluation. Lunch tray arrived and she requested that this be left outside of her room.  Patient reporting she is just so tired of all of this. Note Palliative consulted.   Will continue to follow for eval as patient agreeable to PO.   Vandana Lay M.S. ARISTIDES-SLP      
Speech pathology note  Reviewed chart and note SLP re-consulted for \"r/o dysphagia.\" SLP has evaluated patient with most recent session 4/8 showing suspected baseline swallow function with recommendation for soft and bite sized/thin liquid diet. RN reported no decline in patient's swallow function, patient ate her breakfast with no difficulty, however appetite remains poor (which was noted by SLP as well.) Further SLP treatment not indicated at this time; SLP will sign off. Recommendations as below. Thank you.    Recommendations and Planned Interventions:  Diet: Soft and bite sized and thin liquids  --Medications as tolerated (may consider larger pills, whole with puree)  --Upright all PO intake   --Alternate solids & liquids   --Slow rate  --Oral hygiene 2-3x/day  --1:1 supervision/assistance       Tips for assisting people with dementia at mealtimes  Environmental modifications  · Ensure mealtimes are relaxed and unhurried; eat together to encourage participation  · Decrease distractions (e.g. turn off the TV, provide only the utensil needed the food item)  · Use smaller plates with color contrast between plate and food to increase attention  Feeding techniques  · Try offering smaller meals more frequently with high-calorie snacks, as needed  · Tastes change; people with dementia may prefer greater taste contrasts (e.g. sweet, spicy)  · Make eye contact and provide verbal cues to start and continue eating  · Maximize use of finger foods  · Assist with hand-over-hand feeding when needed  · Offer one item at a time and ensure bite has been swallowed prior to providing another    MAISHA Rhodes Ed., CCC-SLP   
Spiritual Care Assessment/Progress Note  [facility]    Name: Lillie Claire MRN: [id number]    Age: 68 y.o.     Sex: female   Language: English     Date: 4/29/2024            Total Time Calculated: 10 min              Spiritual Assessment begun in MRM 1 CLINICAL OBS  Service Provided For: Patient  Referral/Consult From: Rounding  Encounter Overview/Reason: Initial Encounter    Spiritual beliefs:      [] Involved in a brittaney tradition/spiritual practice:      [] Supported by a brittaney community:      [] Claims no spiritual orientation:      [] Seeking spiritual identity:           [] Adheres to an individual form of spirituality:      [x] Not able to assess:                Identified resources for coping and support system:   Support System: Unknown       [] Prayer                  [] Devotional reading               [] Music                  [] Guided Imagery     [] Pet visits                                        [] Other: (COMMENT)     Specific area/focus of visit   Encounter:    Crisis:    Spiritual/Emotional needs: Type: Spiritual Support  Ritual, Rites and Sacraments:    Grief, Loss, and Adjustments:    Ethics/Mediation:    Behavioral Health:    Palliative Care: Type: Palliative Care, Initial/Spiritual Assessment  Advance Care Planning:      Plan/Referrals: Continue to visit, (comment)    Narrative: Initial spiritual assessment with palliative pt in 1142. Pt appeared to be sleeping. Following chart review pronounced blessing out loud at bedside and pt awoke. Introduced self and role. Pt stated she was ok. Attempted to engage pt in conversation, pt did not respond or was difficult to understand. Extended words of comfort and a blessing to which pt smiled. Advised of availability of chaplains and stepped away so pt could continue resting.     
Spiritual Care Assessment/Progress Note  [facility]    Name: Lillie Claire MRN: [id number]    Age: 68 y.o.     Sex: female   Language: English     Date: 4/13/2024            Total Time Calculated: 14 min              Spiritual Assessment begun in MRM 2 PROGRESSIVE CARE  Service Provided For:: Patient not available  Referral/Consult From:: Rounding  Encounter Overview/Reason : Attempted Encounter, Palliative Care    Spiritual beliefs:      [] Involved in a brittaney tradition/spiritual practice:      [] Supported by a brittaney community:      [] Claims no spiritual orientation:      [] Seeking spiritual identity:           [] Adheres to an individual form of spirituality:      [x] Not able to assess:                Identified resources for coping and support system:   Support System: Unknown       [] Prayer                  [] Devotional reading               [] Music                  [] Guided Imagery     [] Pet visits                                        [] Other: (COMMENT)     Specific area/focus of visit   Encounter:    Crisis:    Spiritual/Emotional needs:    Ritual, Rites and Sacraments:    Grief, Loss, and Adjustments:    Ethics/Mediation:    Behavioral Health:    Palliative Care: Type: Palliative Care, Initial/Spiritual Assessment  Advance Care Planning:      Plan/Referrals: Continue to visit, (comment)    Narrative:   Reviewed chart prior to visit on PCU for spiritual assessment and support.  Patient appeared to be resting comfortably.  Unable to assess for spiritual needs or concerns.   available upon referral by staff or by patient/family request.      AISLINN العلي, BCC, Staff   [facility]     Paging Service [phone]        
Spiritual Care Assessment/Progress Note  [facility]    Name: Lillie Claire MRN: [id number]    Age: 68 y.o.     Sex: female   Language: English     Date: 4/22/2024            Total Time Calculated: 5 min              Spiritual Assessment begun in MRM 2 PROGRESSIVE CARE  Service Provided For:: Patient not available  Referral/Consult From:: Rounding  Encounter Overview/Reason : Attempted Encounter    Spiritual beliefs:      [] Involved in a brittaney tradition/spiritual practice:      [] Supported by a brittaney community:      [] Claims no spiritual orientation:      [] Seeking spiritual identity:           [] Adheres to an individual form of spirituality:      [x] Not able to assess:                Identified resources for coping and support system:   Support System: Family members       [] Prayer                  [] Devotional reading               [] Music                  [] Guided Imagery     [] Pet visits                                        [] Other: (COMMENT)     Specific area/focus of visit   Encounter:    Crisis:    Spiritual/Emotional needs:    Ritual, Rites and Sacraments:    Grief, Loss, and Adjustments:    Ethics/Mediation:    Behavioral Health:    Palliative Care: Type: Palliative Care, Initial/Spiritual Assessment  Advance Care Planning:      Plan/Referrals: Continue to visit, (comment)    Narrative: Attempted initial spiritual assessment with palliative pt in 2301. Following chart review found pt fast asleep. Pt did not respond to empathic touch or 's voice. Prayer at bedside on behalf of pt.   
TRANSFER - IN REPORT:    Verbal report received from MORGAN Garcia on Lillie Claire  being received from 2301 for ordered procedure      Report consisted of patient's Situation, Background, Assessment and   Recommendations(SBAR).     Information from the following report(s) Nurse Handoff Report, MAR, and Recent Results was reviewed with the receiving nurse.    Opportunity for questions and clarification was provided.      Assessment completed upon patient's arrival to unit and care assumed.     
TRANSFER - OUT REPORT:    Verbal report given to MELLO Elizabeth RN on Lillie Claire  being transferred to 2301 for routine post-op       Report consisted of patient's Situation, Background, Assessment and   Recommendations(SBAR).     Information from the following report(s) Surgery Report was reviewed with the receiving nurse.           Lines:   Peripheral IV 04/14/24 Distal;Right;Anterior Cephalic (Active)   Site Assessment Clean, dry & intact 04/17/24 1121   Line Status Flushed 04/17/24 1121   Line Care Connections checked and tightened 04/17/24 1121   Phlebitis Assessment No symptoms 04/17/24 1121   Infiltration Assessment 0 04/17/24 1121   Alcohol Cap Used Yes 04/17/24 1121   Dressing Status Clean, dry & intact 04/17/24 1121   Dressing Type Transparent 04/17/24 1121        Opportunity for questions and clarification was provided.  Patient continues to have red blotchy skin and starting to look like hives.      Patient will go to ultrasound then return to her room.    1522 Dr. Coles talking with MORGAN Elizabeth. To contact hospital doctor regarding rash.     
Telephone consent obtained by KORY Miles RN, FLORECITA Godoy RN with brother, LUISA, Lorne Claire   
This morning pt had fever of 101.5 which resolved with tylenol.  pt resistant to taking meds but was able to take all meds except  lasix and K+ due to decreased tolerance during medication administration.  Tylenol given.  On reassessment at 12:08 pt afebrile and BP 90/62.  Sent message to Dr. Lopez regarding holding Lasix and potassium due to BP and ok to hold order given. Provider aware of all above.  
Today's labs reviewed with Na stable. No changes made to regimen.  Nephrology will seen again on Monday. Please contact on call provider for questions or concerns.    
Vascular access team asked to assist in obtaining peripheral access and labs for this patient.  Bilateral upper extremities assessed with ultrasound.  All vessels too small or would not compress with pressure.   No vessels appropriate for PICC/ML/Extended dwell/PIV.  Primary nurse MORGAN Wood notified. Patient resting in bed, bed in lowest position, call bell in reach, side rails up x 2.    Yesi Saldaña RN, VA-BC  Vascular Access Team  
When positioning the patient it was noticed that she has blotchy red areas covering upper arms, back, hips, abdomen. Dr. Alberts informed. Nurse Jodie called, patient did not have these areas on assessment. Okay to continue with procedure per Dr. Alberts.   
Zosyn dose adjusted per renal fxn and extended-infusion protocol    From  3.375gm iv q12 to    4.5gm iv x1 then 3.375gm iv q8h x 7 days for HAP  
18.5-24.9)    Estimated Daily Nutrient Needs:  Energy Requirements Based On: Formula  Weight Used for Energy Requirements: Current  Energy (kcal/day): 1452 kcals (BMR x 1.3AF)  Weight Used for Protein Requirements: Current  Protein (g/day): 75g (1.2 g/kg bw)  Method Used for Fluid Requirements: 1 ml/kcal  Fluid (ml/day): 1450 mL    Nutrition Diagnosis:   Inadequate protein-energy intake related to cognitive or neurological impairment (decreased appetite) as evidenced by  (varying PO, off/on NPO status)    Nutrition Interventions:   Food and/or Nutrient Delivery: Modify Current Diet, Continue Oral Nutrition Supplement  Nutrition Education/Counseling: No recommendation at this time  Coordination of Nutrition Care: Continue to monitor while inpatient       Goals:  Previous Goal Met: Progressing toward Goal(s)  Goals: PO intake 50% or greater, by next RD assessment       Nutrition Monitoring and Evaluation:   Behavioral-Environmental Outcomes: None Identified  Food/Nutrient Intake Outcomes: Food and Nutrient Intake, Supplement Intake  Physical Signs/Symptoms Outcomes: Biochemical Data, Weight, Nutrition Focused Physical Findings    Discharge Planning:    Continue current diet, Continue Oral Nutrition Supplement     Bettina Morris RD  Contact: ext [phone]    
466 (H) 04/16/2024      Recent Labs     04/14/24  0232 04/14/24  1434 04/15/24  0256 04/15/24  1513 04/15/24  2315 04/16/24  0319   * 130* 131* 129* 131* 132*   K 3.7 2.9* 3.5  --   --   --    CL 91* 89* 94*  --   --   --    CO2 29 33* 29  --   --   --    GLUCOSE 91 85 93  --   --   --    BUN 12 12 13  --   --   --    CREATININE 0.87 0.83 0.86  --   --   --    CALCIUM 8.6 9.6 8.5  --   --   --          Recent Labs     04/15/24  0256 04/15/24  0402 04/15/24  1845 04/16/24 0319   WBC 11.7* 11.6*  --  10.4   RBC 2.29* 2.30*  --  2.39*   HGB 6.5* 6.7* 7.6* 6.7*   HCT 20.4* 20.5* 23.9* 21.8*   MCV 89.1 89.1  --  91.2   MCH 28.4 29.1  --  28.0   MCHC 31.9 32.7  --  30.7   RDW 18.5* 18.6*  --  18.0*   * 428*  --  466*   MPV 9.8 10.1  --  10.0       Lab Results   Component Value Date/Time    IRON 8 (L) 04/04/2024 11:08 AM    TIBC 228 (L) 04/04/2024 11:08 AM     No results found for: \"PTH\"  No results found for: \"LABA1C\"  Lab Results   Component Value Date/Time    COLORU YELLOW/STRAW 04/03/2024 12:26 AM    GLUCOSEU Negative 04/03/2024 12:26 AM    BILIRUBINUR Negative 04/03/2024 12:26 AM    KETUA Negative 04/03/2024 12:26 AM    SPECGRAV 1.009 04/03/2024 12:26 AM    BLOODU Negative 04/03/2024 12:26 AM    PROTEINU TRACE (A) 04/03/2024 12:26 AM    NITRU Negative 04/03/2024 12:26 AM    LEUKOCYTESUR Negative 04/03/2024 12:26 AM     US Results (most recent):  Medication list  reviewed  Current Facility-Administered Medications   Medication Dose Route Frequency    [START ON 4/17/2024] Vancomycin Level   Other RX Placeholder    0.9 % sodium chloride infusion   IntraVENous PRN    0.9 % sodium chloride infusion   IntraVENous PRN    sucralfate (CARAFATE) tablet 1 g  1 g Oral 4x Daily AC & HS    magic (miracle) mouthwash  5 mL Swish & Spit 4x Daily PRN    Vancomycin Level   Other RX Placeholder    vancomycin (VANCOCIN) 750 mg in sodium chloride 0.9 % 250 mL IVPB ([id number])  750 mg IntraVENous Q12H    potassium chloride 
Breakfast; Clear Liquid Oral Supplement  ADULT ORAL NUTRITION SUPPLEMENT; Lunch; Frozen Oral Supplement  DIET ONE TIME MESSAGE;  Passing flatus: YES  Tolerating current diet: NO - extreme reduction in appetite, will drink ensure but refuses most solids.       Pain Management:   Patient states pain is manageable on current regimen: YES    Skin:     Interventions: turn team, specialty bed, float heels, limit briefs, internal/external urinary devices, and nutritional support    Patient Safety:  Fall Score:    Interventions: bed/chair alarm and gripper socks       Length of Stay:  Expected LOS: 22  Actual LOS: 20      Tata Lemus RN                             
RN                           
g/dL    RDW 19.5 (H) 11.5 - 14.5 %    Platelets 381 150 - 400 K/uL    MPV 10.8 8.9 - 12.9 FL    Nucleated RBCs 0.1 (H) 0  WBC    nRBC 0.02 (H) 0.00 - 0.01 K/uL    Neutrophils % 78 (H) 32 - 75 %    Lymphocytes % 8 (L) 12 - 49 %    Monocytes % 10 5 - 13 %    Eosinophils % 2 0 - 7 %    Basophils % 0 0 - 1 %    Immature Granulocytes % 2 (H) 0.0 - 0.5 %    Neutrophils Absolute 11.9 (H) 1.8 - 8.0 K/UL    Lymphocytes Absolute 1.3 0.8 - 3.5 K/UL    Monocytes Absolute 1.5 (H) 0.0 - 1.0 K/UL    Eosinophils Absolute 0.3 0.0 - 0.4 K/UL    Basophils Absolute 0.0 0.0 - 0.1 K/UL    Immature Granulocytes Absolute 0.3 (H) 0.00 - 0.04 K/UL    Differential Type AUTOMATED     PREPARE RBC (CROSSMATCH), 1 Units    Collection Time: 05/03/24  9:30 PM   Result Value Ref Range    History Check Historical check performed         
nutritional support    Patient Safety:  Fall Score:    Interventions: bed/chair alarm, gripper socks, and pt to call before getting OOB       Length of Stay:  Expected LOS: 13  Actual LOS: 9      Nina Herrera RN                           
respiratory status is more stable      Right flank pain  UA - normal   Ct abdomen shows evidence of pneumonia with right sided masslike lesion  CT does not show any evidence of renal stone  Currently pain is improved      Medical Decision Making:   I personally reviewed labs: CBC, BMP  I personally reviewed imaging: CT of the chest and abdomen  I personally reviewed EKG:  Toxic drug monitoring: None  Discussed case with: GI regarding anemia and dark stool, pharmacy,         Code Status: Full code  DVT Prophylaxis: SCDs due to dark stool and anemia  Baseline: Unknown    Family-brother Lorne phone #[phone] is a power of      Subjective:     Chief Complaint / Reason for Physician Visit  She is sitting up in chair this morning.  Reports shortness of breath along with some cough.  Reports feeling dizzy.  Reports some dark stools as well  Overnight events noted      Objective:     VITALS:   Last 24hrs VS reviewed since prior progress note. Most recent are:  Patient Vitals for the past 24 hrs:   BP Temp Temp src Pulse Resp SpO2   04/08/24 1144 130/78 98.4 °F (36.9 °C) Oral 100 22 97 %   04/08/24 1134 133/80 -- -- 97 29 --   04/08/24 1100 -- -- -- 90 19 --   04/08/24 1027 -- -- -- 87 26 --   04/08/24 0924 130/80 -- -- (!) 104 28 --   04/08/24 0715 (!) 145/87 97.8 °F (36.6 °C) Oral 100 27 94 %   04/08/24 0700 -- -- -- 81 23 94 %   04/08/24 0245 130/77 98.1 °F (36.7 °C) Oral 86 25 92 %   04/07/24 2249 -- -- -- -- -- 93 %   04/07/24 2245 (!) 142/80 100.1 °F (37.8 °C) Axillary (!) 103 26 (!) 82 %   04/07/24 2240 (!) 141/80 97.6 °F (36.4 °C) Oral (!) 101 (!) 32 91 %   04/07/24 1900 (!) 144/81 97.3 °F (36.3 °C) Oral (!) 114 26 90 %   04/07/24 1515 -- -- -- (!) 101 (!) 33 (!) 84 %   04/07/24 1500 (!) 142/75 98.1 °F (36.7 °C) Oral (!) 102 (!) 31 --   04/07/24 1445 -- -- -- (!) 101 22 (!) 80 %   04/07/24 1430 -- -- -- (!) 101 23 --   04/07/24 1415 -- -- -- (!) 101 22 (!) 88 %           Intake/Output Summary 
that the family could not bring the meds - hence on hold for now     Anemia - normocytic   Possible iron deficiency anemia  Iron studies with ferritin 346, iron 8, percent saturation 4  Will start patient on IV iron for now  Will follow-up occult blood  Possible discharge on oral iron  Hemoglobin checked today 6.8-discussed with family about the need for transfusion and they agreed for transfusion.  Will order a unit    Abdominal pain - on rt flank   UA - normal   Ct cap - negative for intraabd patho   Will monitor for now   Patient not complaining of any more pain    Medical Decision Making:   I personally reviewed labs: CBC BMP  I personally reviewed imaging: CT of the chest and abdomen  I personally reviewed EKG:  Toxic drug monitoring: None  Discussed case with: patient , rn         Code Status: Full code  DVT Prophylaxis: Lovenox  Baseline: Unknown    Family-brother Lorne phone #[phone] is a power of      Subjective:     Chief Complaint / Reason for Physician Visit  Does not report any shortness of breath, cough or phlegm      Objective:     VITALS:   Last 24hrs VS reviewed since prior progress note. Most recent are:  Patient Vitals for the past 24 hrs:   BP Temp Temp src Pulse Resp SpO2   04/07/24 1900 (!) 144/81 -- -- (!) 114 26 90 %   04/07/24 1515 -- -- -- (!) 101 (!) 33 (!) 84 %   04/07/24 1500 (!) 142/75 98.1 °F (36.7 °C) Oral (!) 102 (!) 31 --   04/07/24 1445 -- -- -- (!) 101 22 (!) 80 %   04/07/24 1430 -- -- -- (!) 101 23 --   04/07/24 1415 -- -- -- (!) 101 22 (!) 88 %   04/07/24 1030 130/79 97.8 °F (36.6 °C) Oral 95 28 93 %   04/07/24 1001 127/80 97.4 °F (36.3 °C) Oral 96 23 --   04/07/24 0730 123/76 97.8 °F (36.6 °C) Oral 91 23 93 %   04/07/24 0425 128/79 97.6 °F (36.4 °C) Oral 96 30 --   04/07/24 0330 110/72 98 °F (36.7 °C) Axillary 73 22 96 %   04/07/24 0145 104/66 97.6 °F (36.4 °C) Oral 66 22 93 %   04/07/24 0115 (!) 94/59 -- -- 71 20 93 %   04/07/24 0100 (!) 89/61 -- -- 71 21 92 % 
      Intake/Output Summary (Last 24 hours) at 4/16/2024 0803  Last data filed at 4/15/2024 2342  Gross per 24 hour   Intake 2774.46 ml   Output 250 ml   Net 2524.46 ml        PHYSICAL EXAM:  General   chronically ill, NAD  EENT  Normocephalic, Atraumatic, PERRLA, EOMI, sclera clear  Respiratory   Clear To Auscultation bilaterally - no wheezes, rales, rhonchi, or crackles  Cardiology  Regular Rate and Rythmn  - no murmurs, rubs or gallops  Abdominal  Soft, epigastric TTP, non-distended, positive bowel sounds, no hepatosplenomegaly, no palpable mass. Incontinent of large liquid very dark stool  Extremities  No clubbing, cyanosis, or edema. Pulses intact.  Skin  Normal skin turgor.  No rashes or skin ulcers noted     Lab Data   Recent Results (from the past 12 hour(s))   Sodium    Collection Time: 04/15/24 11:15 PM   Result Value Ref Range    Sodium 131 (L) 136 - 145 mmol/L   CBC with Auto Differential    Collection Time: 04/16/24  3:19 AM   Result Value Ref Range    WBC 10.4 3.6 - 11.0 K/uL    RBC 2.39 (L) 3.80 - 5.20 M/uL    Hemoglobin 6.7 (L) 11.5 - 16.0 g/dL    Hematocrit 21.8 (L) 35.0 - 47.0 %    MCV 91.2 80.0 - 99.0 FL    MCH 28.0 26.0 - 34.0 PG    MCHC 30.7 30.0 - 36.5 g/dL    RDW 18.0 (H) 11.5 - 14.5 %    Platelets 466 (H) 150 - 400 K/uL    MPV 10.0 8.9 - 12.9 FL    Nucleated RBCs 0.0 0  WBC    nRBC 0.00 0.00 - 0.01 K/uL    Neutrophils % 66 32 - 75 %    Lymphocytes % 11 (L) 12 - 49 %    Monocytes % 20 (H) 5 - 13 %    Eosinophils % 1 0 - 7 %    Basophils % 1 0 - 1 %    Immature Granulocytes % 1 (H) 0.0 - 0.5 %    Neutrophils Absolute 6.9 1.8 - 8.0 K/UL    Lymphocytes Absolute 1.1 0.8 - 3.5 K/UL    Monocytes Absolute 2.1 (H) 0.0 - 1.0 K/UL    Eosinophils Absolute 0.1 0.0 - 0.4 K/UL    Basophils Absolute 0.1 0.0 - 0.1 K/UL    Immature Granulocytes Absolute 0.1 (H) 0.00 - 0.04 K/UL    Differential Type SMEAR SCANNED      RBC Comment ANISOCYTOSIS  1+       Sodium    Collection Time: 04/16/24  3:19 AM 
    Tobacco Use    Smoking status: Every Day    Smokeless tobacco: Not on file   Substance Use Topics    Alcohol use: Yes      No family history on file.  OBJECTIVE:     Vital Signs:     /84   Pulse 95   Temp 98.9 °F (37.2 °C) (Oral)   Resp 23   Ht 1.6 m (5' 2.99\")   Wt 62.1 kg (136 lb 14.5 oz)   SpO2 92%   BMI 24.26 kg/m²    Temp (24hrs), Av.4 °F (36.9 °C), Min:97.4 °F (36.3 °C), Max:100.1 °F (37.8 °C)     Intake/Output:     Last shift: 04/10 07 - 04/10 1900  In: 50   Out: 600 [Urine:600]    Last 3 shifts: 1901 - 04/10 0700  In: 2167.8 [I.V.:340]  Out: 4050 [Urine:4050]        Intake/Output Summary (Last 24 hours) at 4/10/2024 1041  Last data filed at 4/10/2024 1036  Gross per 24 hour   Intake 1004.24 ml   Output 2900 ml   Net -1895.76 ml         Physical Exam:                                        Exam Findings Other   General: No resp distress noted, appears stated age    HEENT:  No ulcers, JVD not elevated, no cervical LAD    Chest: No pectus deformity, normal chest rise b/l    HEART:  RRR, no murmurs/rubs/gallops    Lungs:  Few rhonchi, no crackles or wheeze    ABD: Soft/NT, non rigid mildly distended    EXT: No cyanosis/clubbing/edema, normal peripheral pulses    Skin: No rashes or ulcers, no mottling    Neuro: Awake, alert, confused        Medications:  Current Facility-Administered Medications   Medication Dose Route Frequency    vancomycin (VANCOCIN) 750 mg in sodium chloride 0.9 % 250 mL IVPB ([id number])  750 mg IntraVENous Q12H    magnesium sulfate 2000 mg in 50 mL IVPB premix  2,000 mg IntraVENous Once    potassium chloride 10 mEq/100 mL IVPB (Peripheral Line)  10 mEq IntraVENous Q1H    potassium chloride (KLOR-CON) extended release tablet 20 mEq  20 mEq Oral Daily    0.9 % sodium chloride infusion   IntraVENous PRN    furosemide (LASIX) injection 20 mg  20 mg IntraVENous BID    Vancomycin Level   Other RX Placeholder    pantoprazole (PROTONIX) tablet 40 mg  40 mg Oral BID AC    
SubCUTAneous Daily    piperacillin-tazobactam (ZOSYN) 3,375 mg in sodium chloride 0.9 % 50 mL IVPB (mini-bag)  3,375 mg IntraVENous Q8H    diazePAM (VALIUM) tablet 1 mg  1 mg Oral Nightly    divalproex (DEPAKOTE) DR tablet 250 mg  250 mg Oral BID    mirtazapine (REMERON) tablet 15 mg  15 mg Oral Nightly    atorvastatin (LIPITOR) tablet 20 mg  20 mg Oral Daily    aspirin EC tablet 81 mg  81 mg Oral Daily    sennosides-docusate sodium (SENOKOT-S) 8.6-50 MG tablet 1 tablet  1 tablet Oral Daily    [Held by provider] dextromethorphan-quiNIDine (NUEDEXTA) 20-10 MG per capsule 1 capsule  1 capsule Oral BID    sodium chloride flush 0.9 % injection 5-40 mL  5-40 mL IntraVENous 2 times per day    sodium chloride flush 0.9 % injection 5-40 mL  5-40 mL IntraVENous PRN    0.9 % sodium chloride infusion   IntraVENous PRN    ondansetron (ZOFRAN-ODT) disintegrating tablet 4 mg  4 mg Oral Q8H PRN    Or    ondansetron (ZOFRAN) injection 4 mg  4 mg IntraVENous Q6H PRN    polyethylene glycol (GLYCOLAX) packet 17 g  17 g Oral Daily PRN    acetaminophen (TYLENOL) tablet 650 mg  650 mg Oral Q6H PRN    Or    acetaminophen (TYLENOL) suppository 650 mg  650 mg Rectal Q6H PRN    Nuedexta - Please identify and contact pharmacy when patient's home supply has arrived - thank you   Other RX Placeholder    metoprolol tartrate (LOPRESSOR) tablet 12.5 mg  12.5 mg Oral BID        Fabio Marquez MD  4/15/2024                                                                                                               
chronic anemia due to acute gi bleeding   Iron deficiency anemia  Melena  No clear baseline hemoglobin available.  Presented with hemoglobin of 8. Dropped  even lower  at 5.9.  s/p 2 units of PRBCs.  S/p Venofer  Continue Protonix and Carafate  On 4/12: EGD: 2 nonbleeding small AVMs in the posterior duodenal bulb s/p APC cauterization.In the duodenal 2nd portion there was bleeding and irrigation revealed active bleeding from an AVM on the medial wall.  Lavaging revealed persistent bleeding.   placed (2) regular sized EndoClips but still some oozing and  added a larger Ultra CLIP and this in addition to further APC to the site helped stop any further bleeding  CBC q12h  Hb on 4/13: 7.2  4/15: Hemoglobin 6.7   - required 1 U 4/16 AM  Capsule study: capsule remained in the stomach for the duration of 8 hour battery life  There was ingestion of oral contents after 7 hrs in the stomach that prevented visualization in the final hour in stomach.  Plan for repeat endoscopy with capsule placement 4/17 4/18:   S/p capsule study- result pending   Hb -9.7 stable on 4/17   No labs from today   Discussed with gi - awaiting capsule study result     4/19:   Hb :8   Her AVMs are diffuse would require a deep enteroscopy which is not readily available locally but in meantime  started SQ octreotide BID to be continued at discharge and gi team can also attempt an outpatient referral to [facility] as well     4/21/2024:  Hemoglobin has been trending down-today 7.9  Will follow-up CBC in a.m.  No overnight blood loss noted    4/22/24:  Gi advised to continue octreotide - and possible long acting IM octreotide - will need heme onc follow up, however insurance may be an issue .     Macular rash in back and thigh noted likely utricaria  Continue with Benadryl prn   Status post prednisone 40 mg for 5 days.    Hyponatremia multifactorial in etiology  Hypokalemia  Resolved    Hypertension  Hyperlipidemia  Continue with Lipitor  Resumed  metoprolol 
Level   Other RX Placeholder    vancomycin (VANCOCIN) 750 mg in sodium chloride 0.9 % 250 mL IVPB ([id number])  750 mg IntraVENous Q12H    potassium chloride (KLOR-CON) extended release tablet 20 mEq  20 mEq Oral Daily    0.9 % sodium chloride infusion   IntraVENous PRN    furosemide (LASIX) injection 20 mg  20 mg IntraVENous BID    pantoprazole (PROTONIX) tablet 40 mg  40 mg Oral BID AC    0.9 % sodium chloride infusion   IntraVENous PRN    0.9 % sodium chloride infusion   IntraVENous PRN    [Held by provider] enoxaparin (LOVENOX) injection 40 mg  40 mg SubCUTAneous Daily    piperacillin-tazobactam (ZOSYN) 3,375 mg in sodium chloride 0.9 % 50 mL IVPB (mini-bag)  3,375 mg IntraVENous Q8H    diazePAM (VALIUM) tablet 1 mg  1 mg Oral Nightly    divalproex (DEPAKOTE) DR tablet 250 mg  250 mg Oral BID    mirtazapine (REMERON) tablet 15 mg  15 mg Oral Nightly    atorvastatin (LIPITOR) tablet 20 mg  20 mg Oral Daily    aspirin EC tablet 81 mg  81 mg Oral Daily    sennosides-docusate sodium (SENOKOT-S) 8.6-50 MG tablet 1 tablet  1 tablet Oral Daily    [Held by provider] dextromethorphan-quiNIDine (NUEDEXTA) 20-10 MG per capsule 1 capsule  1 capsule Oral BID    sodium chloride flush 0.9 % injection 5-40 mL  5-40 mL IntraVENous 2 times per day    sodium chloride flush 0.9 % injection 5-40 mL  5-40 mL IntraVENous PRN    0.9 % sodium chloride infusion   IntraVENous PRN    ondansetron (ZOFRAN-ODT) disintegrating tablet 4 mg  4 mg Oral Q8H PRN    Or    ondansetron (ZOFRAN) injection 4 mg  4 mg IntraVENous Q6H PRN    polyethylene glycol (GLYCOLAX) packet 17 g  17 g Oral Daily PRN    acetaminophen (TYLENOL) tablet 650 mg  650 mg Oral Q6H PRN    Or    acetaminophen (TYLENOL) suppository 650 mg  650 mg Rectal Q6H PRN    Nuedexta - Please identify and contact pharmacy when patient's home supply has arrived - thank you   Other RX Placeholder    [Held by provider] metoprolol tartrate (LOPRESSOR) tablet 12.5 mg  12.5 mg Oral BID       
edema  GI:  Soft, Non distended, Non tender.  +Bowel sounds  Neurologic:  Alert and oriented X 1,   Psych:   Good insight. Not anxious nor agitated  Skin:  No rashes.  No jaundice, Rt UE edema     Reviewed most current lab test results and cultures  YES  Reviewed most current radiology test results   YES  Review and summation of old records today    NO  Reviewed patient's current orders and MAR    YES  PMH/SH reviewed - no change compared to H&P    Procedures: see electronic medical records for all procedures/Xrays and details which were not copied into this note but were reviewed prior to creation of Plan.      LABS:  I reviewed today's most current labs and imaging studies.  Pertinent labs include:  Recent Labs     04/12/24  2155 04/13/24  0623 04/13/24  1339   WBC 11.2* 10.8 10.4   HGB 7.1* 7.4* 7.2*   HCT 22.2* 23.9* 22.4*    386 356     Recent Labs     04/12/24  0336 04/14/24  0232   NA  --  125*   K  --  3.7   CL  --  91*   CO2  --  29   GLUCOSE  --  91   BUN  --  12   CREATININE  --  0.87   CALCIUM  --  8.6   MG 1.4*  --        Signed: Zabrina Stewart MD         
results and cultures  YES  Reviewed most current radiology test results   YES  Review and summation of old records today    NO  Reviewed patient's current orders and MAR    YES  PMH/SH reviewed - no change compared to H&P    Procedures: see electronic medical records for all procedures/Xrays and details which were not copied into this note but were reviewed prior to creation of Plan.      LABS:  I reviewed today's most current labs and imaging studies.  Pertinent labs include:  Recent Labs     04/11/24  0551 04/12/24  0336 04/12/24  1243   WBC 10.7 10.4 9.9   HGB 8.4* 7.4* 7.3*   HCT 26.6* 23.3* 22.5*    333 325       Recent Labs     04/10/24  0121 04/11/24 0551 04/12/24  0336   *  --   --    K 3.2*  --   --    CL 95*  --   --    CO2 31  --   --    GLUCOSE 94  --   --    BUN 7  --   --    CREATININE 0.76  --   --    CALCIUM 8.2*  --   --    MG 1.0* 1.6 1.4*   PHOS 3.2  --   --          Signed: Tim Liu MD         
reviewed imaging:   I personally reviewed EKG:  Toxic drug monitoring: monitor bmp for radha from lasix toxicity  Discussed case with: RN CM ID        Code Status: Full code  DVT Prophylaxis: SCDs due to dark stool and anemia  Baseline: Unknown     Family-brother Lorne phone #[phone] is a power of     Subjective:     Chief Complaint / Reason for Physician Visit  \"Awake and asking for food.  Seen after biopsy.   \".  Discussed with RN events overnight.       Objective:     VITALS:   Last 24hrs VS reviewed since prior progress note. Most recent are:  Patient Vitals for the past 24 hrs:   BP Temp Temp src Pulse Resp SpO2 Weight   04/19/24 1130 124/72 98 °F (36.7 °C) Oral 78 23 92 % --   04/19/24 0813 122/73 -- -- 76 -- -- --   04/19/24 0742 122/73 97.5 °F (36.4 °C) Oral 81 23 99 % --   04/19/24 0700 -- -- -- 82 15 100 % --   04/19/24 0600 -- -- -- -- -- -- 60.6 kg (133 lb 9.6 oz)   04/19/24 0202 124/66 98 °F (36.7 °C) Oral 80 24 -- --   04/18/24 2310 120/66 98.1 °F (36.7 °C) Oral 82 28 -- --   04/18/24 1915 101/69 98.2 °F (36.8 °C) Oral 82 30 -- --   04/18/24 1600 108/66 98.9 °F (37.2 °C) Axillary 83 26 96 % --         Intake/Output Summary (Last 24 hours) at 4/19/2024 1327  Last data filed at 4/19/2024 0800  Gross per 24 hour   Intake 240 ml   Output 650 ml   Net -410 ml        I had a face to face encounter and independently examined this patient on 4/19/2024, as outlined below:  PHYSICAL EXAM:  General: Alert, cooperative  EENT:  EOMI. Anicteric sclerae.  Resp:  Coarse breath sounds   CV:  Regular  rhythm,  RUE edema  GI:  Soft, Non distended, Non tender.  +Bowel sounds  Neurologic:  Alert and oriented X 1  Skin:  No rashes.  No jaundice    Reviewed most current lab test results and cultures  YES  Reviewed most current radiology test results   YES  Review and summation of old records today    NO  Reviewed patient's current orders and MAR    YES  PMH/SH reviewed - no change compared to H&P    Procedures: 
shifts: 04/15 1901 - 04/17 0700  In: 2748.7 [P.O.:880; I.V.:1215]  Out: 2701 [Urine:2700]        Intake/Output Summary (Last 24 hours) at 4/17/2024 0830  Last data filed at 4/17/2024 0609  Gross per 24 hour   Intake 2421.77 ml   Output 2701 ml   Net -279.23 ml         Physical Exam:                                        Exam Findings Other   General: No resp distress noted, appears stated age    HEENT:  No ulcers, JVD not elevated, no cervical LAD    Chest: No pectus deformity, normal chest rise b/l    HEART:  RRR, no murmurs/rubs/gallops    Lungs:  Few rhonchi, no crackles or wheeze    ABD: Soft/NT, non rigid mildly distended    EXT: No cyanosis/clubbing/edema, normal peripheral pulses    Skin: No rashes or ulcers, no mottling    Neuro: Awake, alert, confused        Medications:  Current Facility-Administered Medications   Medication Dose Route Frequency    Vancomycin Level   Other RX Placeholder    0.9 % sodium chloride infusion   IntraVENous PRN    0.9 % sodium chloride infusion   IntraVENous PRN    sucralfate (CARAFATE) tablet 1 g  1 g Oral 4x Daily AC & HS    magic (miracle) mouthwash  5 mL Swish & Spit 4x Daily PRN    Vancomycin Level   Other RX Placeholder    potassium chloride (KLOR-CON) extended release tablet 20 mEq  20 mEq Oral Daily    0.9 % sodium chloride infusion   IntraVENous PRN    furosemide (LASIX) injection 20 mg  20 mg IntraVENous BID    pantoprazole (PROTONIX) tablet 40 mg  40 mg Oral BID AC    0.9 % sodium chloride infusion   IntraVENous PRN    [Held by provider] enoxaparin (LOVENOX) injection 40 mg  40 mg SubCUTAneous Daily    diazePAM (VALIUM) tablet 1 mg  1 mg Oral Nightly    divalproex (DEPAKOTE) DR tablet 250 mg  250 mg Oral BID    mirtazapine (REMERON) tablet 15 mg  15 mg Oral Nightly    atorvastatin (LIPITOR) tablet 20 mg  20 mg Oral Daily    [Held by provider] aspirin EC tablet 81 mg  81 mg Oral Daily    sennosides-docusate sodium (SENOKOT-S) 8.6-50 MG tablet 1 tablet  1 tablet Oral 
(L) 35.0 - 47.0 %    MCV 91.3 80.0 - 99.0 FL    MCH 29.0 26.0 - 34.0 PG    MCHC 31.7 30.0 - 36.5 g/dL    RDW 17.3 (H) 11.5 - 14.5 %    Platelets 539 (H) 150 - 400 K/uL    MPV 10.3 8.9 - 12.9 FL    Nucleated RBCs 0.0 0  WBC    nRBC 0.00 0.00 - 0.01 K/uL       Imaging No GI imaging today      Medications Reviewed    PMH/ reviewed - no change compared to H&P  Date/Time:  4/19/2024  
-- -- 1.6 m (5' 2.99\")           Intake/Output Summary (Last 24 hours) at 5/3/2024 1207  Last data filed at 5/3/2024 1039  Gross per 24 hour   Intake 390 ml   Output --   Net 390 ml          I had a face to face encounter and independently examined this patient on 5/3/2024, as outlined below:  PHYSICAL EXAM:  General: Alert, cooperative  EENT:  EOMI. Anicteric sclerae.  Resp:  CTA bilaterally, no wheezing or rales.  No accessory muscle use  CV:  Regular  rhythm,  No edema  GI:  Soft, Non distended, Non tender.  +Bowel sounds  Neurologic: Confused, normal speech,   Psych: Poor insight insight. Not anxious nor agitated  Skin:  No rashes.  No jaundice    Reviewed most current lab test results and cultures  YES  Reviewed most current radiology test results   YES  Review and summation of old records today    NO  Reviewed patient's current orders and MAR    YES  PMH/SH reviewed - no change compared to H&P    Procedures: see electronic medical records for all procedures/Xrays and details which were not copied into this note but were reviewed prior to creation of Plan.      LABS:  I reviewed today's most current labs and imaging studies.  Pertinent labs include:  Recent Labs     05/01/24  1607 05/02/24  0150 05/02/24  0946 05/03/24  0523   WBC 18.6* 16.1*  --  15.2*   HGB 8.2* 6.5* 7.0* 5.8*   HCT 26.0* 21.3* 22.4* 19.3*   * 364  --  382       Recent Labs     05/01/24  0935 05/02/24  0150 05/03/24  0523   * 138 141   K 3.4* 3.1* 3.3*   CL 96* 99 105   CO2 31 32 31   GLUCOSE 98 100 104*   BUN 39* 44* 38*   CREATININE 1.20* 1.42* 1.22*   CALCIUM 9.4 9.0 8.4*   MG  --   --  1.7         Signed: Ailyn Mcpherson MD         
Placeholder    metoprolol tartrate (LOPRESSOR) tablet 12.5 mg  12.5 mg Oral BID       Labs:  ABG Invalid input(s): \"PHI\", \"PCO2I\", \"PO2I\", \"HCO3I\", \"SO2I\", \"FIO2I\"     CBC Recent Labs     04/16/24  0319 04/16/24  1519 04/17/24  0430   WBC 10.4  --  11.0   HGB 6.7* 9.4* 9.7*   HCT 21.8* 29.3* 30.8*   *  --  531*   MCV 91.2  --  92.2   MCH 28.0  --  29.0          Metabolic  Panel Recent Labs     04/17/24  0430 04/17/24  1247 04/17/24 2010 04/18/24  0404   * 133* 133* 133*   K 3.7  --   --  3.7   CL 98  --   --  99   CO2 27  --   --  30   BUN 13  --   --  15   MG 1.8  --   --  1.7   PHOS 2.6  --   --  4.8*          Pertinent Labs                Corwin Garrido MD  4/18/2024  
prior to creation of Plan.      LABS:  I reviewed today's most current labs and imaging studies.  Pertinent labs include:  Recent Labs     05/04/24 0318 05/04/24 1801 05/05/24 0038 05/05/24  0650   WBC 15.5*  --  15.8* 13.9*   HGB 6.7* 8.3* 8.8* 7.7*   HCT 21.7* 26.4* 28.2* 25.4*     --  372 321       Recent Labs     05/03/24 2053 05/04/24 0318 05/05/24 0038 05/05/24  0650    144 143  --    K 2.9* 3.6 3.1*  --     110* 111*  --    CO2 29 28 26  --    GLUCOSE 146* 137* 159*  --    BUN 27* 23* 12  --    CREATININE 1.01 0.87 0.84  --    CALCIUM 8.9 8.1* 8.9  --    MG 1.7  --  1.4* 1.7   PHOS 2.2*  --   --   --          Signed: Ailyn Mcpherson MD         
TECHNIQUE: 0425 hours portable chest AP view FINDINGS: Heart size is normal. There is persistent opacity in the distribution of the right middle lobe consistent with atelectasis and or consolidation. This has increased in density in the volume of the lobe has decreased. There is a small right pleural effusion. This is in the subpulmonic location. Left lung is clear. There is enlargement of the right hilum and right paratracheal region suggesting adenopathy. Right IJ catheter has been removed.     1. Persistent opacity in the distribution of right middle lobe. The overall density of the abnormality has increased in the volume of the middle lobe has decreased and this is consistent with worsening atelectasis possibly superimposed upon consolidation. 2. Small subpulmonic right pleural effusion has increased. 3. Probable right hilar and right paratracheal adenopathy.    XR CHEST PORTABLE    Result Date: 4/14/2024  EXAM:  XR CHEST PORTABLE INDICATION: confirm central line still in correct position COMPARISON: 4/12/2024 TECHNIQUE: portable chest AP view FINDINGS: Right internal jugular central venous catheter is stable in position, with its tip overlying the mid SVC. The thoracic aorta remains tortuous and atherosclerotic. The cardiac silhouette is within normal limits. The pulmonary vasculature is within normal limits. Right middle lobe airspace disease is increased. The visualized bones and upper abdomen are age-appropriate.     Increased right middle lobe airspace disease, compatible with pneumonia. Right internal jugular central venous catheter is stable in position.     US THORACENTESIS Which side should the procedure be performed? Right    Result Date: 4/12/2024  PROCEDURE: Ultrasound-guided Thoracentesis INDICATION: Pleural effusion, dyspnea OPERATING PHYSICIAN: Nicolas Christianson M.D. ESTIMATED BLOOD LOSS: Less than 5cc SPECIMENS REMOVED: 300 cc of pleural fluid COMPLICATIONS: None immediate. Procedure and findings: 
fluid COMPLICATIONS: None immediate. Procedure and findings: The risks and benefits of the procedure were discussed with the patient. Written consent was obtained. Preliminary ultrasound imaging of the right chest demonstrated a large pleural effusion. An appropriate site for thoracentesis was marked on the skin. The patient was prepped and draped in sterile fashion. 1% lidocaine was injected locally. A dermatotomy was made. A thoracentesis catheter was then inserted into the pleural space using trocar technique. Approximately 3 ml of yellow fluid was obtained. The catheter was then removed. The patient tolerated the procedure well. There were no immediate complications.     Successful ultrasound guided right thoracentesis yielding approximately 300 ml of fluid.     XR CHEST PORTABLE    Result Date: 4/12/2024  EXAM: XR CHEST PORTABLE DATE: 4/12/2024 3:10 PM INDICATION: s/p thora COMPARISON: Chest radiograph April 11, 2024. CT chest April 3, 2024. FINDINGS: AP portable chest radiograph. There is a right IJ catheter with the tip in the mid SVC. There is decreased right-sided effusion status post thoracentesis. No pneumothorax is seen. Masslike consolidation is again seen in the right lower lobe. The left lung is clear. The heart size is normal.     1. Decreased effusion and no pneumothorax status post right thoracentesis. 2. Again seen is a masslike consolidation of the right lower lobe.     XR CHEST PORTABLE    Result Date: 4/11/2024  EXAM: XR CHEST PORTABLE DATE: 4/11/2024 8:05 PM INDICATION: Post Central line placement COMPARISON: Chest radiograph April 11, 2024 at 0532 hours FINDINGS: AP portable chest radiograph. There is a new right IJ central venous catheter with the tip in satisfactory position near the cavoatrial junction. The cardiomediastinal configuration is unchanged. There is ongoing moderate volume right-sided pleural effusion. The left lung is clear. There is no pneumothorax.     The right IJ catheter 
Collected: 04/03/24 0026    Order Status: Completed Specimen: Nasopharyngeal Updated: 04/03/24 0056     Source Nasopharyngeal        SARS-CoV-2, Rapid Not detected        Comment: Rapid Abbott ID Now     Rapid NAAT:  The specimen is NEGATIVE for SARS-CoV-2, the novel coronavirus associated with COVID-19.     Negative results should be treated as presumptive and, if inconsistent with clinical signs and symptoms or necessary for patient management, should be tested with an alternative molecular assay.  Negative results do not preclude SARS-CoV-2 infection and should not be used as the sole basis for patient management decisions.     This test has been authorized by the FDA under an Emergency Use Authorization (EUA) for use by authorized laboratories.   Fact sheet for Healthcare Providers:  https://www.fda.gov/media/477930/download  Fact sheet for Patients: https://www.fda.gov/media/380738/download     Methodology: Isothermal Nucleic Acid Amplification         Rapid influenza A/B antigens [[id number]] Collected: 04/03/24 0026    Order Status: Completed Specimen: Nasopharyngeal Updated: 04/03/24 0056     Influenza A Ag Negative        Influenza B Ag Negative              Objective:    Blood pressure 137/78, pulse 97, temperature 99.8 °F (37.7 °C), temperature source Oral, resp. rate 22, height 1.6 m (5' 2.99\"), weight 63.6 kg (140 lb 3.4 oz), SpO2 93 %.    Physical Exam:   /78   Pulse 97   Temp 99.8 °F (37.7 °C) (Oral)   Resp 22   Ht 1.6 m (5' 2.99\")   Wt 63.6 kg (140 lb 3.4 oz)   SpO2 93%   BMI 24.84 kg/m²     /78   Pulse 97   Temp 99.8 °F (37.7 °C) (Oral)   Resp 22   Ht 1.6 m (5' 2.99\")   Wt 63.6 kg (140 lb 3.4 oz)   SpO2 93%   BMI 24.84 kg/m²     PHYSICAL EXAM:  General: WD, WN. Alert, uncooperative, no acute distress    EENT:  EOMI. Anicteric sclerae. MMM  Resp:  Clear in apex with decreased breath sounds at bases, no wheezing or rales.  No accessory muscle use  CV:  Regular  rhythm,  No

## 2024-05-07 NOTE — DISCHARGE SUMMARY
Discharge Summary    Name: Lillie Claire  [id number]  YOB: 1955 (Age: 68 y.o.)   Date of Admission: 4/2/2024  Date of Discharge: 4/27/2024  Attending Physician: Ester Mike MD    Discharge Diagnosis:   Severe sepsis most likely pneumonia  Acute septic encephalopathy- in the setting of infection   Right lobe lung mass  Acute pulmonary edema  Biopsy result shows: metastatic undifferentiated carcinoma  Macular rash in back and thigh noted likely utricaria  Hyponatremia multifactorial in etiology  Hypokalemia  Hypertension  Hyperlipidemia  Bipolar disease  Right flank pain  Mildly dilated aortic root. Ao root diameter is 4.1 cm         Consultations:  IP CONSULT TO PHARMACY  IP CONSULT TO PALLIATIVE CARE  IP CONSULT TO PHARMACY  IP CONSULT TO NEPHROLOGY  IP CONSULT TO PULMONOLOGY  IP CONSULT TO GI  IP CONSULT TO PHARMACY  IP CONSULT TO INFECTIOUS DISEASES  IP CONSULT TO NEPHROLOGY  IP CONSULT TO PALLIATIVE CARE  IP CONSULT TO ONCOLOGY  IP CONSULT TO CASE MANAGEMENT      Brief Admission History/Reason for Admission Per Sheldon Rollins MD:       Brief Hospital Course by Main Problems:   Severe sepsis most likely pneumonia  Acute septic encephalopathy- in the setting of infection   Right lobe lung mass  Acute pulmonary edema  Biopsy result shows: metastatic undifferentiated carcinoma     CT of the chest showed  IMPRESSION:  1. 5 cm x 4.9 cm masslike partial consolidation of the superior segment of the right lower lobe.   2. Right paratracheal, subcarinal and right hilar lymphadenopathy.  3. Bronchoscopy and/or CT PET scan can be performed for further evaluation, as indicated.     -Blood cultures negative.  Respiratory viral panel negative including COVID-19  -Continue Zosyn.  And added vancomycin due to fever of 101 and also continued hypoxia  -Evaluated by pulmonary and recommended repeating a CT scan in about 4 weeks for further evaluation of right lung 
                                  Discharge Summary    Name: Lillie Claire  [id number]  YOB: 1955 (Age: 68 y.o.)   Date of Admission: 4/2/2024  Date of Discharge: 4/29/2024  Attending Physician: Alisha Lopez MD    Discharge Diagnosis:   Severe sepsis most likely pneumonia  Acute septic encephalopathy- in the setting of infection   Right lobe lung mass  Acute pulmonary edema  Biopsy result shows: metastatic undifferentiated carcinoma  Macular rash in back and thigh noted likely utricaria  Hyponatremia multifactorial in etiology  Hypokalemia  Hypertension  Hyperlipidemia  Bipolar disease  Right flank pain  Mildly dilated aortic root. Ao root diameter is 4.1 cm         Consultations:  IP CONSULT TO PHARMACY  IP CONSULT TO PALLIATIVE CARE  IP CONSULT TO PHARMACY  IP CONSULT TO NEPHROLOGY  IP CONSULT TO PULMONOLOGY  IP CONSULT TO GI  IP CONSULT TO PHARMACY  IP CONSULT TO INFECTIOUS DISEASES  IP CONSULT TO NEPHROLOGY  IP CONSULT TO PALLIATIVE CARE  IP CONSULT TO ONCOLOGY  IP CONSULT TO CASE MANAGEMENT      Brief Admission History/Reason for Admission Per Sheldon Rollins MD:       Brief Hospital Course by Main Problems:   Severe sepsis most likely pneumonia  Acute septic encephalopathy- in the setting of infection   Right lobe lung mass  Acute pulmonary edema  Biopsy result shows: metastatic undifferentiated carcinoma     CT of the chest showed  IMPRESSION:  1. 5 cm x 4.9 cm masslike partial consolidation of the superior segment of the right lower lobe.   2. Right paratracheal, subcarinal and right hilar lymphadenopathy.  3. Bronchoscopy and/or CT PET scan can be performed for further evaluation, as indicated.     -Blood cultures negative.  Respiratory viral panel negative including COVID-19  -Continue Zosyn.  And added vancomycin due to fever of 101 and also continued hypoxia  -Evaluated by pulmonary and recommended repeating a CT scan in about 4 weeks for further evaluation of right lung 
                                  Discharge Summary    Name: Lillie Claire  [id number]  YOB: 1955 (Age: 68 y.o.)   Date of Admission: 4/2/2024  Date of Discharge: 4/25/2024  Attending Physician: Nydia Saldana MD    Discharge Diagnosis:     Consultations:  IP CONSULT TO PHARMACY  IP CONSULT TO PALLIATIVE CARE  IP CONSULT TO PHARMACY  IP CONSULT TO NEPHROLOGY  IP CONSULT TO PULMONOLOGY  IP CONSULT TO GI  IP CONSULT TO PHARMACY  IP CONSULT TO INFECTIOUS DISEASES  IP CONSULT TO NEPHROLOGY  IP CONSULT TO PALLIATIVE CARE  IP CONSULT TO ANESTHESIOLOGY  IP CONSULT TO ONCOLOGY  IP CONSULT TO CASE MANAGEMENT      Brief Admission History/Reason for Admission Per Sheldon Rollins MD:   harsha Claire is a 68 y.o.  female with PMHx significant for bipolar , cva , htn who presented to ED for altered mental status.  Most of the HPI obtained from ED notes as no family at bedside and unable to reach next of kin.  Reportedly patient has been confused for a week, has been falling and has tremors.  Upon presentation patient was febrile, tachypneic, tachycardic  His lab work showed normal CBC, severe hyponatremia, mildly elevated creatinine and elevated lactic acid with lactic acid of 3.29 which resolved with IV fluid  CT chest abdomen pelvis showed  IMPRESSION:  1. 5 cm x 4.9 cm masslike partial consolidation of the superior segment of the  right lower lobe. 2. Right paratracheal, subcarinal and right hilar  lymphadenopathy.  3. Bronchoscopy and/or CT PET scan can be performed for further evaluation, as  indicated.  When seen patient was confused  We were asked to admit for work up and evaluation of the above problems.        Brief Hospital Course by Main Problems:   Severe sepsis most likely pneumonia  Acute septic encephalopathy- in the setting of infection   Right lobe lung mass  Acute pulmonary edema  Biopsy result shows: metastatic undifferentiated carcinoma     CT of the chest showed  IMPRESSION:  1. 5 cm x 4.9 cm 
bleeding.   placed (2) regular sized EndoClips but still some oozing and  added a larger Ultra CLIP and this in addition to further APC to the site helped stop any further bleeding  CBC q12h  Hb on 4/13: 7.2  4/15: Hemoglobin 6.7   - required 1 U 4/16 AM  Capsule study: capsule remained in the stomach for the duration of 8 hour battery life  There was ingestion of oral contents after 7 hrs in the stomach that prevented visualization in the final hour in stomach.  Plan for repeat endoscopy with capsule placement 4/17 4/18: S/p capsule study- result pending   Hb -9.7 stable on 4/17   No labs from today   Discussed with gi - awaiting capsule study result      4/19: Hb :8   Her AVMs are diffuse would require a deep enteroscopy which is not readily available locally but in meantime  started SQ octreotide BID to be continued at discharge and gi team can also attempt an outpatient referral to [facility] as well      4/21/2024: Hemoglobin has been trending down-today 7.9  Will follow-up CBC in a.m.  No overnight blood loss noted     4/22/24: Gi advised to continue octreotide - and possible long acting IM octreotide - will need heme onc follow up, however insurance may be an issue .     4/24: Planning to transition to hosp[facility].  Will hold off further labs     Macular rash in back and thigh noted likely utricaria  Continue with Benadryl prn   Status post prednisone 40 mg for 5 days.     Hyponatremia multifactorial in etiology  Hypokalemia  Resolved     Hypertension  Hyperlipidemia  Continue with Lipitor  Resumed  metoprolol due to tachycardia with holding parameters      Bipolar disease  Continue with Depakote, Remeron,  Valium  [facility]- pharmacy mentioned that the family could not bring the meds - hence on hold for now      Right flank pain  UA - normal   Ct abdomen shows evidence of pneumonia with right sided masslike lesion  CT does not show any evidence of renal stone  Currently pain is improved   
medial wall.  Lavaging revealed persistent bleeding.   placed (2) regular sized EndoClips but still some oozing and  added a larger Ultra CLIP and this in addition to further APC to the site helped stop any further bleeding  CBC q12h  Hb on 4/13: 7.2  4/15: Hemoglobin 6.7   - required 1 U 4/16 AM  Capsule study: capsule remained in the stomach for the duration of 8 hour battery life  There was ingestion of oral contents after 7 hrs in the stomach that prevented visualization in the final hour in stomach.  Plan for repeat endoscopy with capsule placement 4/17 4/18:   S/p capsule study- result pending   Hb -9.7 stable on 4/17   No labs from today   Discussed with gi - awaiting capsule study result      4/19:   Hb :8   Her AVMs are diffuse would require a deep enteroscopy which is not readily available locally but in meantime  started SQ octreotide BID to be continued at discharge and gi team can also attempt an outpatient referral to [facility] as well      4/21/2024:  Hemoglobin has been trending down-today 7.9  Will follow-up CBC in a.m.  No overnight blood loss noted     4/22/24:  Gi advised to continue octreotide - and possible long acting IM octreotide - will need heme onc follow up, however insurance may be an issue .    4/24  Planning to transition to hospice center [facility].  Will hold off further labs         Macular rash in back and thigh noted likely utricaria  Continue with Benadryl prn   Status post prednisone 40 mg for 5 days.     Hyponatremia multifactorial in etiology  Hypokalemia  Resolved     Hypertension  Hyperlipidemia  Continue with Lipitor  Resumed  metoprolol due to tachycardia with holding parameters      Bipolar disease  Continue with Depakote, Remeron,  Valium  [facility]- pharmacy mentioned that the family could not bring the meds - hence on hold for now      Right flank pain  UA - normal   Ct abdomen shows evidence of pneumonia with right sided masslike lesion  CT does not show any evidence of 
(includes going over instructions, follow-up, prescriptions, and preparing report for sign off to her PCP) :  35 minutes   
medications  pantoprazole 40 MG tablet  sucralfate 1 GM tablet             DISPOSITION:    Home with Family:       Home with HH/PT/OT/RN:    SNF/LTC:    PK: x   OTHER:            Code status: DNR  Recommended diet:  Easy to chew diet  Recommended activity: activity as tolerated  Wound care: See surgical/procedure care instructions      Follow up with:   PCP : Husam Foster APRN - NP Damiano, Jordan F, APRN - NP  [address] [facility] [address] [phone]    Schedule an appointment as soon as possible for a visit in 3 day(s)            Total time in minutes spent coordinating this discharge (includes going over instructions, follow-up, prescriptions, and preparing report for sign off to her PCP) :  28 minutes    
not yet available    Ask your nurse or doctor about these medications  pantoprazole 40 MG tablet  sucralfate 1 GM tablet             DISPOSITION:    Home with Family:       Home with HH/PT/OT/RN:    SNF/LTC:    PK: x   OTHER:            Code status: DNR  Recommended diet:  Easy to chew diet  Recommended activity: activity as tolerated  Wound care: See surgical/procedure care instructions      Follow up with:   PCP : Husam Foster APRN - NP Damiano, Jordan F, APRN - NP  [address] [place] [id number] [phone]    Schedule an appointment as soon as possible for a visit in 3 day(s)            Total time in minutes spent coordinating this discharge (includes going over instructions, follow-up, prescriptions, and preparing report for sign off to her PCP) :  27 minutes

## 2024-05-07 NOTE — CARE COORDINATION
Transition of Care Plan:     RUR: 12%  Prior Level of Functioning: Dependent   Disposition: SNF than LTC  If SNF or IPR: Date FOC offered: 4/23/24  Date FOC received: 4/23/24  Accepting facility: [facility]  Date authorization started with reference number:   Date authorization received and expires:   Follow up appointments: Defer to facility   DME needed: Defer to facility   Transportation at discharge: BLS   IM/IMM Medicare/ letter given: 4/30/24-Email to Mr. Claire at [email or web address]  Is patient a [place] and connected with VA? No              If yes, was [place] transfer form completed and VA notified? No  Caregiver Contact: Pt's family   Discharge Caregiver contacted prior to discharge? Pt's family   Care Conference needed? No  Barriers to discharge: Insurance          CM spoke to pt's brother Mr. Claire regarding option for placement since pt is no longer under PACE program. Pt's bother was [facility]able [facility]    [facility] has accept pt and they have a bed for pt to be d/c tomorrow. Pt will be going to room 64 A.    NIK completed the [facility].     CM has arrange for transportation with [employer] for 10 am on Wed. May 1, 2024.    CM will continue to follow and assist with d/c planning.    Tara Souza      
1220 pm: CM spoke to Pratik, liaison [facility].     CM sent referral to [facility].      1150 am: Once pt is medically stable for d/c the plan is for pt to [facility] under hospice services.       852 am: Hospitalist spoke to CM regarding that pt is not medically stable for d/c today.    CM cancel transportation with [employer] and updated pt's brother and [facility].    CM will continue to follow and assist with d/c planning.            Initial Note: Pt is clear from CM standpoint for d/c.    [employer] to transport at 10 am.    Report number to [facility] is [phone].    Going to room 64 A.    Transition of Care Plan:     RUR: 12%  Prior Level of Functioning: Dependent   Disposition: SNF than LTC  If SNF or IPR: Date FOC offered: 4/23/24  Date FOC received: 4/23/24  Accepting facility: [facility]  Date authorization started with reference number:   Date authorization received and expires:   Follow up appointments: Defer to facility   DME needed: Defer to facility   Transportation at discharge: BLS   IM/IMM Medicare/ letter given: 4/30/24-Email to Mr. Claire at [email or web address]  Is patient a  and connected with VA? No              If yes, was  transfer form completed and VA notified? No  Caregiver Contact: Pt's family   Discharge Caregiver contacted prior to discharge? Pt's family was contacted   Care Conference needed? No  Barriers to discharge: None        05/01/24 0754   Services At/After Discharge   Transition of Care Consult (CM Consult) SNF   Services At/After Discharge Skilled Nursing Facility (SNF)   [place] Resource Information Provided? No   Mode of Transport at Discharge BLS   Confirm Follow Up Transport Family   Condition of Participation: Discharge Planning   The Plan for Transition of Care is related to the following treatment goals: Goal is for 
CM acknowledged discharge order; chart review completed. Unit CM has been working with pt's PACE  Tata Dominguezunt [phone] on pt's discharge plan. Current plan is long term care placement at [facility], Tata working on contract between [facility] and [address] [address], Tata will also coordinate transport once contract is in place. Unit CM has also been in touch with [facility] to ensure all parties are aware of pt and plan. PACE contact not available on weekends, unit CM will follow up during traditional business hours. Backup discharge plan of home instead of LTC at [facility] is also contingent upon PACE's assistance obtaining caregiver and other support services in the home to ensure safe discharge and reduce readmission risk.    Rosaura Briceno, [facility]  Care Management  [phone]  
Care Management Initial Assessment       RUR: 13% (Moderate RUR)  Readmission? No  1st IM letter given? Yes - 04/03/2024  1st  letter given: No     04/04/24 0832   Service Assessment   Patient Orientation Other (see comment)  (confusion)   Cognition Other (see comment)  (confusion)   History Provided By Child/Family  (Orin Claire  [phone])   Primary Caregiver Family   Accompanied By/Relationship no one   Support Systems Family Members  (Orin Claire [phone])   Patient's Healthcare Decision Maker is: Legal Next of Kin   PCP Verified by CM Yes   Last Visit to PCP Within last 3 months   Prior Functional Level Assistance with the following:;Shopping;Housework;Cooking;Mobility   Current Functional Level Assistance with the following:;Housework;Shopping;Mobility;Cooking   Can patient return to prior living arrangement Yes   Ability to make needs known: Fair   Family able to assist with home care needs: Yes   Would you like for me to discuss the discharge plan with any other family members/significant others, and if so, who? Yes  (Orin Claire [phone])   Financial Resources Medicare;Medicaid   Community Resources Other (Comment)  (pace program)   Social/Functional History   Lives With Family  (Orin Claire [phone] and her brother Zoltan Sr)   Type of Home House   Home Equipment None   Active  No   Patient's  Info Medicaid transporation   Discharge Planning   Type of Residence House   Living Arrangements Family Members  (Orin Claire [phone] and Zoltan)   Current Services Prior To Admission Other (Comment)  (Pace program)   Patient expects to be discharged to: House   Condition of Participation: Discharge Planning   The Plan for Transition of Care is related to the following treatment goals: Home health/IPR/SNF  Goal: The patient/caregiver will verbalizes/displays a method to prevent falls    The patient/caregiver will verbalizes/displays a method to prevent infection and 
Clear from CM standpoint        Transition of Care Plan:    RUR: 13% (Moderate RUR)  Prior Level of Functioning: Dependent with ADLS/IADLS  Disposition: Home with PACE and family support  Follow up appointments: [phone] Tata with PACE program will arrange  DME needed: none  Transportation at discharge: The patient needs a stretcher  IM/[facility] Medicare/ letter given: 2nd IM 04/05/2024  Is patient a [place] and connected with VA? na   If yes, was [place] transfer form completed and VA notified? na  Caregiver Contact: The patient's sister in law, Orin Claire  [phone] and brother Lorne Claire [phone]   Discharge Caregiver contacted prior to discharge? Will be contacted  Care Conference needed? no  Barriers to discharge: Medical clearance      The  (CM) reached out to the patient's brother, Lorne Claire, at [phone], informing him of the anticipated weekend discharge and the coordination of services by the PACE program upon discharge. CM has also discussed the patient's plan of care and discharge with Tata from the PACE program at [phone].  () letter has been provided to the patient's brother, Lorne Claire, at [email or web address]. No further action or concerns have been identified.         Plan A: Home with home health  Plan B: Home with follow up        Prasanth Wilkins RN  Case Management  [phone]    
Clear from CM standpoint        Transition of Care Plan:    RUR: 13% (Moderate RUR)  Prior Level of Functioning: Dependent with ADLS/IADLS  Disposition: Home with PACE and family support  Follow up appointments: [phone] Tata with PACE program will arrange  DME needed: none  Transportation at discharge: The patient needs a stretcher  IM/[facility] Medicare/ letter given: 2nd  04/05/2024  Is patient a [place] and connected with VA? na   If yes, was [place] transfer form completed and VA notified? na  Caregiver Contact: The patient's sister in law, Orin Claire  [phone] and brother Lorne Claire [phone]   Discharge Caregiver contacted prior to discharge? Will be contacted  Care Conference needed? no  Barriers to discharge: Medical clearance    CM contacted  Tata from the PACE program at [phone] to inform her of home health needs for the patient once discharged home. However, no answer. CM will discuss needs prior to discharge to ensure needs are being addressed.   The  (CM) will reached out to the patient's brother, Lorne Claire, at [phone]  and Tata from the PACE program at [phone] once the patient is ready for discharge.  () letter will be provided to the patient's brother , Lorne Claire, at [email or web address] once again close to discharge. . No further action or concerns have been identified.         Plan A: Home with home health  Plan B: Home with follow up        Prasanth Wilkins RN  Case Management  [phone]    
Transition of Care Plan to SNF/Rehab    Communication to Patient/Family:  Met with patient and family and they are agreeable to the transition plan. The Plan for Transition of Care is related to the following treatment goals: LTC with Hospice- Spoke with Maria Teresa with [facility] and they are in agreement with 3 pm  - nursing made aware to give meds for agitation only if needed   prior to discharge and Lizeth will get stat order for Valium at facility which can take 4 hours to deliver.  Per nursing patient only needs for bedtime.    The Patient and/or patient representative was provided with a choice of provider and agrees  with the discharge plan.      Yes [x] No []    A Freedom of choice list was provided with basic dialogue that supports the patient's individualized plan of care/goals and shares the quality data associated with the providers.       Yes [x] No []    SNF/Rehab Transition:  Patient has been accepted to  [facility]  and meets criteria for admission.   Patient will transported by [employer]  and expected to leave at 3 pm.    Communication to SNF/Rehab:  Bedside RN, Edna, has been notified to update the transition plan to the facility and call report (phone number)[phone]  Discharge information has been updated on the AVS. And communicated to facility via [employer], or CC link.     Chart opened via cc link    Nursing Please include all hard scripts for controlled substances, med rec and dc summary, and AVS in packet.     Reviewed and confirmed with facility, Pratik liaison with [facility] can manage the patient care needs for the following:     [place] with (X) only those applicable:  Medication:  [x]Medications are available at the facility  []IV Antibiotics    [x]Controlled Substance - hospice to obtain.  []Weekly Labs    Equipment:  []CPAP/BiPAP  []Wound Vacuum  []Julien or Urinary Device  []PICC/Central Line  []Nebulizer  []Ventilator    Treatment:  []Isolation (for MRSA, VRE, 
Transition of Care Plan:     RUR: 18%  Prior Level of Functioning: Dependent   Disposition: [facility]   If SNF or IPR: Date FOC offered: 4/23/24  Date FOC received: 4/23/24  Transportation at discharge: BLS   IM/IMM Medicare/ letter given: 4/30/24-Email to Mr. Claire at [email or web address]  Caregiver Contact: brother Lorne Claire [phone]  Discharge Caregiver contacted prior to discharge? To be contacted  Care Conference needed? Not at this time   Barriers to discharge: medical stability     Update  Patient has not been able to get blood transfusion yet. MD informed via perfect serve. MD requesting to push d/c back to Sunday. NIK reached out to hospice liaison Maria Teresa [phone] who reported he would not have staff for Sunday but would Monday. NIK has notified MD via V[employer]cher[employer] of need for d/c Monday instead of Sunday.       Initial note  CM completed chart review and spoke with MD. Goal for d/c tomorrow if HGB stabilizes.     NIK spoke with [facility] repMaria Teresa to inform him of possible d/c tomorrow. Per Maria Teresa he will call CM back shortly to let CM know what time they could admit tomorrow.       Nini Avila, BSW, CM  [phone]          
Transition of Care Plan:    RUR: 12%  Prior Level of Functioning: Dependent   Disposition: SNF than LTC  If SNF or IPR: Date FOC offered: 4/23/24  Date FOC received: 4/23/24  Accepting facility: [facility]   Date authorization started with reference number:   Date authorization received and expires:   Follow up appointments: Defer to facility   DME needed: Defer to facility   Transportation at discharge: BLS   IM/IMM Medicare/ letter given: Needs to be given   Is patient a  and connected with VA? No   If yes, was  transfer form completed and VA notified? No  Caregiver Contact: Pt's family   Discharge Caregiver contacted prior to discharge? Pt's family   Care Conference needed? No  Barriers to discharge: Insurance       CM reviewed pt's chart and is aware that pt is medically stable for d/c but CM was informed that as of May 1, 2024 pt will not longer been under PACE program and that is reason that its taking a while to get the contract.     As of May 1, 2024 pt will have Medicare.    CM has reached out to [facility] to update them.    CM will follow and assist with d/c planning.    Tara Souza    [id number]     
Transition of Care Plan:    RUR: 13% (Moderate RUR)  Prior Level of Functioning: Dependent with ADLS/IADLS   Disposition: Home with PACE program assistance and family support  Follow up appointments: [phone] Tata with PACE program will arrange   DME needed: Per PACE program  Transportation at discharge: PACE program will arrange transportation   IM/IMM Medicare/ letter given: 2nd IM needed upon discharge   Is patient a  and connected with VA? na   If yes, was [place] transfer form completed and VA notified? na  Caregiver Contact: The patient's sister in law, Orin Claire  [phone] and brother Lorne Claire [phone]   Discharge Caregiver contacted prior to discharge? Will be contacted  Care Conference needed? no  Barriers to discharge: Medical clearance    Care management will remain accessible to provide assistance as the need for transition of care planning arises. The patient's care coordinator is Tata from the PACE program at [phone] to assist with care transitioning.    The  (CM) will reached out to the patient's brother, Lorne Claire, at [phone] . The  (IM) letter will be provided to the patient's brother , Lorne Claire, at [email or web address] once again close to discharge.     Prasanth Wilkins RN  Case Management  [phone]    
Transition of Care Plan:    RUR: 13% (Moderate RUR)  Prior Level of Functioning: Dependent with ADLS/IADLS   Disposition: Home with PACE program assistance and family support  Follow up appointments: [phone] Tata with PACE program will arrange   DME needed: Per PACE program  Transportation at discharge: PACE program will arrange transportation   IM/IMM Medicare/ letter given: 2nd IM needed upon discharge   Is patient a  and connected with VA? na   If yes, was [place] transfer form completed and VA notified? na  Caregiver Contact: The patient's sister in law, Orin Claire  [phone] and brother Lorne Claire [phone]   Discharge Caregiver contacted prior to discharge? Will be contacted  Care Conference needed? no  Barriers to discharge: Medical clearance    The  (CM) will reached out to the patient's brother, Lorne Claire, at [phone]  and Tata from the PACE program at [phone] once the patient is ready for discharge.  (IM) letter will be provided to the patient's brother , Lorne Claire, at [email or web address] once again close to discharge.     Prasanth Wilkins RN  Case Management  [phone]    
Transition of Care Plan:    RUR: 13% (Moderate RUR)  Prior Level of Functioning: Dependent with ADLS/IADLS   Disposition: Home with PACE program assistance and family support  Follow up appointments: [phone] Tata with PACE program will arrange   DME needed: Per PACE program  Transportation at discharge: PACE program will arrange transportation   IM/IMM Medicare/ letter given: 2nd IM needed upon discharge   Is patient a  and connected with VA? na   If yes, was [place] transfer form completed and VA notified? na  Caregiver Contact: The patient's sister in law, Orin Claire  [phone] and brother Lorne Claire [phone]   Discharge Caregiver contacted prior to discharge? Will be contacted  Care Conference needed? no  Barriers to discharge: Medical clearance    CM spoke the patient's representative, Placido Floyd [phone] with PACE program who would like for the patient to be placed in a facility, preferably, a partnered facility:     [facility] [phone]  [facility] [phone]  [facility] [phone]  [facility] [phone]    CM sent referrals via careLists of hospitals in the [place] and awaiting acceptance. Care management will remain accessible to provide assistance as the need for transition of care planning arises. The patient's care coordinator is Tata from the PACE program at [phone] to assist with care transitioning.    The  (CM) will reached out to the patient's brother, Lorne Claire, at [phone] . The  (IM) letter will be provided to the patient's brother , Lorne Claire, at [email or web address] once again close to discharge.     Prasanth Wilkins RN  Case Management  [phone]    
Transition of Care Plan:    RUR: 13% (Moderate RUR)  Prior Level of Functioning: Dependent with ADLS/IADLS   Disposition: Home with PACE program assistance and family support  Follow up appointments: [phone] Tata with PACE program will arrange   DME needed: Per PACE program  Transportation at discharge: PACE program will arrange transportation   IM/IMM Medicare/ letter given: 2nd IM needed upon discharge   Is patient a  and connected with VA? na   If yes, was [place] transfer form completed and VA notified? na  Caregiver Contact: The patient's sister in law, Orin Claire  [phone] and brother Lorne Claire [phone]   Discharge Caregiver contacted prior to discharge? Will be contacted  Care Conference needed? no  Barriers to discharge: Medical clearance    Tata from the PACE program at [phone] was contacted by NIK and was provided updates regarding the anticipated needs and discharge plan.  Care management will remain accessible to provide assistance as the need for transition of care planning arises.    The  (CM) will reached out to the patient's brother, Lorne Claire, at [phone] . The  (IM) letter will be provided to the patient's brother , Lorne Claire, at [email or web address] once again close to discharge.     Prasanth Wilkins RN  Case Management  [phone]    
Transition of Care Plan:    RUR: 13% (Moderate RUR)  Prior Level of Functioning: Dependent with ADLS/IADLS   Disposition: Home with PACE program assistance and family support  Follow up appointments: [phone] Tata with PACE program will arrange   DME needed: Per PACE program  Transportation at discharge: PACE program will arrange transportation   IM/IMM Medicare/ letter given: 2nd IM needed upon discharge   Is patient a  and connected with VA? na   If yes, was [place] transfer form completed and VA notified? na  Caregiver Contact: The patient's sister in law, Orin Claire  [phone] and brother Lorne Claire [phone]   Discharge Caregiver contacted prior to discharge? Will be contacted  Care Conference needed? no  Barriers to discharge: Medical clearance      The  (CM) attempted to contact the patient's representative, Lorne, multiple times without success. Subsequently, the CM reached out to Orin Claire, the patient's sister-in-law, at [phone]. Orin expressed her intention to consult with her  regarding the matter. Upon further communication, Orin called back and facilitated a conversation with her , who conveyed that there are no available caregivers to support the patient at home and expressed a preference for the patient to be placed in a facility. Coordination of care ensued.  Additionally, Tata Floyd, from the PACE program, was contacted for further assistance at [phone]    [facility] [phone] [facility] [place] [phone]  [facility] [phone]  [facility] [phone] [facility] ([facility]) Phone: [phone]      Care management will remain accessible to provide assistance as the need for transition of care planning arises. The 
Transition of Care Plan:    RUR: 13% (Moderate RUR)  Prior Level of Functioning: Dependent with ADLS/IADLS   Disposition: Home with PACE program assistance and family support  Follow up appointments: [phone] Tata with PACE program will arrange   DME needed: Per PACE program  Transportation at discharge: PACE program will arrange transportation   IM/IMM Medicare/ letter given: 2nd IM needed upon discharge   Is patient a  and connected with VA? na   If yes, was [place] transfer form completed and VA notified? na  Caregiver Contact: The patient's sister in law, Orin Claire  [phone] and brother Lorne Claire [phone]   Discharge Caregiver contacted prior to discharge? Will be contacted  Care Conference needed? no  Barriers to discharge: Medical clearance    Care management will remain accessible to provide assistance as the need for transition of care planning arises. The patient's care coordinator is Tata from the PACE program at [phone] to assist with care transitioning.    The  (CM) will reached out to the patient's brother, Lorne Claire, at [phone] . The  (IM) letter will be provided to the patient's brother , Lorne Claire, at [email or web address] once again close to discharge.     Prasanth Wilkins RN  Case Management  [phone]    
Transition of Care Plan:    RUR: 13% (Moderate RUR)  Prior Level of Functioning: Dependent with ADLS/IADLS   Disposition: Plan A: Long term care bed with PACE program   Plan B: Home with PACE program assistance and family support  If SNF or IPR: Date FOC offered: 04/23/2024  Date FOC received:  04/23/2024  Accepting facility: [facility] Phone: [phone] Address: [address] [place] [address]  Date authorization started with reference number: Deferred to Tata  Date authorization received and expires: Deferred to Tata   Follow up appointments: [phone] Tata with PACE program will arrange   DME needed: Per PACE program  Transportation at discharge: PACE program will arrange transportation   IM/[facility] Medicare/ letter given: 2nd IM emailed to [email or web address]  Is patient a [place] and connected with VA? na   If yes, was  transfer form completed and VA notified? na  Caregiver Contact: The patient's sister in law, Orin Claire  [phone] and brother Lorne Claire [phone]   Discharge Caregiver contacted prior to discharge? Will be contacted  Care Conference needed? no  Barriers to discharge: Placement      The  (CM) called Tata Dominguezunt, from the PACE program, [phone] to receive updates about the patient's placement. However, she stated that [facility] Phone: (or [facility] and [facility] (are no longer available at this time. However, she is working on getting a single contract with [facility] Phone: [phone] Address: [address] [place] [address] and expect for the bed to be secured on Friday the 26th. Furthermore, Tata will arrange transportation upon discharge. NIK emailed the 2nd IM to patient's brother , Lorne Claire, at [email or web address]      Care management will remain accessible to provide assistance as the need 
Transition of Care Plan:    RUR: 13% (Moderate RUR)  Prior Level of Functioning: Dependent with ADLS/IADLS   Disposition: Plan A: Long term care bed with [facility]   Plan B: Home with [facility] assistance and family support  If SNF or IPR: Date FOC offered: 04/23/2024  Date FOC received:  04/23/2024  Accepting facility: Pending  Date authorization started with reference number:   Date authorization received and expires:   Follow up appointments: [phone] Tata with [facility] will arrange   DME needed: Per [facility]  Transportation at discharge: [facility] will arrange transportation   IM/IMM Medicare/ letter given: 2nd IM needed upon discharge   Is patient a [place] and connected with [facility]? na   If yes, was  transfer form completed and [facility] notified? na  Caregiver Contact: The patient's sister in law, Orin Claire  [phone] and brother Lorne Claire [phone]   Discharge Caregiver contacted prior to discharge? Will be contacted  Care Conference needed? no  Barriers to discharge: Medical clearance      The  (NIK) collaborated with Tata Floyd, from the [facility], [phone]  Regarding placing the patient in a long term care bed with [facility]. After the patient has been denied from several facilities, Tata is working on obtaining a contract with either [facility] Phone: [phone] or [facility] ([facility]) Phone: [phone]. Tata stated she will call CM once she secures a bed and will set up transportation.     Care management will remain accessible to provide assistance as the need for transition of care planning arises. The patient's care coordinator is Tata from the [facility] program at [phone] to assist with care transitioning.    The  (CM) will reached out to the patient's brother, Lorne Claire, at [phone] The  (IM) 
notified? na  Caregiver Contact: The patient's sister in law, Orin Claire  [phone] and brother Lorne Claire [phone]   Discharge Caregiver contacted prior to discharge?yes  Care Conference needed? no  Barriers to discharge: Placement    Joanna Weaver, NIK  Ext [phone]

## 2024-05-08 NOTE — ADT AUTH CERT
CONSULTS/ASSESSMENTS & PLANS:  Assessment / Plan:  Severe sepsis most likely pneumonia  Acute septic encephalopathy  Abnormal CT of the chest: ?Lung mass      Will admit to stepdown unit, initial lactic acidosis was 3.29, trended down to within normal limit.  Status post IV fluid  Continue with Zosyn and vancomycin  Patient was awake alert but confused  SLP eval, for now allow dysphagia soft and bite-size diet  Will consult pulmonology     Hyponatremia, most likely due to dehydration  Sodium 124, continue with normal saline, BMP Q8  Nephro consulted.  Avoid rapid correction     Hypertension  Patient is on metoprolol, BP was soft on admission, improved  Resume metoprolol low-dose with holding parameters     Hyperlipidemia  Continue with Lipitor     Bipolar disease  Continue with Depakote, Remeron, Nuedexta, Valium                    Nephrology Consult Note:     IMPRESSION & PLAN:   Hyponatremia(suspect secondary to IVVD, poor p.o. intake)  Sepsis  Pneumonia  Right lower lobe lung mass/pneumonia  Altered mental state  Hypotension     PLAN-  -agree with IV normal saline at 75 mill per hour.  -Status post 2.5 L fluid boluses.  -Check BMP every 6 hours.  -Ordered urine sodium, serum uric acid.  -Encourage p.o. intake.  -IV antibiotics per primary team.  -Thank you for the consult.         Subjective:   HPI: Lillie Claire is a 68 y.o. female who has past medical history significant for bipolar disorder, CVA, hypertension who presents to the ED for altered mental state.  Most history is obtained from medical records as patient is a very poor historian.  Patient has been confused for a week and has been having recurrent falls and tremors.  Initial workup in ED showed her to have hyponatremia of 124 with elevated lactate of 3.29.  She had a CT chest/abdomen pelvis done that showed 5*4 cm partial consolidation of superior segment of right lower lobe.  She also has  right paratracheal and right hilar lymphadenopathy.

## 2024-05-09 PROBLEM — B18.2 CHRONIC HEPATITIS C WITHOUT HEPATIC COMA (HCC): Chronic | Status: ACTIVE | Noted: 2024-04-15

## 2024-05-09 PROBLEM — F31.9 BIPOLAR DISORDER (HCC): Chronic | Status: ACTIVE | Noted: 2024-04-15

## 2024-05-09 NOTE — PROGRESS NOTES
Substance Use Topics    Alcohol use: Yes    Drug use: Not Currently       Current Medications:  Current Outpatient Medications on File Prior to Visit   Medication Sig Dispense Refill    diazePAM (VALIUM) 2 MG tablet Take 0.5 tablets by mouth nightly for 3 days. Max Daily Amount: 1 mg 2 tablet 0    amoxicillin-clavulanate (AUGMENTIN) 875-125 MG per tablet Take 1 tablet by mouth 2 times daily for 7 days 14 tablet 0    furosemide (LASIX) 20 MG tablet Take 1 tablet by mouth in the morning and 1 tablet in the evening. 60 tablet 3    potassium chloride (K-TAB) 20 MEQ TBCR extended release tablet Take 2 tablets by mouth daily 60 tablet 0    pantoprazole (PROTONIX) 40 MG tablet Take 1 tablet by mouth 2 times daily (before meals) 30 tablet 0    sucralfate (CARAFATE) 1 GM tablet Take 1 tablet by mouth 4 times daily (before meals and nightly) 120 tablet 0    atorvastatin (LIPITOR) 20 MG tablet Take 1 tablet by mouth daily      senna-docusate (PERICOLACE) 8.6-50 MG per tablet Take 1 tablet by mouth daily      metoprolol tartrate (LOPRESSOR) 25 MG tablet Take 1 tablet by mouth 2 times daily      dextromethorphan-quiNIDine (NUEDEXTA) 20-10 MG CAPS per capsule Take 1 capsule by mouth in the morning and at bedtime      divalproex (DEPAKOTE) 250 MG DR tablet Take 1 tablet by mouth in the morning and at bedtime      mirtazapine (REMERON) 15 MG tablet Take 1 tablet by mouth nightly       No current facility-administered medications on file prior to visit.       The patient's problem list, medication list, allergies, past medical history, family history and social history have been reviewed and updated during today's visit.      OBJECTIVE:    Vital Signs: /70   Pulse 80   Resp 24       General: Well developed, well nourished, ill-appearing female in no acute distress  Skin:  No rashes or suspicious skin lesion noted  HEENT: Normocephalic, PERRL, ear are normal, tongue is normal, oropharynx shows no

## 2024-05-10 VITALS — RESPIRATION RATE: 24 BRPM | DIASTOLIC BLOOD PRESSURE: 70 MMHG | SYSTOLIC BLOOD PRESSURE: 120 MMHG | HEART RATE: 80 BPM

## 2024-05-11 LAB
BACTERIA SPEC CULT: NORMAL
SERVICE CMNT-IMP: NORMAL

## (undated) DEVICE — CAPSULE ENDOSCP L26.2MM DIA11.4MM BIOCOMPATIBLE PLAS SB 3

## (undated) DEVICE — CATHETER IV 20GA L1.16IN OD1.0414-1.1176MM ID0.762-0.8382MM

## (undated) DEVICE — SET GRAV CK VLV NEEDLESS ST 3 GANGED 4WAY STPCOCK HI FLO 10

## (undated) DEVICE — IV START KIT: Brand: MEDLINE

## (undated) DEVICE — CUFF BLD PRSS AD CLTH SGL TB W/ BAYNT CONN ROUNDED CORNER

## (undated) DEVICE — CATHETER IV 22GA L1IN OD0.8382-0.9144MM ID0.6096-0.6858MM 382523

## (undated) DEVICE — SYSTEM REPROC CBL 3 LD DISPOSABLE

## (undated) DEVICE — SB 3 CAPSULE, 5-PACK: Brand: PILLCAM

## (undated) DEVICE — Device

## (undated) DEVICE — CATHETER IV 24GA L0.75IN OD0.6604-0.7366MM

## (undated) DEVICE — CATHETER IV 18GA L1.16IN OD1.27-1.3462MM ID0.9398-1.016MM

## (undated) DEVICE — CAPSULE VID ENDOSCP SM BWL DIR VIS PILLCAM

## (undated) DEVICE — KIT,1200CC CANISTER,3/16"X6' TUBING: Brand: MEDLINE INDUSTRIES, INC.

## (undated) DEVICE — ELECTRODE PT RET AD L9FT HI MOIST COND ADH HYDRGEL CORDED